# Patient Record
Sex: MALE | Race: WHITE | NOT HISPANIC OR LATINO | Employment: FULL TIME | URBAN - METROPOLITAN AREA
[De-identification: names, ages, dates, MRNs, and addresses within clinical notes are randomized per-mention and may not be internally consistent; named-entity substitution may affect disease eponyms.]

---

## 2017-03-20 ENCOUNTER — PATIENT MESSAGE (OUTPATIENT)
Dept: MEDICAL GROUP | Facility: MEDICAL CENTER | Age: 42
End: 2017-03-20

## 2017-03-20 DIAGNOSIS — Z87.19 HISTORY OF HEMORRHOIDS: ICD-10-CM

## 2017-03-20 DIAGNOSIS — K92.1 BLOOD IN STOOL: ICD-10-CM

## 2017-03-21 NOTE — TELEPHONE ENCOUNTER
From: Fredis Perry  To: EVELYN Ruvalcaba  Sent: 3/20/2017 10:30 PM PDT  Subject: Non-Urgent Medical Question    Hello,   I was wondering if I could get a referral for a gastrointestinal doctor. I'm having (more than usual) bleeding during bowel movements. Years ago one of my doctors told me that I just had hemorrhoids, but I think that I would like to get it looked at.   I wasn't sure if I could just ask on here, or if I needed to make an appointment with you first in order to get a referral.     Thanks

## 2017-09-28 RX ORDER — NADOLOL 20 MG/1
20 TABLET ORAL DAILY
Qty: 30 TAB | Refills: 0 | Status: SHIPPED | OUTPATIENT
Start: 2017-09-28 | End: 2017-10-25 | Stop reason: SDUPTHER

## 2017-09-28 NOTE — TELEPHONE ENCOUNTER
Was the patient seen in the last year in this department? Yes     Does patient have an active prescription for medications requested? No     Received Request Via: Patient through [email or web address]

## 2017-10-26 RX ORDER — NADOLOL 20 MG/1
TABLET ORAL
Qty: 30 TAB | Refills: 1 | Status: SHIPPED | OUTPATIENT
Start: 2017-10-26 | End: 2017-11-08 | Stop reason: SDUPTHER

## 2017-10-26 NOTE — TELEPHONE ENCOUNTER
Was the patient seen in the last year in this department? Yes     Does patient have an active prescription for medications requested? Yes     Received Request Via: Pharmacy     Last office visit 11/7/16

## 2017-11-08 ENCOUNTER — OFFICE VISIT (OUTPATIENT)
Dept: MEDICAL GROUP | Facility: MEDICAL CENTER | Age: 42
End: 2017-11-08
Payer: COMMERCIAL

## 2017-11-08 VITALS
WEIGHT: 218 LBS | SYSTOLIC BLOOD PRESSURE: 110 MMHG | BODY MASS INDEX: 31.21 KG/M2 | OXYGEN SATURATION: 98 % | DIASTOLIC BLOOD PRESSURE: 72 MMHG | HEIGHT: 70 IN | TEMPERATURE: 97.6 F | HEART RATE: 78 BPM

## 2017-11-08 DIAGNOSIS — F32.A ANXIETY AND DEPRESSION: ICD-10-CM

## 2017-11-08 DIAGNOSIS — E66.9 OBESITY (BMI 30-39.9): ICD-10-CM

## 2017-11-08 DIAGNOSIS — R73.01 ELEVATED FASTING GLUCOSE: ICD-10-CM

## 2017-11-08 DIAGNOSIS — Z00.00 ANNUAL PHYSICAL EXAM: ICD-10-CM

## 2017-11-08 DIAGNOSIS — E78.1 HIGH TRIGLYCERIDES: ICD-10-CM

## 2017-11-08 DIAGNOSIS — Z13.29 THYROID DISORDER SCREEN: ICD-10-CM

## 2017-11-08 DIAGNOSIS — K21.9 GASTROESOPHAGEAL REFLUX DISEASE WITHOUT ESOPHAGITIS: ICD-10-CM

## 2017-11-08 DIAGNOSIS — R74.8 ELEVATED LIVER ENZYMES: ICD-10-CM

## 2017-11-08 DIAGNOSIS — F41.9 ANXIETY AND DEPRESSION: ICD-10-CM

## 2017-11-08 DIAGNOSIS — Z12.5 SCREENING PSA (PROSTATE SPECIFIC ANTIGEN): ICD-10-CM

## 2017-11-08 PROCEDURE — 99396 PREV VISIT EST AGE 40-64: CPT | Performed by: NURSE PRACTITIONER

## 2017-11-08 RX ORDER — NADOLOL 20 MG/1
20 TABLET ORAL DAILY
Qty: 30 TAB | Refills: 1 | Status: SHIPPED | OUTPATIENT
Start: 2017-11-08 | End: 2018-02-27 | Stop reason: SDUPTHER

## 2017-11-08 ASSESSMENT — PATIENT HEALTH QUESTIONNAIRE - PHQ9: CLINICAL INTERPRETATION OF PHQ2 SCORE: 0

## 2017-11-08 NOTE — ASSESSMENT & PLAN NOTE
Intermittent issue over the last several years which he states has been further evaluated with ultrasound at one point. He states that this had been normal at the time, unsure if there was some indication of fatty liver  He does not drink any alcohol, rarely uses over-the-counter medications like Tylenol

## 2017-11-08 NOTE — ASSESSMENT & PLAN NOTE
Last lipid panel with trig > 400  States he is generally eating healthy, high vegetable diet. Does not drink any alcohol, has an occasional soda but not many sweets otherwise

## 2017-11-08 NOTE — PROGRESS NOTES
Chief Complaint   Patient presents with   • Medication Refill     Fredis Perry is a 42 y.o. male Established patient here for annual exam, he has no acute concerns. He is , has 3 children ages 12-21 who are still living with him. He is working for a home [employer] company. We discussed:    GERD (gastroesophageal reflux disease)  Well controlled with omeprazole    Anxiety and depression  Stable with lexapro 20 mg daily and nadolol 20 mg daily    High triglycerides  Last lipid panel with trig > 400  States he is generally eating healthy, high vegetable diet. Does not drink any alcohol, has an occasional soda but not many sweets otherwise    Elevated liver enzymes  Intermittent issue over the last several years which he states has been further evaluated with ultrasound at one point. He states that this had been normal at the time, unsure if there was some indication of fatty liver  He does not drink any alcohol, rarely uses over-the-counter medications like Tylenol    Elevated fasting glucose  Last fasting glucose 103  Has gained some slight weight since that time no polyuria, polydipsia      Obesity (BMI 30-39.9)  Working out about every other day- elliptical, treadmill, walking dogs    Current medicines (including changes today)  Current Outpatient Prescriptions   Medication Sig Dispense Refill   • nadolol (CORGARD) 20 MG Tab Take 1 Tab by mouth every day. TAKE ONE TABLET BY MOUTH DAILY 30 Tab 1   • omeprazole (PRILOSEC) 20 MG delayed-release capsule Take 1 Cap by mouth every day. 30 Cap 0   • escitalopram (LEXAPRO) 10 MG TABS Take 20 mg by mouth every day.       No current facility-administered medications for this visit.      He  has a past medical history of Anxiety; Depression; GERD (gastroesophageal reflux disease); and Hyperlipidemia.  He  has a past surgical history that includes tonsillectomy.  Social History   Substance Use Topics   • Smoking status: Never Smoker   • Smokeless  "tobacco: Never Used   • Alcohol use No     Social History     Social History Narrative   • No narrative on file     Family History   Problem Relation Age of Onset   • Psychiatry Mother    • Heart Disease Father    • Hypertension Father    • Hyperlipidemia Father    • Alcohol/Drug Father    • Diabetes Maternal Grandmother    • Heart Disease Maternal Grandfather    • Stroke Maternal Grandfather      Family Status   Relation Status   • Mother Alive   • Father Alive   • Sister Alive   • Brother Alive   • Maternal Grandmother    • Maternal Grandfather Alive   • Daughter Alive   • Daughter Alive   • Son Alive         Review of Systems   Constitutional: Negative for fever, chills and malaise/fatigue.   HENT: Negative for congestion, ear pain and sore throat.   Cardiovascular: Negative for chest pain, swelling in lower extremities  Pulmonary: Negative for chronic cough or shortness of breath   Gastrointestinal: Negative for nausea, vomiting and diarrhea.   Genitourinary: Negative for dysuria, urgency, frequency, hematuria and flank pain.   Neurological: Negative for dizziness and headaches.   Skin: Negative for rash, bruising     Objective:     Blood pressure 110/72, pulse 78, temperature 36.4 °C (97.6 °F), height 1.778 m (5' 10\"), weight 98.9 kg (218 lb), SpO2 98 %. Body mass index is 31.28 kg/m².  Physical Exam:  General: Alert, oriented in no acute distress.  Eye contact is good, speech is normal, affect calm  HEENT: EOMI, perrl, Oral mucosa pink moist, no lesions. Nares patent. TMs gray with good landmarks bilaterally. No cervical or supraclavicular lymphadenopathy  Lungs: clear to auscultation bilaterally, good aeration, normal effort. No wheeze/ rhonchi/ rales.  CV: regular rate and rhythm, S1, S2. No murmur, no JVD, no edema. Pedal pulses 2 + bilaterally  Abdomen: soft, nontender, BS x4, no hepatosplenomegaly.  Ext: color normal, vascularity normal, temperature normal. No rash or lesions.  MS:  No joint " swelling or redness. Strength is 5/5 globally  Neuro: DTR 2+ bilaterally  Assessment and Plan:   The following treatment plan was discussed   1. Annual physical exam  Normal exam. Impression topics reviewed including healthy diet, regular exercise, weight loss recommended. 2000 international units vitamin D3 advise daily    2. Gastroesophageal reflux disease without esophagitis  Stable with omeprazole    3. Anxiety and depression    Doing well on current medication nadolol (CORGARD) 20 MG Tab   4. High triglycerides  Triglycerides over 400 last year. Reevaluate, discussed that he may need treatment  LIPID PROFILE   5. Elevated fasting glucose  Mild glucose elevation last 2 years  HEMOGLOBIN A1C    COMP METABOLIC PANEL   6. Elevated liver enzymes  Intermittent issue over the last few years which apparently has been evaluated with ultrasound in the past, his unsure of the exact finding. States it also had hepatitis testing in the past. He does not drink any alcohol and rarely uses Tylenol. Reevaluate    7. Thyroid disorder screen  TSH WITH REFLEX TO FT4   8. Screening PSA (prostate specific antigen)  PROSTATE SPECIFIC AG SCREENING   9. Obesity (BMI 30-39.9)  Patient identified as having weight management issue.  Appropriate orders and counseling given.       Educated in proper administration of medication(s) ordered today including safety, possible SE, risks, benefits, rationale and alternatives to therapy.       Followup: pending labs             Please note that this dictation was created using voice recognition software. I have worked with consultants from the vendor as well as technical experts from [employer] to optimize the interface. I have made every reasonable attempt to correct obvious errors, but I expect that there are errors of grammar and possibly content that I did not discover before finalizing the note.

## 2017-11-29 ENCOUNTER — HOSPITAL ENCOUNTER (OUTPATIENT)
Dept: LAB | Facility: MEDICAL CENTER | Age: 42
End: 2017-11-29
Attending: NURSE PRACTITIONER
Payer: COMMERCIAL

## 2017-11-29 DIAGNOSIS — E78.1 HIGH TRIGLYCERIDES: ICD-10-CM

## 2017-11-29 DIAGNOSIS — Z13.29 THYROID DISORDER SCREEN: ICD-10-CM

## 2017-11-29 DIAGNOSIS — Z12.5 SCREENING PSA (PROSTATE SPECIFIC ANTIGEN): ICD-10-CM

## 2017-11-29 DIAGNOSIS — R73.01 ELEVATED FASTING GLUCOSE: ICD-10-CM

## 2017-11-29 LAB
ALBUMIN SERPL BCP-MCNC: 4.2 G/DL (ref 3.2–4.9)
ALBUMIN/GLOB SERPL: 1.5 G/DL
ALP SERPL-CCNC: 80 U/L (ref 30–99)
ALT SERPL-CCNC: 56 U/L (ref 2–50)
ANION GAP SERPL CALC-SCNC: 7 MMOL/L (ref 0–11.9)
AST SERPL-CCNC: 33 U/L (ref 12–45)
BILIRUB SERPL-MCNC: 0.6 MG/DL (ref 0.1–1.5)
BUN SERPL-MCNC: 12 MG/DL (ref 8–22)
CALCIUM SERPL-MCNC: 9.7 MG/DL (ref 8.5–10.5)
CHLORIDE SERPL-SCNC: 105 MMOL/L (ref 96–112)
CHOLEST SERPL-MCNC: 218 MG/DL (ref 100–199)
CO2 SERPL-SCNC: 29 MMOL/L (ref 20–33)
CREAT SERPL-MCNC: 1.08 MG/DL (ref 0.5–1.4)
GFR SERPL CREATININE-BSD FRML MDRD: >60 ML/MIN/1.73 M 2
GLOBULIN SER CALC-MCNC: 2.8 G/DL (ref 1.9–3.5)
GLUCOSE SERPL-MCNC: 105 MG/DL (ref 65–99)
HDLC SERPL-MCNC: 33 MG/DL
LDLC SERPL CALC-MCNC: ABNORMAL MG/DL
POTASSIUM SERPL-SCNC: 3.9 MMOL/L (ref 3.6–5.5)
PROT SERPL-MCNC: 7 G/DL (ref 6–8.2)
PSA SERPL-MCNC: 1.12 NG/ML (ref 0–4)
SODIUM SERPL-SCNC: 141 MMOL/L (ref 135–145)
TRIGL SERPL-MCNC: 408 MG/DL (ref 0–149)
TSH SERPL DL<=0.005 MIU/L-ACNC: 2.42 UIU/ML (ref 0.3–3.7)

## 2017-11-29 PROCEDURE — 84153 ASSAY OF PSA TOTAL: CPT

## 2017-11-29 PROCEDURE — 83036 HEMOGLOBIN GLYCOSYLATED A1C: CPT

## 2017-11-29 PROCEDURE — 80061 LIPID PANEL: CPT

## 2017-11-29 PROCEDURE — 80053 COMPREHEN METABOLIC PANEL: CPT

## 2017-11-29 PROCEDURE — 84443 ASSAY THYROID STIM HORMONE: CPT

## 2017-11-29 PROCEDURE — 36415 COLL VENOUS BLD VENIPUNCTURE: CPT

## 2017-11-30 LAB
EST. AVERAGE GLUCOSE BLD GHB EST-MCNC: 131 MG/DL
HBA1C MFR BLD: 6.2 % (ref 0–5.6)

## 2017-12-01 DIAGNOSIS — E78.1 HIGH TRIGLYCERIDES: ICD-10-CM

## 2017-12-01 DIAGNOSIS — R73.09 ELEVATED HEMOGLOBIN A1C: ICD-10-CM

## 2017-12-01 RX ORDER — FENOFIBRATE 145 MG/1
145 TABLET, COATED ORAL DAILY
Qty: 30 TAB | Refills: 5 | Status: SHIPPED | OUTPATIENT
Start: 2017-12-01 | End: 2018-05-27 | Stop reason: SDUPTHER

## 2018-02-27 DIAGNOSIS — F32.A ANXIETY AND DEPRESSION: ICD-10-CM

## 2018-02-27 DIAGNOSIS — F41.9 ANXIETY AND DEPRESSION: ICD-10-CM

## 2018-02-27 RX ORDER — NADOLOL 20 MG/1
20 TABLET ORAL DAILY
Qty: 30 TAB | Refills: 5 | Status: SHIPPED | OUTPATIENT
Start: 2018-02-27 | End: 2018-08-29 | Stop reason: SDUPTHER

## 2018-05-27 DIAGNOSIS — E78.1 HIGH TRIGLYCERIDES: ICD-10-CM

## 2018-05-29 RX ORDER — FENOFIBRATE 145 MG/1
TABLET, COATED ORAL
Qty: 30 TAB | Refills: 0 | Status: SHIPPED | OUTPATIENT
Start: 2018-05-29 | End: 2018-06-27 | Stop reason: SDUPTHER

## 2018-06-11 ENCOUNTER — HOSPITAL ENCOUNTER (OUTPATIENT)
Dept: LAB | Facility: MEDICAL CENTER | Age: 43
End: 2018-06-11
Attending: NURSE PRACTITIONER
Payer: COMMERCIAL

## 2018-06-11 DIAGNOSIS — E78.1 HIGH TRIGLYCERIDES: ICD-10-CM

## 2018-06-11 DIAGNOSIS — R73.09 ELEVATED HEMOGLOBIN A1C: ICD-10-CM

## 2018-06-11 LAB
ALBUMIN SERPL BCP-MCNC: 4.4 G/DL (ref 3.2–4.9)
ALBUMIN/GLOB SERPL: 1.8 G/DL
ALP SERPL-CCNC: 51 U/L (ref 30–99)
ALT SERPL-CCNC: 37 U/L (ref 2–50)
ANION GAP SERPL CALC-SCNC: 9 MMOL/L (ref 0–11.9)
AST SERPL-CCNC: 25 U/L (ref 12–45)
BILIRUB SERPL-MCNC: 0.4 MG/DL (ref 0.1–1.5)
BUN SERPL-MCNC: 21 MG/DL (ref 8–22)
CALCIUM SERPL-MCNC: 9.4 MG/DL (ref 8.5–10.5)
CHLORIDE SERPL-SCNC: 106 MMOL/L (ref 96–112)
CHOLEST SERPL-MCNC: 184 MG/DL (ref 100–199)
CO2 SERPL-SCNC: 25 MMOL/L (ref 20–33)
CREAT SERPL-MCNC: 1.25 MG/DL (ref 0.5–1.4)
EST. AVERAGE GLUCOSE BLD GHB EST-MCNC: 123 MG/DL
GLOBULIN SER CALC-MCNC: 2.4 G/DL (ref 1.9–3.5)
GLUCOSE SERPL-MCNC: 112 MG/DL (ref 65–99)
HBA1C MFR BLD: 5.9 % (ref 0–5.6)
HDLC SERPL-MCNC: 31 MG/DL
LDLC SERPL CALC-MCNC: 120 MG/DL
POTASSIUM SERPL-SCNC: 4.2 MMOL/L (ref 3.6–5.5)
PROT SERPL-MCNC: 6.8 G/DL (ref 6–8.2)
SODIUM SERPL-SCNC: 140 MMOL/L (ref 135–145)
TRIGL SERPL-MCNC: 167 MG/DL (ref 0–149)

## 2018-06-11 PROCEDURE — 80053 COMPREHEN METABOLIC PANEL: CPT

## 2018-06-11 PROCEDURE — 83036 HEMOGLOBIN GLYCOSYLATED A1C: CPT

## 2018-06-11 PROCEDURE — 36415 COLL VENOUS BLD VENIPUNCTURE: CPT

## 2018-06-11 PROCEDURE — 80061 LIPID PANEL: CPT

## 2018-06-19 ENCOUNTER — TELEPHONE (OUTPATIENT)
Dept: MEDICAL GROUP | Facility: MEDICAL CENTER | Age: 43
End: 2018-06-19

## 2018-06-19 NOTE — TELEPHONE ENCOUNTER
1. Caller Name: Fredis                                         Call Back Number: Mychart      Patient approves a detailed voicemail message: N\A    2. SPECIFIC Action To Be Taken: Orders pending, please sign.    3. Diagnosis/Clinical Reason for Request: Vasectomy (patient stated that he had a vasectomy and would like to make sure it is still functioning as it should.     4. Specialty & Provider Name/Lab/Imaging Location: Urology     5. Is appointment scheduled for requested order/referral: no    Patient was informed they will receive a return phone call from the office ONLY if there are any questions before processing their request. Advised to call back if they haven't received a call from the referral department in 5 days.

## 2018-07-16 ENCOUNTER — OFFICE VISIT (OUTPATIENT)
Dept: MEDICAL GROUP | Facility: MEDICAL CENTER | Age: 43
End: 2018-07-16
Payer: COMMERCIAL

## 2018-07-16 VITALS
RESPIRATION RATE: 16 BRPM | HEIGHT: 70 IN | WEIGHT: 220 LBS | TEMPERATURE: 97.8 F | SYSTOLIC BLOOD PRESSURE: 102 MMHG | DIASTOLIC BLOOD PRESSURE: 80 MMHG | OXYGEN SATURATION: 100 % | HEART RATE: 65 BPM | BODY MASS INDEX: 31.5 KG/M2

## 2018-07-16 DIAGNOSIS — Z98.52 HISTORY OF VASECTOMY: ICD-10-CM

## 2018-07-16 PROCEDURE — 99213 OFFICE O/P EST LOW 20 MIN: CPT | Performed by: NURSE PRACTITIONER

## 2018-07-16 NOTE — PROGRESS NOTES
"Subjective:     Chief Complaint   Patient presents with   • Referral Needed     UROLOGIST     Fredis Claudio Orlando is a 42 y.o. male here today to discuss concerns with vasectomy. Reports having [place]. Wife is now pregnant. He does not recall having gone to his follow-up appointment after the procedure for having a sperm analysis of time. Denies any concerns of infidelity. He would like to have sperm analysis now and possibly repeat vasectomy if needed    Current medicines (including changes today)  Current Outpatient Prescriptions   Medication Sig Dispense Refill   • fenofibrate (TRICOR) 145 MG Tab TAKE ONE TABLET BY MOUTH DAILY 30 Tab 5   • nadolol (CORGARD) 20 MG Tab Take 1 Tab by mouth every day. 30 Tab 5   • omeprazole (PRILOSEC) 20 MG delayed-release capsule Take 1 Cap by mouth every day. 30 Cap 0   • escitalopram (LEXAPRO) 10 MG TABS Take 20 mg by mouth every day.       No current facility-administered medications for this visit.      He  has a past medical history of Anxiety; Depression; GERD (gastroesophageal reflux disease); and Hyperlipidemia.    ROS included above     Objective:     Blood pressure 102/80, pulse 65, temperature 36.6 °C (97.8 °F), resp. rate 16, height 1.778 m (5' 10\"), weight 99.8 kg (220 lb), SpO2 100 %. Body mass index is 31.57 kg/m².     Physical Exam:  General: Alert, oriented in no acute distress.  Eye contact is good, speech is normal, affect calm  Lungs: clear to auscultation bilaterally, normal effort, no wheeze/ rhonchi/ rales.  CV: regular rate and rhythm, S1, S2, no murmur  Abdomen: soft, nontender  Ext: no edema, color normal, vascularity normal, temperature normal    Assessment and Plan:   The following treatment plan was discussed   1. History of vasectomy  Vasectomy 12 years ago, wife now pregnant. Sperm analysis ordered, will refer to urology for repeat vasectomy if indicated  SPERM COUNT, TOTAL       Followup: pending labs     "     Please note that this dictation was created using voice recognition software. I have worked with consultants from the vendor as well as technical experts from [facility] to optimize the interface. I have made every reasonable attempt to correct obvious errors, but I expect that there are errors of grammar and possibly content that I did not discover before finalizing the note.

## 2018-07-24 RX ORDER — ESCITALOPRAM OXALATE 10 MG/1
20 TABLET ORAL DAILY
Qty: 180 TAB | Refills: 3 | Status: SHIPPED | OUTPATIENT
Start: 2018-07-24 | End: 2019-07-21 | Stop reason: SDUPTHER

## 2018-12-22 DIAGNOSIS — E78.1 HIGH TRIGLYCERIDES: ICD-10-CM

## 2018-12-25 RX ORDER — FENOFIBRATE 145 MG/1
TABLET, COATED ORAL
Qty: 90 TAB | Refills: 1 | Status: SHIPPED | OUTPATIENT
Start: 2018-12-25 | End: 2019-06-23 | Stop reason: SDUPTHER

## 2019-01-09 ENCOUNTER — TELEMEDICINE2 (OUTPATIENT)
Dept: URGENT CARE | Facility: PHYSICIAN GROUP | Age: 44
End: 2019-01-09
Payer: COMMERCIAL

## 2019-01-10 ENCOUNTER — OFFICE VISIT (OUTPATIENT)
Dept: URGENT CARE | Facility: MEDICAL CENTER | Age: 44
End: 2019-01-10
Payer: COMMERCIAL

## 2019-01-10 VITALS
SYSTOLIC BLOOD PRESSURE: 110 MMHG | BODY MASS INDEX: 32.5 KG/M2 | TEMPERATURE: 98.9 F | WEIGHT: 227 LBS | OXYGEN SATURATION: 99 % | HEART RATE: 61 BPM | HEIGHT: 70 IN | DIASTOLIC BLOOD PRESSURE: 80 MMHG

## 2019-01-10 DIAGNOSIS — K04.7 DENTAL INFECTION: ICD-10-CM

## 2019-01-10 PROCEDURE — 99203 OFFICE O/P NEW LOW 30 MIN: CPT | Performed by: NURSE PRACTITIONER

## 2019-01-10 RX ORDER — AMOXICILLIN 500 MG/1
500 CAPSULE ORAL 3 TIMES DAILY
Qty: 30 CAP | Refills: 0 | Status: SHIPPED | OUTPATIENT
Start: 2019-01-10 | End: 2019-01-20

## 2019-01-10 RX ORDER — CHLORHEXIDINE GLUCONATE ORAL RINSE 1.2 MG/ML
15 SOLUTION DENTAL 2 TIMES DAILY
Qty: 1 BOTTLE | Refills: 0 | Status: SHIPPED | OUTPATIENT
Start: 2019-01-10 | End: 2019-09-05

## 2019-01-10 ASSESSMENT — ENCOUNTER SYMPTOMS
NAUSEA: 0
BACK PAIN: 0
PALPITATIONS: 0
NECK PAIN: 0
DIZZINESS: 0
TINGLING: 0
CHILLS: 0
FEVER: 0
COUGH: 0

## 2019-01-10 ASSESSMENT — LIFESTYLE VARIABLES: SUBSTANCE_ABUSE: 0

## 2019-01-11 NOTE — PROGRESS NOTES
"Subjective:      Fredis Perry is a 43 y.o. male who presents with Dental Injury (tooth/mouth poss infection X2 days)    Denies past medical, surgical or family history that is significant to today's problem.   RX or OTC medications-- reviewed with patient today.   No Known Allergies          HPI this is a new problem.  Fredis is a 43-year-old male patient who complains of dental infection.  He has an appointment with his dentist to follow-up next week but was asked that he go to his doctor and get some antibiotics prior to his appointment.  He has facial swelling and tenderness that extends up to the bridge of his nose and under his eye.  He has been trying dental flossing, mouth washing and brushing twice a day.  He has neglected his dental care due to lack of insurance.  He has multiple fractured teeth but is just not have the resources to fix them.  Tylenol has been helping his pain and discomfort.  Pain 4 out of 10, constant.  Pain woke him up a couple times last night but he was quickly able to return to sleep.  No other aggravating or alleviating factors.    Review of Systems   Constitutional: Negative for chills, fever and malaise/fatigue.   Respiratory: Negative for cough.    Cardiovascular: Negative for chest pain and palpitations.   Gastrointestinal: Negative for nausea.   Musculoskeletal: Negative for back pain and neck pain.   Neurological: Negative for dizziness and tingling.   Psychiatric/Behavioral: Negative for substance abuse.          Objective:     /80   Pulse 61   Temp 37.2 °C (98.9 °F) (Temporal)   Ht 1.778 m (5' 10\")   Wt 103 kg (227 lb)   SpO2 99%   BMI 32.57 kg/m²      Physical Exam   Constitutional: He is oriented to person, place, and time. Vital signs are normal. He appears well-developed and well-nourished. He is cooperative.  Non-toxic appearance. He does not appear ill. He appears distressed.   HENT:   Head: Normocephalic.       Right Ear: Hearing, tympanic membrane, " external ear and ear canal normal.   Left Ear: Hearing, tympanic membrane, external ear and ear canal normal.   Nose: Right sinus exhibits no maxillary sinus tenderness and no frontal sinus tenderness. Left sinus exhibits no maxillary sinus tenderness and no frontal sinus tenderness.   Mouth/Throat: Uvula is midline, oropharynx is clear and moist and mucous membranes are normal. Abnormal dentition. Dental caries present. No dental abscesses or uvula swelling. No oropharyngeal exudate or tonsillar abscesses.       Eyes: Pupils are equal, round, and reactive to light. Conjunctivae, EOM and lids are normal.   Neck: Trachea normal, normal range of motion, full passive range of motion without pain and phonation normal. Neck supple.   Cardiovascular: Normal rate, regular rhythm, normal heart sounds and normal pulses.    No murmur heard.  Pulmonary/Chest: Effort normal and breath sounds normal. No respiratory distress. He has no decreased breath sounds. He has no wheezes. He has no rhonchi. He has no rales.   Abdominal: Soft. Normal appearance and bowel sounds are normal.   Musculoskeletal: Normal range of motion.   Lymphadenopathy:        Head (right side): No tonsillar, no preauricular and no posterior auricular adenopathy present.        Head (left side): No tonsillar, no preauricular and no posterior auricular adenopathy present.     He has no cervical adenopathy.   Neurological: He is alert and oriented to person, place, and time.   Skin: Skin is warm, dry and intact.   Psychiatric: He has a normal mood and affect. His speech is normal and behavior is normal. Judgment and thought content normal.   Nursing note and vitals reviewed.              Assessment/Plan:     1. Dental infection  amoxicillin (AMOXIL) 500 MG Cap    chlorhexidine (PERIDEX) 0.12 % Solution     FU with dentist as scheduled next week.   Educated in proper administration of medication(s) ordered today including safety, possible SE, risks, benefits,  rationale and alternatives to therapy.   Return to clinic or PCP  5-7 days if current symptoms are not resolving in a satisfactory manner or sooner if new or worsening symptoms occur.   Patient was advised of signs and symptoms which would warrant further evaluation and /or emergent evaluation in ER.  Verbalized agreement with this treatment plan and seemed to understand without barriers. Questions were encouraged and answered to patients satisfaction.   Aftercare instructions were given to pt/ caregiver.

## 2019-02-23 ENCOUNTER — OFFICE VISIT (OUTPATIENT)
Dept: URGENT CARE | Facility: PHYSICIAN GROUP | Age: 44
End: 2019-02-23
Payer: COMMERCIAL

## 2019-02-23 VITALS
WEIGHT: 227 LBS | DIASTOLIC BLOOD PRESSURE: 72 MMHG | BODY MASS INDEX: 32.5 KG/M2 | OXYGEN SATURATION: 100 % | HEIGHT: 70 IN | HEART RATE: 65 BPM | SYSTOLIC BLOOD PRESSURE: 122 MMHG | TEMPERATURE: 99.3 F

## 2019-02-23 DIAGNOSIS — K04.7 DENTAL ABSCESS: ICD-10-CM

## 2019-02-23 PROCEDURE — 99214 OFFICE O/P EST MOD 30 MIN: CPT | Performed by: PHYSICIAN ASSISTANT

## 2019-02-23 RX ORDER — AMOXICILLIN AND CLAVULANATE POTASSIUM 875; 125 MG/1; MG/1
1 TABLET, FILM COATED ORAL 2 TIMES DAILY
Qty: 20 TAB | Refills: 0 | Status: SHIPPED | OUTPATIENT
Start: 2019-02-23 | End: 2019-09-05

## 2019-02-23 ASSESSMENT — ENCOUNTER SYMPTOMS: HEADACHES: 0

## 2019-02-23 NOTE — PROGRESS NOTES
Subjective:      Fredis Perry is a 43 y.o. male who presents with Tooth Abscess (onset 1 month// on and off)    PMH:  has a past medical history of Anxiety; Depression; GERD (gastroesophageal reflux disease); and Hyperlipidemia.  MEDS:   Current Outpatient Prescriptions:   •  amoxicillin-clavulanate (AUGMENTIN) 875-125 MG Tab, Take 1 Tab by mouth 2 times a day., Disp: 20 Tab, Rfl: 0  •  vitamin D (CHOLECALCIFEROL) 1000 UNIT Tab, Take 1,000 Units by mouth every day., Disp: , Rfl:   •  fenofibrate (TRICOR) 145 MG Tab, TAKE ONE TABLET BY MOUTH DAILY, Disp: 90 Tab, Rfl: 1  •  nadolol (CORGARD) 20 MG Tab, TAKE ONE TABLET BY MOUTH DAILY, Disp: 3090 Tab, Rfl: 1  •  escitalopram (LEXAPRO) 10 MG Tab, Take 2 Tabs by mouth every day., Disp: 180 Tab, Rfl: 3  •  omeprazole (PRILOSEC) 20 MG delayed-release capsule, Take 1 Cap by mouth every day., Disp: 30 Cap, Rfl: 0  •  chlorhexidine (PERIDEX) 0.12 % Solution, Take 15 mL by mouth 2 times a day. (Patient not taking: Reported on 2/23/2019), Disp: 1 Bottle, Rfl: 0  ALLERGIES: No Known Allergies  SURGHX:   Past Surgical History:   Procedure Laterality Date   • TONSILLECTOMY       SOCHX:  reports that he has never smoked. He has never used smokeless tobacco. He reports that he does not drink alcohol or use drugs.  FH: Reviewed with patient, not pertinent to this visit.           Patient presents with:  Tooth Abscess: onset 1 month// on and off.   Patient has an appointment in 4 days to have a root canal and crown placed on tooth #7 which has been decayed and abscessed on and off.  Patient states he woke up today with some slight swelling of his gum above the tooth as well as his upper lip with tenderness under his nose.  Patient called the dentist as well as the endodontist who instructed him to come to urgent care for some antibiotics.  Patient has been taking some over-the-counter ibuprofen with relief of pain but understands he needs antibiotics for the infection to go  "away.  Patient denies any other complaint today.      Dental Problems    This is a new problem. The current episode started today. The problem occurs constantly. The problem has been gradually worsening. The pain is at a severity of 6/10. Associated symptoms include facial pain and thermal sensitivity. Pertinent negatives include no difficulty swallowing or oral bleeding. He has tried NSAIDs for the symptoms. The treatment provided moderate relief.       Review of Systems   HENT: Positive for dental problem.    Neurological: Negative for headaches.   All other systems reviewed and are negative.         Objective:     /72   Pulse 65   Temp 37.4 °C (99.3 °F) (Temporal)   Ht 1.778 m (5' 10\")   Wt 103 kg (227 lb)   SpO2 100%   BMI 32.57 kg/m²      Physical Exam   Constitutional: He is oriented to person, place, and time. He appears well-developed and well-nourished. No distress.   HENT:   Head: Normocephalic.   Right Ear: Tympanic membrane normal.   Left Ear: Tympanic membrane normal.   Nose: Nose normal.   Mouth/Throat: Uvula is midline, oropharynx is clear and moist and mucous membranes are normal. Dental abscesses present. No uvula swelling.       Eyes: Pupils are equal, round, and reactive to light. EOM are normal.   Neck: Normal range of motion. Neck supple.   Cardiovascular: Normal rate and regular rhythm.    Pulmonary/Chest: Effort normal and breath sounds normal.   Musculoskeletal: Normal range of motion.   Neurological: He is alert and oriented to person, place, and time.   Skin: Skin is warm and dry.   Psychiatric: He has a normal mood and affect.   Nursing note and vitals reviewed.              Assessment/Plan:     1. Dental abscess  amoxicillin-clavulanate (AUGMENTIN) 875-125 MG Tab     Motrin/Advil/Ibuprophen 600 mg every 6 hours as needed for pain or fever.  PT advised saltwater gargles/swishes  3-4 times daily until symptoms improve.     PT should follow up with PCP in 1-2 days for " re-evaluation if symptoms have not improved.  Discussed red flags and reasons to return to UC or ED.  Pt and/or family verbalized understanding of diagnosis and follow up instructions and was offered informational handout on diagnosis.  PT discharged.

## 2019-05-10 ENCOUNTER — OFFICE VISIT (OUTPATIENT)
Dept: URGENT CARE | Facility: PHYSICIAN GROUP | Age: 44
End: 2019-05-10
Payer: COMMERCIAL

## 2019-05-10 VITALS
OXYGEN SATURATION: 99 % | BODY MASS INDEX: 30.78 KG/M2 | TEMPERATURE: 97.9 F | HEIGHT: 70 IN | DIASTOLIC BLOOD PRESSURE: 82 MMHG | WEIGHT: 215 LBS | SYSTOLIC BLOOD PRESSURE: 120 MMHG | RESPIRATION RATE: 14 BRPM | HEART RATE: 79 BPM

## 2019-05-10 DIAGNOSIS — J01.10 ACUTE FRONTAL SINUSITIS, RECURRENCE NOT SPECIFIED: ICD-10-CM

## 2019-05-10 PROCEDURE — 99214 OFFICE O/P EST MOD 30 MIN: CPT | Performed by: FAMILY MEDICINE

## 2019-05-10 RX ORDER — AMOXICILLIN 875 MG/1
875 TABLET, COATED ORAL 2 TIMES DAILY
Qty: 14 TAB | Refills: 0 | Status: SHIPPED | OUTPATIENT
Start: 2019-05-10 | End: 2019-05-17

## 2019-05-10 RX ORDER — FLUTICASONE PROPIONATE 50 MCG
1 SPRAY, SUSPENSION (ML) NASAL 2 TIMES DAILY
Qty: 1 BOTTLE | Refills: 0 | Status: SHIPPED | OUTPATIENT
Start: 2019-05-10 | End: 2020-08-28

## 2019-05-10 NOTE — LETTER
May 10, 2019         Patient: Fredis Perry   YOB: 1975   Date of Visit: 5/10/2019           To Whom it May Concern:    Fredis Perry was seen in my clinic on 5/10/2019.      If you have any questions or concerns, please don't hesitate to call.        Sincerely,           Hung Christiansen M.D.  Electronically Signed

## 2019-05-11 NOTE — PROGRESS NOTES
"Chief Complaint   Patient presents with   • Cough           CC:  cough        Cough  This is a new problem. The current episode started 8 days ago. The problem has been unchanged. The problem occurs constantly. The cough is dry. Associated symptoms include headaches, nasal/sinus congestion. Pertinent negatives include no fever,  , nausea, vomiting, diarrhea, sweats, weight loss or wheezing. Nothing aggravates the symptoms.  Patient has tried nothing for the symptoms. There is no history of asthma.          Current Outpatient Prescriptions on File Prior to Visit   Medication Sig Dispense Refill   • nadolol (CORGARD) 20 MG Tab TAKE 1 TABLET BY MOUTH ONCE DAILY 90 Tab 1   • fenofibrate (TRICOR) 145 MG Tab TAKE ONE TABLET BY MOUTH DAILY 90 Tab 1   • escitalopram (LEXAPRO) 10 MG Tab Take 2 Tabs by mouth every day. 180 Tab 3   • omeprazole (PRILOSEC) 20 MG delayed-release capsule Take 1 Cap by mouth every day. 30 Cap 0   • amoxicillin-clavulanate (AUGMENTIN) 875-125 MG Tab Take 1 Tab by mouth 2 times a day. 20 Tab 0   • vitamin D (CHOLECALCIFEROL) 1000 UNIT Tab Take 1,000 Units by mouth every day.     • chlorhexidine (PERIDEX) 0.12 % Solution Take 15 mL by mouth 2 times a day. (Patient not taking: Reported on 2/23/2019) 1 Bottle 0     No current facility-administered medications on file prior to visit.          Social History   Substance Use Topics   • Smoking status: Never Smoker   • Smokeless tobacco: Never Used   • Alcohol use No           Review of Systems   Constitutional: Negative for fever and weight loss.   HENT: negative for ear pain.    Cardiovascular - denies chest pain or dyspnea  Respiratory: Positive for cough.  .  Negative for wheezing.    Neurological: Negative for headaches.   GI - denies nausea, vomiting or diarrhea  Neuro - denies numbness or tingling.            Objective:     /82   Pulse 79   Temp 36.6 °C (97.9 °F) (Temporal)   Resp 14   Ht 1.778 m (5' 10\")   Wt 97.5 kg (215 lb)   SpO2 99% "       Physical Exam   Constitutional: patient is oriented to person, place, and time. Patient appears well-developed and well-nourished. No distress.   HENT:   Head: Normocephalic and atraumatic.   Right Ear: External ear normal.   Left Ear: External ear normal.   Nose: Mucosal edema  present. +BILAT SINUS TENDERNESS NOTED.          Mouth/Throat: Mucous membranes are normal. No oral lesions. Posterior oropharyngeal erythema present. No oropharyngeal exudate or posterior oropharyngeal edema.   Eyes: Conjunctivae and EOM are normal. Pupils are equal, round, and reactive to light. Right eye exhibits no discharge. Left eye exhibits no discharge. No scleral icterus.   Neck: Normal range of motion. Neck supple. No tracheal deviation present.   Cardiovascular: Normal rate, regular rhythm and normal heart sounds.  Exam reveals no friction rub.    Pulmonary/Chest: Effort normal. No respiratory distress. Patient has no wheezes or rhonchi. Patient has no rales.    Musculoskeletal:  exhibits no edema.   Lymphadenopathy:     Patient has cervical adenopathy.   Neurological: patient is alert and oriented to person, place, and time.   Skin: Skin is warm and dry. No rash noted. No erythema.   Psychiatric: patient  has a normal mood and affect.  behavior is normal.   Nursing note and vitals reviewed.              Assessment/Plan:       1. Acute frontal sinusitis, recurrence not specified     - amoxicillin (AMOXIL) 875 MG tablet; Take 1 Tab by mouth 2 times a day for 7 days.  Dispense: 14 Tab; Refill: 0  - fluticasone (FLONASE) 50 MCG/ACT nasal spray; Spray 1 Spray in nose 2 times a day.  Dispense: 1 Bottle; Refill: 0    Follow up in one week if no improvement, sooner if symptoms worsen.

## 2019-05-15 RX ORDER — DOXYCYCLINE HYCLATE 100 MG
100 TABLET ORAL 2 TIMES DAILY
Qty: 14 TAB | Refills: 0 | Status: SHIPPED | OUTPATIENT
Start: 2019-05-15 | End: 2019-05-22

## 2019-06-23 DIAGNOSIS — E78.1 HIGH TRIGLYCERIDES: ICD-10-CM

## 2019-06-24 RX ORDER — FENOFIBRATE 145 MG/1
TABLET, COATED ORAL
Qty: 90 TAB | Refills: 0 | Status: SHIPPED | OUTPATIENT
Start: 2019-06-24 | End: 2019-09-26 | Stop reason: SDUPTHER

## 2019-06-25 NOTE — TELEPHONE ENCOUNTER
Left the patient a detailed voice message to call [phone] to schedule his annual appointment.

## 2019-07-22 RX ORDER — ESCITALOPRAM OXALATE 10 MG/1
TABLET ORAL
Qty: 180 TAB | Refills: 0 | Status: SHIPPED | OUTPATIENT
Start: 2019-07-22 | End: 2019-10-16 | Stop reason: SDUPTHER

## 2019-08-22 DIAGNOSIS — F41.9 ANXIETY AND DEPRESSION: ICD-10-CM

## 2019-08-22 DIAGNOSIS — F32.A ANXIETY AND DEPRESSION: ICD-10-CM

## 2019-08-22 RX ORDER — NADOLOL 20 MG/1
TABLET ORAL
Qty: 90 TAB | Refills: 0 | Status: SHIPPED | OUTPATIENT
Start: 2019-08-22 | End: 2019-11-16 | Stop reason: SDUPTHER

## 2019-09-05 ENCOUNTER — OFFICE VISIT (OUTPATIENT)
Dept: MEDICAL GROUP | Facility: MEDICAL CENTER | Age: 44
End: 2019-09-05
Payer: COMMERCIAL

## 2019-09-05 VITALS
BODY MASS INDEX: 31.5 KG/M2 | DIASTOLIC BLOOD PRESSURE: 80 MMHG | HEIGHT: 70 IN | TEMPERATURE: 97.3 F | OXYGEN SATURATION: 100 % | HEART RATE: 51 BPM | RESPIRATION RATE: 16 BRPM | WEIGHT: 220 LBS | SYSTOLIC BLOOD PRESSURE: 118 MMHG

## 2019-09-05 DIAGNOSIS — R73.01 ELEVATED FASTING GLUCOSE: ICD-10-CM

## 2019-09-05 DIAGNOSIS — F41.9 ANXIETY AND DEPRESSION: ICD-10-CM

## 2019-09-05 DIAGNOSIS — E66.9 OBESITY (BMI 30-39.9): ICD-10-CM

## 2019-09-05 DIAGNOSIS — F32.A ANXIETY AND DEPRESSION: ICD-10-CM

## 2019-09-05 DIAGNOSIS — Z00.00 ANNUAL PHYSICAL EXAM: ICD-10-CM

## 2019-09-05 DIAGNOSIS — E78.1 HIGH TRIGLYCERIDES: ICD-10-CM

## 2019-09-05 DIAGNOSIS — K21.9 GASTROESOPHAGEAL REFLUX DISEASE WITHOUT ESOPHAGITIS: ICD-10-CM

## 2019-09-05 PROCEDURE — 99396 PREV VISIT EST AGE 40-64: CPT | Performed by: NURSE PRACTITIONER

## 2019-09-05 NOTE — ASSESSMENT & PLAN NOTE
Last fasting glucose 112 and a1c 5.9.  No significant dietary change over the last year, he is doing some light exercise.  Weight generally stable.  No polyuria, polydipsia, no blurred vision

## 2019-09-05 NOTE — PROGRESS NOTES
Chief Complaint   Patient presents with   • Annual Exam     Fredis Perry is a 43 y.o. male established patient here for annual exam.  We discussed:  GERD (gastroesophageal reflux disease)  Asymptomatic with omeprazole.  He finds that symptoms return if he skips a dose    High triglycerides  Consistently taking fenofibrate  Generally following healthy diet, some exercise  Last LDL was also elevated at 120    Anxiety and depression  Stable and doing well with lexapro    Elevated fasting glucose  Last fasting glucose 112 and a1c 5.9.  No significant dietary change over the last year, he is doing some light exercise.  Weight generally stable.  No polyuria, polydipsia, no blurred vision    Current medicines (including changes today)  Current Outpatient Medications   Medication Sig Dispense Refill   • nadolol (CORGARD) 20 MG Tab TAKE ONE TABLET BY MOUTH DAILY 90 Tab 0   • escitalopram (LEXAPRO) 10 MG Tab TAKE TWO TABLETS BY MOUTH DAILY 180 Tab 0   • fenofibrate (TRICOR) 145 MG Tab TAKE ONE TABLET BY MOUTH DAILY 90 Tab 0   • vitamin D (CHOLECALCIFEROL) 1000 UNIT Tab Take 1,000 Units by mouth every day.     • omeprazole (PRILOSEC) 20 MG delayed-release capsule Take 1 Cap by mouth every day. 30 Cap 0   • fluticasone (FLONASE) 50 MCG/ACT nasal spray Spray 1 Spray in nose 2 times a day. 1 Bottle 0     No current facility-administered medications for this visit.      He  has a past medical history of Anxiety, Depression, GERD (gastroesophageal reflux disease), and Hyperlipidemia.  He  has a past surgical history that includes tonsillectomy.  Social History     Tobacco Use   • Smoking status: Never Smoker   • Smokeless tobacco: Never Used   Substance Use Topics   • Alcohol use: No     Alcohol/week: 0.0 oz   • Drug use: No     Social History     Social History Narrative   • Not on file     Family History   Problem Relation Age of Onset   • Psychiatric Illness Mother    • Heart Disease Father    • Hypertension Father    •  "Hyperlipidemia Father    • Alcohol/Drug Father    • Diabetes Maternal Grandmother    • Heart Disease Maternal Grandfather    • Stroke Maternal Grandfather      Family Status   Relation Name Status   • Mo  Alive   • Fa  Alive   • Sis  Alive   • Bro 2 Alive   • MGMo     • MGFa  Alive   • Artemio  Alive   • Artemio  Alive   • Son  Alive         ROS  Problems listed discussed above, all other systems reviewed and negative     Objective:     /80 (BP Location: Left arm, Patient Position: Sitting, BP Cuff Size: Adult)   Pulse (!) 51   Temp 36.3 °C (97.3 °F) (Temporal)   Resp 16   Ht 1.778 m (5' 10\")   Wt 99.8 kg (220 lb)   SpO2 100%  Body mass index is 31.57 kg/m².  Physical Exam:  General: Alert, oriented in no acute distress.  Eye contact is good, speech is normal, affect calm  HEENT:  Oral mucosa pink moist, no lesions. Nares patent. TMs gray with good landmarks bilaterally. No cervical or supraclavicular lymphadenopathy, thyroid isthmus palpable without masses or nodules.  Lungs: clear to auscultation bilaterally, good aeration, normal effort. No wheeze/ rhonchi/ rales.  CV: regular rate and rhythm, S1, S2. No murmur, no JVD, no edema. PMI at 5th intercostal space midclavicular line. Pedal pulses 2 + bilaterally  Abdomen: soft, nontender, BS x4, no hepatosplenomegaly.  Ext: color normal, vascularity normal, temperature normal. No rash or lesions.  Neuro: DTR 2+ bilaterally  Assessment and Plan:   The following treatment plan was discussed   1. Annual physical exam  Normal physical exam except as discussed below. General health and wellness discussion including healthy diet, regular exercise. 2000 iu Vit d3 advised daily. . Advised regular dental cleanings, eye exam yearly.  Comp Metabolic Panel   2. Gastroesophageal reflux disease without esophagitis   stable, continue omeprazole   3. High triglycerides   managed with fenofibrate.  If LDL is again elevated would consider CT calcium scoring, may need " addition of statin  Lipid Profile   4. Anxiety and depression   stable and doing well on Lexapro   5. Elevated fasting glucose  Comp Metabolic Panel    HEMOGLOBIN A1C   6. Obesity (BMI 30-39.9)  Patient identified as having weight management issue.  Appropriate orders and counseling given.         Followup: Annually and pending labs           Please note that this dictation was created using voice recognition software. I have worked with consultants from the vendor as well as technical experts from [facility] to optimize the interface. I have made every reasonable attempt to correct obvious errors, but I expect that there are errors of grammar and possibly content that I did not discover before finalizing the note.

## 2019-09-05 NOTE — ASSESSMENT & PLAN NOTE
Consistently taking fenofibrate  Generally following healthy diet, some exercise  Last LDL was also elevated at 120

## 2019-09-07 ENCOUNTER — HOSPITAL ENCOUNTER (OUTPATIENT)
Dept: LAB | Facility: MEDICAL CENTER | Age: 44
End: 2019-09-07
Attending: NURSE PRACTITIONER
Payer: COMMERCIAL

## 2019-09-07 DIAGNOSIS — R73.01 ELEVATED FASTING GLUCOSE: ICD-10-CM

## 2019-09-07 DIAGNOSIS — E78.1 HIGH TRIGLYCERIDES: ICD-10-CM

## 2019-09-07 DIAGNOSIS — Z00.00 ANNUAL PHYSICAL EXAM: ICD-10-CM

## 2019-09-07 LAB
ALBUMIN SERPL BCP-MCNC: 4.2 G/DL (ref 3.2–4.9)
ALBUMIN/GLOB SERPL: 1.8 G/DL
ALP SERPL-CCNC: 52 U/L (ref 30–99)
ALT SERPL-CCNC: 36 U/L (ref 2–50)
ANION GAP SERPL CALC-SCNC: 10 MMOL/L (ref 0–11.9)
AST SERPL-CCNC: 27 U/L (ref 12–45)
BILIRUB SERPL-MCNC: 0.4 MG/DL (ref 0.1–1.5)
BUN SERPL-MCNC: 16 MG/DL (ref 8–22)
CALCIUM SERPL-MCNC: 9 MG/DL (ref 8.5–10.5)
CHLORIDE SERPL-SCNC: 108 MMOL/L (ref 96–112)
CHOLEST SERPL-MCNC: 163 MG/DL (ref 100–199)
CO2 SERPL-SCNC: 25 MMOL/L (ref 20–33)
CREAT SERPL-MCNC: 1.2 MG/DL (ref 0.5–1.4)
EST. AVERAGE GLUCOSE BLD GHB EST-MCNC: 131 MG/DL
FASTING STATUS PATIENT QL REPORTED: NORMAL
GLOBULIN SER CALC-MCNC: 2.4 G/DL (ref 1.9–3.5)
GLUCOSE SERPL-MCNC: 116 MG/DL (ref 65–99)
HBA1C MFR BLD: 6.2 % (ref 0–5.6)
HDLC SERPL-MCNC: 36 MG/DL
LDLC SERPL CALC-MCNC: 101 MG/DL
POTASSIUM SERPL-SCNC: 4.3 MMOL/L (ref 3.6–5.5)
PROT SERPL-MCNC: 6.6 G/DL (ref 6–8.2)
SODIUM SERPL-SCNC: 143 MMOL/L (ref 135–145)
TRIGL SERPL-MCNC: 130 MG/DL (ref 0–149)

## 2019-09-07 PROCEDURE — 36415 COLL VENOUS BLD VENIPUNCTURE: CPT

## 2019-09-07 PROCEDURE — 80061 LIPID PANEL: CPT

## 2019-09-07 PROCEDURE — 80053 COMPREHEN METABOLIC PANEL: CPT

## 2019-09-07 PROCEDURE — 83036 HEMOGLOBIN GLYCOSYLATED A1C: CPT

## 2019-09-26 DIAGNOSIS — E78.1 HIGH TRIGLYCERIDES: ICD-10-CM

## 2019-09-29 RX ORDER — FENOFIBRATE 145 MG/1
TABLET, COATED ORAL
Qty: 30 TAB | Refills: 11 | Status: SHIPPED | OUTPATIENT
Start: 2019-09-29 | End: 2020-09-21

## 2019-10-17 RX ORDER — ESCITALOPRAM OXALATE 20 MG/1
TABLET ORAL
Qty: 90 TAB | Refills: 3 | Status: SHIPPED | OUTPATIENT
Start: 2019-10-17 | End: 2020-10-13 | Stop reason: SDUPTHER

## 2019-11-16 DIAGNOSIS — F41.9 ANXIETY AND DEPRESSION: ICD-10-CM

## 2019-11-16 DIAGNOSIS — F32.A ANXIETY AND DEPRESSION: ICD-10-CM

## 2019-11-18 RX ORDER — NADOLOL 20 MG/1
TABLET ORAL
Qty: 90 TAB | Refills: 3 | Status: SHIPPED | OUTPATIENT
Start: 2019-11-18 | End: 2020-11-16

## 2020-06-29 DIAGNOSIS — Z12.83 SKIN CANCER SCREENING: ICD-10-CM

## 2020-06-29 DIAGNOSIS — K42.9 UMBILICAL HERNIA WITHOUT OBSTRUCTION AND WITHOUT GANGRENE: ICD-10-CM

## 2020-08-28 ENCOUNTER — PRE-ADMISSION TESTING (OUTPATIENT)
Dept: ADMISSIONS | Facility: MEDICAL CENTER | Age: 45
End: 2020-08-28
Attending: SURGERY
Payer: COMMERCIAL

## 2020-08-28 DIAGNOSIS — Z01.810 PRE-OPERATIVE CARDIOVASCULAR EXAMINATION: ICD-10-CM

## 2020-08-28 DIAGNOSIS — Z01.812 PRE-OPERATIVE LABORATORY EXAMINATION: ICD-10-CM

## 2020-08-28 LAB
ALBUMIN SERPL BCP-MCNC: 4.7 G/DL (ref 3.2–4.9)
ALBUMIN/GLOB SERPL: 1.9 G/DL
ALP SERPL-CCNC: 76 U/L (ref 30–99)
ALT SERPL-CCNC: 47 U/L (ref 2–50)
ANION GAP SERPL CALC-SCNC: 12 MMOL/L (ref 7–16)
AST SERPL-CCNC: 30 U/L (ref 12–45)
BASOPHILS # BLD AUTO: 1.4 % (ref 0–1.8)
BASOPHILS # BLD: 0.07 K/UL (ref 0–0.12)
BILIRUB SERPL-MCNC: 0.2 MG/DL (ref 0.1–1.5)
BUN SERPL-MCNC: 10 MG/DL (ref 8–22)
CALCIUM SERPL-MCNC: 9.5 MG/DL (ref 8.5–10.5)
CHLORIDE SERPL-SCNC: 103 MMOL/L (ref 96–112)
CO2 SERPL-SCNC: 25 MMOL/L (ref 20–33)
COVID ORDER STATUS COVID19: NORMAL
CREAT SERPL-MCNC: 1.14 MG/DL (ref 0.5–1.4)
EKG IMPRESSION: NORMAL
EOSINOPHIL # BLD AUTO: 0.17 K/UL (ref 0–0.51)
EOSINOPHIL NFR BLD: 3.3 % (ref 0–6.9)
ERYTHROCYTE [DISTWIDTH] IN BLOOD BY AUTOMATED COUNT: 39.9 FL (ref 35.9–50)
GLOBULIN SER CALC-MCNC: 2.5 G/DL (ref 1.9–3.5)
GLUCOSE SERPL-MCNC: 117 MG/DL (ref 65–99)
HCT VFR BLD AUTO: 44.3 % (ref 42–52)
HGB BLD-MCNC: 14 G/DL (ref 14–18)
IMM GRANULOCYTES # BLD AUTO: 0.02 K/UL (ref 0–0.11)
IMM GRANULOCYTES NFR BLD AUTO: 0.4 % (ref 0–0.9)
INR PPP: 0.91 (ref 0.87–1.13)
LYMPHOCYTES # BLD AUTO: 1.78 K/UL (ref 1–4.8)
LYMPHOCYTES NFR BLD: 34.7 % (ref 22–41)
MCH RBC QN AUTO: 25.9 PG (ref 27–33)
MCHC RBC AUTO-ENTMCNC: 31.6 G/DL (ref 33.7–35.3)
MCV RBC AUTO: 82 FL (ref 81.4–97.8)
MONOCYTES # BLD AUTO: 0.58 K/UL (ref 0–0.85)
MONOCYTES NFR BLD AUTO: 11.3 % (ref 0–13.4)
NEUTROPHILS # BLD AUTO: 2.51 K/UL (ref 1.82–7.42)
NEUTROPHILS NFR BLD: 48.9 % (ref 44–72)
NRBC # BLD AUTO: 0 K/UL
NRBC BLD-RTO: 0 /100 WBC
PLATELET # BLD AUTO: 314 K/UL (ref 164–446)
PMV BLD AUTO: 10.9 FL (ref 9–12.9)
POTASSIUM SERPL-SCNC: 4.4 MMOL/L (ref 3.6–5.5)
PROT SERPL-MCNC: 7.2 G/DL (ref 6–8.2)
PROTHROMBIN TIME: 12.6 SEC (ref 12–14.6)
RBC # BLD AUTO: 5.4 M/UL (ref 4.7–6.1)
SARS-COV-2 RNA RESP QL NAA+PROBE: NOTDETECTED
SODIUM SERPL-SCNC: 140 MMOL/L (ref 135–145)
SPECIMEN SOURCE: NORMAL
WBC # BLD AUTO: 5.1 K/UL (ref 4.8–10.8)

## 2020-08-28 PROCEDURE — 93010 ELECTROCARDIOGRAM REPORT: CPT | Performed by: INTERNAL MEDICINE

## 2020-08-28 PROCEDURE — 36415 COLL VENOUS BLD VENIPUNCTURE: CPT

## 2020-08-28 PROCEDURE — U0003 INFECTIOUS AGENT DETECTION BY NUCLEIC ACID (DNA OR RNA); SEVERE ACUTE RESPIRATORY SYNDROME CORONAVIRUS 2 (SARS-COV-2) (CORONAVIRUS DISEASE [COVID-19]), AMPLIFIED PROBE TECHNIQUE, MAKING USE OF HIGH THROUGHPUT TECHNOLOGIES AS DESCRIBED BY CMS-2020-01-R: HCPCS

## 2020-08-28 PROCEDURE — 85025 COMPLETE CBC W/AUTO DIFF WBC: CPT

## 2020-08-28 PROCEDURE — 93005 ELECTROCARDIOGRAM TRACING: CPT

## 2020-08-28 PROCEDURE — 85610 PROTHROMBIN TIME: CPT

## 2020-08-28 PROCEDURE — 80053 COMPREHEN METABOLIC PANEL: CPT

## 2020-09-01 ENCOUNTER — HOSPITAL ENCOUNTER (OUTPATIENT)
Facility: MEDICAL CENTER | Age: 45
End: 2020-09-01
Attending: SURGERY | Admitting: SURGERY
Payer: COMMERCIAL

## 2020-09-01 ENCOUNTER — ANESTHESIA EVENT (OUTPATIENT)
Dept: SURGERY | Facility: MEDICAL CENTER | Age: 45
End: 2020-09-01
Payer: COMMERCIAL

## 2020-09-01 ENCOUNTER — ANESTHESIA (OUTPATIENT)
Dept: SURGERY | Facility: MEDICAL CENTER | Age: 45
End: 2020-09-01
Payer: COMMERCIAL

## 2020-09-01 VITALS
OXYGEN SATURATION: 95 % | WEIGHT: 220.46 LBS | RESPIRATION RATE: 16 BRPM | BODY MASS INDEX: 31.56 KG/M2 | HEIGHT: 70 IN | SYSTOLIC BLOOD PRESSURE: 115 MMHG | TEMPERATURE: 98.6 F | HEART RATE: 67 BPM | DIASTOLIC BLOOD PRESSURE: 70 MMHG

## 2020-09-01 DIAGNOSIS — K43.6 INCARCERATED VENTRAL HERNIA: ICD-10-CM

## 2020-09-01 PROCEDURE — A9270 NON-COVERED ITEM OR SERVICE: HCPCS | Performed by: ANESTHESIOLOGY

## 2020-09-01 PROCEDURE — 160009 HCHG ANES TIME/MIN: Performed by: SURGERY

## 2020-09-01 PROCEDURE — 160025 RECOVERY II MINUTES (STATS): Performed by: SURGERY

## 2020-09-01 PROCEDURE — 160048 HCHG OR STATISTICAL LEVEL 1-5: Performed by: SURGERY

## 2020-09-01 PROCEDURE — 160002 HCHG RECOVERY MINUTES (STAT): Performed by: SURGERY

## 2020-09-01 PROCEDURE — A9270 NON-COVERED ITEM OR SERVICE: HCPCS | Performed by: SURGERY

## 2020-09-01 PROCEDURE — 700101 HCHG RX REV CODE 250: Performed by: SURGERY

## 2020-09-01 PROCEDURE — 160038 HCHG SURGERY MINUTES - EA ADDL 1 MIN LEVEL 2: Performed by: SURGERY

## 2020-09-01 PROCEDURE — 700111 HCHG RX REV CODE 636 W/ 250 OVERRIDE (IP): Performed by: ANESTHESIOLOGY

## 2020-09-01 PROCEDURE — 700102 HCHG RX REV CODE 250 W/ 637 OVERRIDE(OP): Performed by: ANESTHESIOLOGY

## 2020-09-01 PROCEDURE — 700101 HCHG RX REV CODE 250: Performed by: ANESTHESIOLOGY

## 2020-09-01 PROCEDURE — 160035 HCHG PACU - 1ST 60 MINS PHASE I: Performed by: SURGERY

## 2020-09-01 PROCEDURE — 501838 HCHG SUTURE GENERAL: Performed by: SURGERY

## 2020-09-01 PROCEDURE — 160027 HCHG SURGERY MINUTES - 1ST 30 MINS LEVEL 2: Performed by: SURGERY

## 2020-09-01 PROCEDURE — 160046 HCHG PACU - 1ST 60 MINS PHASE II: Performed by: SURGERY

## 2020-09-01 PROCEDURE — 700102 HCHG RX REV CODE 250 W/ 637 OVERRIDE(OP): Performed by: SURGERY

## 2020-09-01 PROCEDURE — 700105 HCHG RX REV CODE 258: Performed by: SURGERY

## 2020-09-01 RX ORDER — HYDROMORPHONE HYDROCHLORIDE 1 MG/ML
0.2 INJECTION, SOLUTION INTRAMUSCULAR; INTRAVENOUS; SUBCUTANEOUS
Status: DISCONTINUED | OUTPATIENT
Start: 2020-09-01 | End: 2020-09-01 | Stop reason: HOSPADM

## 2020-09-01 RX ORDER — LABETALOL HYDROCHLORIDE 5 MG/ML
5 INJECTION, SOLUTION INTRAVENOUS
Status: DISCONTINUED | OUTPATIENT
Start: 2020-09-01 | End: 2020-09-01 | Stop reason: HOSPADM

## 2020-09-01 RX ORDER — ONDANSETRON 2 MG/ML
INJECTION INTRAMUSCULAR; INTRAVENOUS PRN
Status: DISCONTINUED | OUTPATIENT
Start: 2020-09-01 | End: 2020-09-01 | Stop reason: SURG

## 2020-09-01 RX ORDER — MIDAZOLAM HYDROCHLORIDE 1 MG/ML
1 INJECTION INTRAMUSCULAR; INTRAVENOUS
Status: DISCONTINUED | OUTPATIENT
Start: 2020-09-01 | End: 2020-09-01 | Stop reason: HOSPADM

## 2020-09-01 RX ORDER — HYDROCODONE BITARTRATE AND ACETAMINOPHEN 5; 325 MG/1; MG/1
1 TABLET ORAL EVERY 4 HOURS PRN
Qty: 15 TAB | Refills: 0 | Status: SHIPPED | OUTPATIENT
Start: 2020-09-01 | End: 2020-09-05

## 2020-09-01 RX ORDER — BUPIVACAINE HYDROCHLORIDE AND EPINEPHRINE 5; 5 MG/ML; UG/ML
INJECTION, SOLUTION EPIDURAL; INTRACAUDAL; PERINEURAL
Status: DISCONTINUED
Start: 2020-09-01 | End: 2020-09-01 | Stop reason: HOSPADM

## 2020-09-01 RX ORDER — CEFAZOLIN SODIUM 1 G/3ML
INJECTION, POWDER, FOR SOLUTION INTRAMUSCULAR; INTRAVENOUS PRN
Status: DISCONTINUED | OUTPATIENT
Start: 2020-09-01 | End: 2020-09-01 | Stop reason: SURG

## 2020-09-01 RX ORDER — HALOPERIDOL 5 MG/ML
1 INJECTION INTRAMUSCULAR
Status: DISCONTINUED | OUTPATIENT
Start: 2020-09-01 | End: 2020-09-01 | Stop reason: HOSPADM

## 2020-09-01 RX ORDER — OXYCODONE HYDROCHLORIDE AND ACETAMINOPHEN 5; 325 MG/1; MG/1
2 TABLET ORAL
Status: COMPLETED | OUTPATIENT
Start: 2020-09-01 | End: 2020-09-01

## 2020-09-01 RX ORDER — LIDOCAINE HYDROCHLORIDE 20 MG/ML
INJECTION, SOLUTION EPIDURAL; INFILTRATION; INTRACAUDAL; PERINEURAL PRN
Status: DISCONTINUED | OUTPATIENT
Start: 2020-09-01 | End: 2020-09-01 | Stop reason: SURG

## 2020-09-01 RX ORDER — HYDROMORPHONE HYDROCHLORIDE 1 MG/ML
0.1 INJECTION, SOLUTION INTRAMUSCULAR; INTRAVENOUS; SUBCUTANEOUS
Status: DISCONTINUED | OUTPATIENT
Start: 2020-09-01 | End: 2020-09-01 | Stop reason: HOSPADM

## 2020-09-01 RX ORDER — MEPERIDINE HYDROCHLORIDE 25 MG/ML
12.5 INJECTION INTRAMUSCULAR; INTRAVENOUS; SUBCUTANEOUS
Status: DISCONTINUED | OUTPATIENT
Start: 2020-09-01 | End: 2020-09-01 | Stop reason: HOSPADM

## 2020-09-01 RX ORDER — HYDROMORPHONE HYDROCHLORIDE 1 MG/ML
0.4 INJECTION, SOLUTION INTRAMUSCULAR; INTRAVENOUS; SUBCUTANEOUS
Status: DISCONTINUED | OUTPATIENT
Start: 2020-09-01 | End: 2020-09-01 | Stop reason: HOSPADM

## 2020-09-01 RX ORDER — BUPIVACAINE HYDROCHLORIDE AND EPINEPHRINE 5; 5 MG/ML; UG/ML
INJECTION, SOLUTION EPIDURAL; INTRACAUDAL; PERINEURAL
Status: DISCONTINUED | OUTPATIENT
Start: 2020-09-01 | End: 2020-09-01 | Stop reason: HOSPADM

## 2020-09-01 RX ORDER — OXYCODONE HYDROCHLORIDE AND ACETAMINOPHEN 5; 325 MG/1; MG/1
1 TABLET ORAL
Status: COMPLETED | OUTPATIENT
Start: 2020-09-01 | End: 2020-09-01

## 2020-09-01 RX ORDER — DIPHENHYDRAMINE HYDROCHLORIDE 50 MG/ML
12.5 INJECTION INTRAMUSCULAR; INTRAVENOUS
Status: DISCONTINUED | OUTPATIENT
Start: 2020-09-01 | End: 2020-09-01 | Stop reason: HOSPADM

## 2020-09-01 RX ORDER — SODIUM CHLORIDE, SODIUM LACTATE, POTASSIUM CHLORIDE, CALCIUM CHLORIDE 600; 310; 30; 20 MG/100ML; MG/100ML; MG/100ML; MG/100ML
INJECTION, SOLUTION INTRAVENOUS CONTINUOUS
Status: DISCONTINUED | OUTPATIENT
Start: 2020-09-01 | End: 2020-09-01 | Stop reason: HOSPADM

## 2020-09-01 RX ORDER — METOPROLOL TARTRATE 1 MG/ML
1 INJECTION, SOLUTION INTRAVENOUS
Status: DISCONTINUED | OUTPATIENT
Start: 2020-09-01 | End: 2020-09-01 | Stop reason: HOSPADM

## 2020-09-01 RX ORDER — ONDANSETRON 2 MG/ML
4 INJECTION INTRAMUSCULAR; INTRAVENOUS
Status: DISCONTINUED | OUTPATIENT
Start: 2020-09-01 | End: 2020-09-01 | Stop reason: HOSPADM

## 2020-09-01 RX ORDER — DEXAMETHASONE SODIUM PHOSPHATE 4 MG/ML
INJECTION, SOLUTION INTRA-ARTICULAR; INTRALESIONAL; INTRAMUSCULAR; INTRAVENOUS; SOFT TISSUE PRN
Status: DISCONTINUED | OUTPATIENT
Start: 2020-09-01 | End: 2020-09-01 | Stop reason: SURG

## 2020-09-01 RX ADMIN — ONDANSETRON 4 MG: 2 INJECTION INTRAMUSCULAR; INTRAVENOUS at 10:16

## 2020-09-01 RX ADMIN — SODIUM CHLORIDE, POTASSIUM CHLORIDE, SODIUM LACTATE AND CALCIUM CHLORIDE: 600; 310; 30; 20 INJECTION, SOLUTION INTRAVENOUS at 09:03

## 2020-09-01 RX ADMIN — LIDOCAINE HYDROCHLORIDE 50 MG: 20 INJECTION, SOLUTION EPIDURAL; INFILTRATION; INTRACAUDAL at 10:16

## 2020-09-01 RX ADMIN — EPHEDRINE SULFATE 25 MG: 50 INJECTION, SOLUTION INTRAVENOUS at 10:16

## 2020-09-01 RX ADMIN — FENTANYL CITRATE 100 MCG: 50 INJECTION INTRAMUSCULAR; INTRAVENOUS at 10:16

## 2020-09-01 RX ADMIN — CEFAZOLIN 2 G: 330 INJECTION, POWDER, FOR SOLUTION INTRAMUSCULAR; INTRAVENOUS at 10:16

## 2020-09-01 RX ADMIN — MIDAZOLAM 2 MG: 1 INJECTION INTRAMUSCULAR; INTRAVENOUS at 10:16

## 2020-09-01 RX ADMIN — DEXAMETHASONE SODIUM PHOSPHATE 4 MG: 4 INJECTION, SOLUTION INTRA-ARTICULAR; INTRALESIONAL; INTRAMUSCULAR; INTRAVENOUS; SOFT TISSUE at 10:16

## 2020-09-01 RX ADMIN — PROPOFOL 200 MG: 10 INJECTION, EMULSION INTRAVENOUS at 10:16

## 2020-09-01 RX ADMIN — OXYCODONE HYDROCHLORIDE AND ACETAMINOPHEN 1 TABLET: 5; 325 TABLET ORAL at 11:28

## 2020-09-01 RX ADMIN — POVIDONE-IODINE 15 ML: 10 SOLUTION TOPICAL at 09:03

## 2020-09-01 ASSESSMENT — PAIN DESCRIPTION - PAIN TYPE
TYPE: SURGICAL PAIN

## 2020-09-01 ASSESSMENT — PAIN SCALES - GENERAL: PAIN_LEVEL: 3

## 2020-09-01 ASSESSMENT — FIBROSIS 4 INDEX: FIB4 SCORE: 0.59

## 2020-09-01 NOTE — OR SURGEON
Immediate Post OP Note    PreOp Diagnosis: Incarcerated Ventral Hernia    PostOp Diagnosis: same    Procedure(s):  REPAIR, HERNIA, VENTRAL-INCARCERATED, POSSIBLE MESH - Wound Class: Clean    Surgeon(s):  Yahir Cunningham M.D.    Anesthesiologist/Type of Anesthesia:  Anesthesiologist: Herbie Jane M.D./General    Surgical Staff:  Assistant: TARA Mccartney  Circulator: Liz Quiroz R.N.  Relief Circulator: Sukhi Olson R.N.  Scrub Person: Aurea Moreno    Specimens removed if any:  * No specimens in log *    Estimated Blood Loss: minimal    Findings: incarcerated properitoneal fat, 2cm defect with good tissue around defect    Complications: no apparent complications.        9/1/2020 11:12 AM Yahir Cunningham M.D.

## 2020-09-01 NOTE — ANESTHESIA TIME REPORT
Anesthesia Start and Stop Event Times     Date Time Event    9/1/2020 0919 Ready for Procedure     1016 Anesthesia Start     1111 Anesthesia Stop        Responsible Staff  09/01/20    Name Role Begin End    Herbie Jane M.D. Anesth 1016 1111        Preop Diagnosis (Free Text):  Pre-op Diagnosis     VENTRAL HERNIA WITH OBSTRUCTION, WITHOUT GANGRENE        Preop Diagnosis (Codes):    Post op Diagnosis  Ventral hernia with obstruction and without gangrene      Premium Reason  Non-Premium    Comments:

## 2020-09-01 NOTE — ANESTHESIA PROCEDURE NOTES
Airway  Performed by: Herbie Jane M.D.  Authorized by: Herbie Jane M.D.     Location:  OR  Urgency:  Elective  Indications for Airway Management:  Anesthesia      Spontaneous Ventilation: absent    Sedation Level:  Deep  Preoxygenated: Yes    Final Airway Type:  Supraglottic airway  Final Supraglottic Airway:  Standard LMA    SGA Size:  4  Number of Attempts at Approach:  1

## 2020-09-01 NOTE — OR NURSING
1110 - Pt to PACU 14 from OR.  Bedside report from anesthesiologist and RN.  Attached to monitoring, VSS, breathing is calm and unlabored. Oral airway in place, 8L mask. There is a dressing covering medial/umbilical incision, no signs of drainage noted.     1112 - Oral airway removed, VSS, denies pain or nausea.     1125 - Pt medicated for mild abdominal pain, having some water.  Updated wife regarding pt status.  VSS, room air, denies nausea.     1205 - Pt stable to discharge Phase II.  VSS, minimal pain, no nausea at this time.  Report to STEVEN Fernandez.  Taken to phase II via gurney, able to ambulate to chair.  Wife and belongings bedside.

## 2020-09-01 NOTE — OR NURSING
1215 Received report from Rebecca HARRIS, assumed care of pt at this time. Pt sitting in recliner, Wife at side. Pt awaiting urge to void.     1250 Pt states he feels ready to void, ambulated to Br, gait steady, void x1. Pt dressed self without difficulty.     1305 Pt ambulated out, gait steady.

## 2020-09-01 NOTE — ANESTHESIA POSTPROCEDURE EVALUATION
Patient: Fredis Claudio Cocchiara    Procedure Summary     Date: 09/01/20 Room / Location: [facility] ROOM 21 / SURGERY SAME DAY [facility]    Anesthesia Start: 1016 Anesthesia Stop:     Procedure: REPAIR, HERNIA, VENTRAL-INCARCERATED, POSSIBLE MESH (N/A Abdomen) Diagnosis: (VENTRAL HERNIA WITH OBSTRUCTION, WITHOUT GANGRENE)    Surgeon: Yahir Cunningham M.D. Responsible Provider: Herbie Jane M.D.    Anesthesia Type: general ASA Status: 2          Final Anesthesia Type: general  Last vitals  BP   NIBP: 120/76    Temp   36.5 °C (97.7 °F)    Pulse   Pulse: 82   Resp   (!) 29    SpO2   97 %      Anesthesia Post Evaluation    Patient location during evaluation: PACU  Patient participation: complete - patient participated  Level of consciousness: awake and alert  Pain score: 3    Airway patency: patent  Anesthetic complications: no  Cardiovascular status: hemodynamically stable  Respiratory status: acceptable  Hydration status: euvolemic    PONV: none

## 2020-09-01 NOTE — DISCHARGE INSTRUCTIONS
ACTIVITY: Rest and take it easy for the first 24 hours.  A responsible adult is recommended to remain with you during that time.  It is normal to feel sleepy.  We encourage you to not do anything that requires balance, judgment or coordination.    MILD FLU-LIKE SYMPTOMS ARE NORMAL. YOU MAY EXPERIENCE GENERALIZED MUSCLE ACHES, THROAT IRRITATION, HEADACHE AND/OR SOME NAUSEA.    FOR 24 HOURS DO NOT:  Drive, operate machinery or run household appliances.  Drink beer or alcoholic beverages.   Make important decisions or sign legal documents.    SPECIAL INSTRUCTIONS: Ice pack intermittently for 48 hours.  Remove plastic and gauze (including gauze ball in belly button) in 3 days.  Leave underlying steristips on until they fall off.    DIET: To avoid nausea, slowly advance diet as tolerated, avoiding spicy or greasy foods for the first day.  Add more substantial food to your diet according to your physician's instructions.  Babies can be fed formula or breast milk as soon as they are hungry.  INCREASE FLUIDS AND FIBER TO AVOID CONSTIPATION.    SURGICAL DRESSING/BATHING: May shower tomorrow    FOLLOW-UP APPOINTMENT:  A follow-up appointment should be arranged with your doctor [phone]; call to schedule.    You should CALL YOUR PHYSICIAN if you develop:  Fever greater than 101 degrees F.  Pain not relieved by medication, or persistent nausea or vomiting.  Excessive bleeding (blood soaking through dressing) or unexpected drainage from the wound.  Extreme redness or swelling around the incision site, drainage of pus or foul smelling drainage.  Inability to urinate or empty your bladder within 8 hours.  Problems with breathing or chest pain.    You should call 911 if you develop problems with breathing or chest pain.  If you are unable to contact your doctor or surgical center, you should go to the nearest emergency room or urgent care center.  Physician's telephone #: [phone]  If any questions arise, call your  doctor.  If your doctor is not available, please feel free to call the Surgical Center at [phone].  The Center is open Monday through Friday from 7AM to 7PM.  You can also call the HEALTH HOTLINE open 24 hours/day, 7 days/week and speak to a nurse at [phone], or toll free at [phone].    A registered nurse may call you a few days after your surgery to see how you are doing after your procedure.    MEDICATIONS: Resume taking daily medication.  Take prescribed pain medication with food.  If no medication is prescribed, you may take non-aspirin pain medication if needed.  PAIN MEDICATION CAN BE VERY CONSTIPATING.  Take a stool softener or laxative such as senokot, pericolace, or milk of magnesia if needed.    Prescription given for Norco.  Last pain medication given at [facility] - 5mg oxycodone / 325 mg tylenol at 11:30am    If your physician has prescribed pain medication that includes Acetaminophen (Tylenol), do not take additional Acetaminophen (Tylenol) while taking the prescribed medication.    Depression / Suicide Risk    As you are discharged from this [facility] facility, it is important to learn how to keep safe from harming yourself.    Recognize the warning signs:  · Abrupt changes in personality, positive or negative- including increase in energy   · Giving away possessions  · Change in eating patterns- significant weight changes-  positive or negative  · Change in sleeping patterns- unable to sleep or sleeping all the time   · Unwillingness or inability to communicate  · Depression  · Unusual sadness, discouragement and loneliness  · Talk of wanting to die  · Neglect of personal appearance   · Rebelliousness- reckless behavior  · Withdrawal from people/activities they love  · Confusion- inability to concentrate     If you or a loved one observes any of these behaviors or has concerns about self-harm, here's what you can do:  · Talk about it- your feelings and reasons for harming  yourself  · Remove any means that you might use to hurt yourself (examples: pills, rope, extension cords, firearm)  · Get professional help from the community (Mental Health, Substance Abuse, psychological counseling)  · Do not be alone:Call your Safe Contact- someone whom you trust who will be there for you.  · Call your local CRISIS HOTLINE 323-4492 or 463-322-3001  · Call your local Children's Mobile Crisis Response Team [place] (356) 928-1003 or www.[employer] [facility]  · Call the toll free National Suicide Prevention Hotlines   · National Suicide Prevention Lifeline 171-657-UVOI (6563)  · National Hope Line Network 800-SUICIDE (748-8929)

## 2020-09-02 NOTE — OP REPORT
DATE OF SERVICE:  09/01/2020    SURGEON PRESENT:  Yahir Cunningham MD    ASSISTANT:  OUMOU Medina    ANESTHESIOLOGIST:  Herbie Jane MD    TYPE OF ANESTHETIC:  General anesthetic using laryngeal mask airway plus local   0.5% Marcaine with epinephrine.    PREOPERATIVE DIAGNOSIS:  Incarcerated umbilical hernia.    POSTOPERATIVE DIAGNOSIS:  Incarcerated umbilical hernia.    PROCEDURE:  Repair of incarcerated umbilical hernia with primary repair.    FINDINGS:  The patient is a 44-year-old male who presents with an increasingly   symptomatic umbilical hernia, which has become partially incarcerated.  He   has had the hernia for several years, but decided to have this repaired at   this time.  At the time of surgery, he was found to have incarcerated omentum   and preperitoneal fat.  Repair was performed using a primary repair.  There   were no apparent complications.    FINDINGS AND PROCEDURE:  The patient was brought to the operating room and   placed on the table in supine position.  General anesthetic was then provided   by the anesthesiologist, Dr. Jane, using laryngeal mask airway.  The abdomen   was then prepped and draped in usual sterile fashion.  A timeout was called.    The correct patient, correct diagnosis, correct surgery, and correct location   of the surgery were discussed and agreed upon.  He did receive preoperative   IV antibiotics.  The patient was noted to have a protruding umbilical hernia   primarily above the umbilicus.  A curvilinear incision was made in the skin   over the supraumbilical fold using #15 blade.  All bleeding was controlled   with electrocautery.  Dissection was then carried down through the   subcutaneous tissue until the hernia sac was encountered.  The hernia sac was   carefully dissected away from the overlying dermis of the umbilicus and   carried down to the midline fascia.  The umbilical stalk was divided at the   level of the fascia allowing better exposure  of the area.  Dissection was   carried out around the hernia sac until the origin at the midline fascia was   encountered.    The hernia sac was carefully dissected away from the fascia and reinvaginated   back into the peritoneal space.  A preperitoneal plane was then dissected in   order to provide enough place for sutures to be placed.  The defect was about   2x2 cm in size.  Therefore, a primary repair was performed using interrupted   sutures of 0 Ethibond suture.  This was done in a transverse direction.  Next,   the wound was irrigated and injected with 0.5% Marcaine.  The undersurface of   the umbilicus was sutured down to the midline fascia with 3-0 Vicryl suture   to provide a new inward appearance to the umbilicus.  The dermis of the   incision was then closed using running 3-0 Vicryl suture and the skin was   closed using running 4-0 Monocryl suture in subcuticular fashion.    Steri-Strips and sterile dressings were applied.  The patient did tolerate   procedure well without complications.  Final sponge and needle count were   correct.       ____________________________________     MD TERESITA SNOW / SHONDA    DD:  09/02/2020 07:52:25  DT:  09/02/2020 08:17:15    D#:  [id number]  Job#:  [id number]

## 2020-09-22 ENCOUNTER — APPOINTMENT (RX ONLY)
Dept: URBAN - METROPOLITAN AREA CLINIC 22 | Facility: CLINIC | Age: 45
Setting detail: DERMATOLOGY
End: 2020-09-22

## 2020-09-22 DIAGNOSIS — L91.8 OTHER HYPERTROPHIC DISORDERS OF THE SKIN: ICD-10-CM

## 2020-09-22 DIAGNOSIS — L82.1 OTHER SEBORRHEIC KERATOSIS: ICD-10-CM

## 2020-09-22 PROCEDURE — ? COUNSELING

## 2020-09-22 PROCEDURE — 99201: CPT

## 2020-09-22 ASSESSMENT — LOCATION ZONE DERM
LOCATION ZONE: TRUNK
LOCATION ZONE: AXILLAE
LOCATION ZONE: FACE

## 2020-09-22 ASSESSMENT — LOCATION DETAILED DESCRIPTION DERM
LOCATION DETAILED: LEFT CENTRAL MALAR CHEEK
LOCATION DETAILED: RIGHT RIB CAGE
LOCATION DETAILED: LEFT AXILLARY VAULT
LOCATION DETAILED: RIGHT LATERAL FOREHEAD

## 2020-09-22 ASSESSMENT — LOCATION SIMPLE DESCRIPTION DERM
LOCATION SIMPLE: ABDOMEN
LOCATION SIMPLE: RIGHT FOREHEAD
LOCATION SIMPLE: LEFT AXILLARY VAULT
LOCATION SIMPLE: LEFT CHEEK

## 2020-09-24 ENCOUNTER — OFFICE VISIT (OUTPATIENT)
Dept: MEDICAL GROUP | Facility: MEDICAL CENTER | Age: 45
End: 2020-09-24
Payer: COMMERCIAL

## 2020-09-24 VITALS
BODY MASS INDEX: 33.31 KG/M2 | HEIGHT: 69 IN | WEIGHT: 224.87 LBS | SYSTOLIC BLOOD PRESSURE: 128 MMHG | RESPIRATION RATE: 17 BRPM | TEMPERATURE: 97.7 F | DIASTOLIC BLOOD PRESSURE: 82 MMHG | OXYGEN SATURATION: 100 % | HEART RATE: 60 BPM

## 2020-09-24 DIAGNOSIS — F41.9 ANXIETY AND DEPRESSION: ICD-10-CM

## 2020-09-24 DIAGNOSIS — Z00.00 ANNUAL PHYSICAL EXAM: ICD-10-CM

## 2020-09-24 DIAGNOSIS — F32.A ANXIETY AND DEPRESSION: ICD-10-CM

## 2020-09-24 DIAGNOSIS — R74.8 ELEVATED LIVER ENZYMES: ICD-10-CM

## 2020-09-24 DIAGNOSIS — E78.1 HIGH TRIGLYCERIDES: ICD-10-CM

## 2020-09-24 DIAGNOSIS — R73.03 PREDIABETES: ICD-10-CM

## 2020-09-24 DIAGNOSIS — K21.9 GASTROESOPHAGEAL REFLUX DISEASE WITHOUT ESOPHAGITIS: ICD-10-CM

## 2020-09-24 PROCEDURE — 99396 PREV VISIT EST AGE 40-64: CPT | Performed by: NURSE PRACTITIONER

## 2020-09-24 SDOH — SOCIAL STABILITY: SOCIAL NETWORK
DO YOU BELONG TO ANY CLUBS OR ORGANIZATIONS SUCH AS CHURCH GROUPS UNIONS, FRATERNAL OR ATHLETIC GROUPS, OR SCHOOL GROUPS?: NO

## 2020-09-24 SDOH — ECONOMIC STABILITY: HOUSING INSECURITY
IN THE LAST 12 MONTHS, WAS THERE A TIME WHEN YOU DID NOT HAVE A STEADY PLACE TO SLEEP OR SLEPT IN A SHELTER (INCLUDING NOW)?: NO

## 2020-09-24 SDOH — ECONOMIC STABILITY: TRANSPORTATION INSECURITY
IN THE PAST 12 MONTHS, HAS LACK OF TRANSPORTATION KEPT YOU FROM MEETINGS, WORK, OR FROM GETTING THINGS NEEDED FOR DAILY LIVING?: NO

## 2020-09-24 SDOH — ECONOMIC STABILITY: INCOME INSECURITY: HOW HARD IS IT FOR YOU TO PAY FOR THE VERY BASICS LIKE FOOD, HOUSING, MEDICAL CARE, AND HEATING?: NOT VERY HARD

## 2020-09-24 SDOH — SOCIAL STABILITY: SOCIAL NETWORK: ARE YOU MARRIED, WIDOWED, DIVORCED, SEPARATED, NEVER MARRIED, OR LIVING WITH A PARTNER?: MARRIED

## 2020-09-24 SDOH — SOCIAL STABILITY: SOCIAL NETWORK: HOW OFTEN DO YOU GET TOGETHER WITH FRIENDS OR RELATIVES?: NEVER

## 2020-09-24 SDOH — ECONOMIC STABILITY: INCOME INSECURITY: IN THE LAST 12 MONTHS, WAS THERE A TIME WHEN YOU WERE NOT ABLE TO PAY THE MORTGAGE OR RENT ON TIME?: NO

## 2020-09-24 SDOH — ECONOMIC STABILITY: HOUSING INSECURITY: IN THE LAST 12 MONTHS, HOW MANY PLACES HAVE YOU LIVED?: 1

## 2020-09-24 SDOH — ECONOMIC STABILITY: TRANSPORTATION INSECURITY
IN THE PAST 12 MONTHS, HAS THE LACK OF TRANSPORTATION KEPT YOU FROM MEDICAL APPOINTMENTS OR FROM GETTING MEDICATIONS?: NO

## 2020-09-24 SDOH — HEALTH STABILITY: MENTAL HEALTH: HOW OFTEN DO YOU HAVE 6 OR MORE DRINKS ON ONE OCCASION?: NEVER

## 2020-09-24 SDOH — ECONOMIC STABILITY: FOOD INSECURITY: WITHIN THE PAST 12 MONTHS, YOU WORRIED THAT YOUR FOOD WOULD RUN OUT BEFORE YOU GOT MONEY TO BUY MORE.: NEVER TRUE

## 2020-09-24 SDOH — HEALTH STABILITY: PHYSICAL HEALTH: ON AVERAGE, HOW MANY MINUTES DO YOU ENGAGE IN EXERCISE AT THIS LEVEL?: 30 MIN

## 2020-09-24 SDOH — ECONOMIC STABILITY: FOOD INSECURITY: WITHIN THE PAST 12 MONTHS, THE FOOD YOU BOUGHT JUST DIDN'T LAST AND YOU DIDN'T HAVE MONEY TO GET MORE.: NEVER TRUE

## 2020-09-24 SDOH — HEALTH STABILITY: MENTAL HEALTH
STRESS IS WHEN SOMEONE FEELS TENSE, NERVOUS, ANXIOUS, OR CAN'T SLEEP AT NIGHT BECAUSE THEIR MIND IS TROUBLED. HOW STRESSED ARE YOU?: RATHER MUCH

## 2020-09-24 SDOH — SOCIAL STABILITY: SOCIAL NETWORK: IN A TYPICAL WEEK, HOW MANY TIMES DO YOU TALK ON THE PHONE WITH FAMILY, FRIENDS, OR NEIGHBORS?: THREE TIMES A WEEK

## 2020-09-24 SDOH — HEALTH STABILITY: PHYSICAL HEALTH: ON AVERAGE, HOW MANY DAYS PER WEEK DO YOU ENGAGE IN MODERATE TO STRENUOUS EXERCISE (LIKE A BRISK WALK)?: 2 DAYS

## 2020-09-24 SDOH — ECONOMIC STABILITY: TRANSPORTATION INSECURITY
IN THE PAST 12 MONTHS, HAS LACK OF RELIABLE TRANSPORTATION KEPT YOU FROM MEDICAL APPOINTMENTS, MEETINGS, WORK OR FROM GETTING THINGS NEEDED FOR DAILY LIVING?: NO

## 2020-09-24 SDOH — SOCIAL STABILITY: SOCIAL NETWORK: HOW OFTEN DO YOU ATTENT MEETINGS OF THE CLUB OR ORGANIZATION YOU BELONG TO?: NEVER

## 2020-09-24 SDOH — HEALTH STABILITY: MENTAL HEALTH: HOW MANY STANDARD DRINKS CONTAINING ALCOHOL DO YOU HAVE ON A TYPICAL DAY?: PATIENT DECLINED

## 2020-09-24 SDOH — HEALTH STABILITY: MENTAL HEALTH: HOW OFTEN DO YOU HAVE A DRINK CONTAINING ALCOHOL?: NEVER

## 2020-09-24 SDOH — HEALTH STABILITY: PHYSICAL HEALTH: ON AVERAGE, HOW MANY MINUTES DO YOU ENGAGE IN EXERCISE AT THIS LEVEL?: 30 MINUTES

## 2020-09-24 SDOH — SOCIAL STABILITY: SOCIAL NETWORK: HOW OFTEN DO YOU ATTEND CHURCH OR RELIGIOUS SERVICES?: NEVER

## 2020-09-24 ASSESSMENT — SOCIAL DETERMINANTS OF HEALTH (SDOH)
HOW OFTEN DO YOU HAVE A DRINK CONTAINING ALCOHOL: NEVER
HOW OFTEN DO YOU ATTEND CHURCH OR RELIGIOUS SERVICES?: NEVER
HOW OFTEN DO YOU ATTENT MEETINGS OF THE CLUB OR ORGANIZATION YOU BELONG TO?: NEVER
HOW MANY DRINKS CONTAINING ALCOHOL DO YOU HAVE ON A TYPICAL DAY WHEN YOU ARE DRINKING: DECLINE
WITHIN THE PAST 12 MONTHS, THE FOOD YOU BOUGHT JUST DIDN'T LAST AND YOU DIDN'T HAVE MONEY TO GET MORE: NEVER TRUE
WITHIN THE PAST 12 MONTHS, YOU WORRIED THAT YOUR FOOD WOULD RUN OUT BEFORE YOU GOT THE MONEY TO BUY MORE: NEVER TRUE
IN A TYPICAL WEEK, HOW MANY TIMES DO YOU TALK ON THE PHONE WITH FAMILY, FRIENDS, OR NEIGHBORS?: THREE TIMES A WEEK
HOW OFTEN DO YOU HAVE SIX OR MORE DRINKS ON ONE OCCASION: NEVER
HOW OFTEN DO YOU GET TOGETHER WITH FRIENDS OR RELATIVES?: NEVER
HOW HARD IS IT FOR YOU TO PAY FOR THE VERY BASICS LIKE FOOD, HOUSING, MEDICAL CARE, AND HEATING?: NOT VERY HARD
DO YOU BELONG TO ANY CLUBS OR ORGANIZATIONS SUCH AS CHURCH GROUPS UNIONS, FRATERNAL OR ATHLETIC GROUPS, OR SCHOOL GROUPS?: NO

## 2020-09-24 ASSESSMENT — FIBROSIS 4 INDEX: FIB4 SCORE: 0.59

## 2020-09-24 NOTE — ASSESSMENT & PLAN NOTE
Stable with use of omeprazole.  He does find the symptoms return when he tries to skip medication.  No dysphasia, chronic cough, chronic sore throat

## 2020-09-24 NOTE — PROGRESS NOTES
Chief Complaint   Patient presents with   • Annual Exam     no concerns     Fredis Perry is a 44 y.o. male here for annual exam     Prediabetes  Last A1c prediabetic at 6.2.  Reports that he is following a healthy diet, has been walking for exercise.  He has not lost any weight over the last year.  No polyuria, polydipsia, numbness or tingling in extremities.  No medications for this    Elevated liver enzymes  Most recent metabolic panel reviewed, completed just prior to his hernia surgery.  Liver enzymes in normal range    Anxiety and depression  Stable on Lexapro, no current feelings of depression    GERD (gastroesophageal reflux disease)  Stable with use of omeprazole.  He does find the symptoms return when he tries to skip medication.  No dysphasia, chronic cough, chronic sore throat    Current medicines (including changes today)  Current Outpatient Medications   Medication Sig Dispense Refill   • fenofibrate (TRICOR) 145 MG Tab TAKE ONE TABLET BY MOUTH DAILY 30 Tab 1   • nadolol (CORGARD) 20 MG Tab TAKE ONE TABLET BY MOUTH DAILY 90 Tab 3   • escitalopram (LEXAPRO) 20 MG tablet TAKE ONE TABLET BY MOUTH DAILY 90 Tab 3   • vitamin D (CHOLECALCIFEROL) 1000 UNIT Tab Take 1,000 Units by mouth every day.     • omeprazole (PRILOSEC) 20 MG delayed-release capsule Take 1 Cap by mouth every day. 30 Cap 0     No current facility-administered medications for this visit.      He  has a past medical history of Anxiety, Depression, GERD (gastroesophageal reflux disease), High cholesterol, Hyperlipidemia, Hypertension, and Indigestion.  He  has a past surgical history that includes tonsillectomy and ventral hernia repair (N/A, 9/1/2020).  Social History     Tobacco Use   • Smoking status: Never Smoker   • Smokeless tobacco: Never Used   Substance Use Topics   • Alcohol use: No     Alcohol/week: 0.0 oz     Frequency: Never     Drinks per session: Patient refused     Binge frequency: Never   • Drug use: No     Social  "History     Social History Narrative   • Not on file     Family History   Problem Relation Age of Onset   • Psychiatric Illness Mother    • Heart Disease Father    • Hypertension Father    • Hyperlipidemia Father    • Alcohol/Drug Father    • Diabetes Maternal Grandmother    • Heart Disease Maternal Grandfather    • Stroke Maternal Grandfather      Family Status   Relation Name Status   • Mo  Alive   • Fa  Alive   • Sis  Alive   • Bro 2 Alive   • MGMo     • MGFa  Alive   • Artemio  Alive   • Artemio  Alive   • Son  Alive         ROS  Problems listed discussed above, all other systems reviewed and negative     Objective:     /82 (BP Location: Left arm, Patient Position: Sitting, BP Cuff Size: Adult)   Pulse 60   Temp 36.5 °C (97.7 °F) (Temporal)   Resp 17   Ht 1.753 m (5' 9\")   Wt 102 kg (224 lb 13.9 oz)   SpO2 100%  Body mass index is 33.21 kg/m².  Physical Exam:  General: Alert, oriented in no acute distress.  Eye contact is good, speech is normal, affect calm  Lungs: clear to auscultation bilaterally, good aeration, normal effort. No wheeze/ rhonchi/ rales.  CV: regular rate and rhythm, S1, S2. No murmur, no JVD, no edema.   Abdomen: soft, nontender, BS x4  Ext: color normal, vascularity normal, temperature normal. No rash or lesions.    Assessment and Plan:   The following treatment plan was discussed  1. Annual physical exam  Normal physical exam. General health and wellness discussion including healthy diet, regular exercise. 2000 iu Vit d3 advised daily. Preventative health screenings as listed. Advised regular dental cleanings, eye exam yearly.  Patient plans to get flu vaccine at the pharmacy   2. Gastroesophageal reflux disease without esophagitis   stable, continue omeprazole   3. High triglycerides   managed with fenofibrate, update labs  Lipid Profile   4. Anxiety and depression   stable   5. Prediabetes   last A1c 6.2, reevaluate.  Discussed diet, exercise, weight loss " recommendations  HEMOGLOBIN A1C   6. Elevated liver enzymes   most recent metabolic panel reviewed, liver enzymes normal.       Educated in proper administration of medication(s) ordered today including safety, possible SE, risks, benefits, rationale and alternatives to therapy.     Followup: Pending labs             Please note that this dictation was created using voice recognition software. I have worked with consultants from the vendor as well as technical experts from [facility] to optimize the interface. I have made every reasonable attempt to correct obvious errors, but I expect that there are errors of grammar and possibly content that I did not discover before finalizing the note.

## 2020-09-24 NOTE — ASSESSMENT & PLAN NOTE
Last A1c prediabetic at 6.2.  Reports that he is following a healthy diet, has been walking for exercise.  He has not lost any weight over the last year.  No polyuria, polydipsia, numbness or tingling in extremities.  No medications for this

## 2020-09-24 NOTE — ASSESSMENT & PLAN NOTE
Most recent metabolic panel reviewed, completed just prior to his hernia surgery.  Liver enzymes in normal range

## 2020-09-26 ENCOUNTER — TELEMEDICINE (OUTPATIENT)
Dept: TELEHEALTH | Facility: TELEMEDICINE | Age: 45
End: 2020-09-26
Payer: COMMERCIAL

## 2020-09-26 DIAGNOSIS — K04.7 DENTAL INFECTION: ICD-10-CM

## 2020-09-26 PROCEDURE — 99214 OFFICE O/P EST MOD 30 MIN: CPT | Mod: 95,CR | Performed by: NURSE PRACTITIONER

## 2020-09-26 RX ORDER — AMOXICILLIN 500 MG/1
500 CAPSULE ORAL 3 TIMES DAILY
Qty: 21 CAP | Refills: 0 | Status: SHIPPED | OUTPATIENT
Start: 2020-09-26 | End: 2020-10-03

## 2020-09-26 NOTE — PROGRESS NOTES
Virtual Visit: Established Patient   This visit was conducted via Zoom using secure and encrypted videoconferencing technology. The patient was in a private location in the state of [place].    The patient's identity was confirmed and verbal consent was obtained for this virtual visit.    Subjective:   CC: Dental pain    Fredis Perry is a 44 y.o. male presenting for evaluation and management of:    Dental Pain    Patient reports worsening dental pain x 2 days. Pain is on right upper side. He is unable to see his dentist until next week. He describes the pain as aching. The pain is 8/10 in nature and constant.  He has been taking ibuprofen with no relief.  He does have associated thermal sensitivity and facial pain. Denies difficulty swallowing, SOB, chills.     ROS  Constitutional: Negative for fever, chills and malaise/fatigue.   HENT: Negative for congestion and sore throat.    Eyes: Negative for blurred vision, double vision and photophobia.   Respiratory: Negative for cough and shortness of breath.  Cardiovascular: Negative for chest pain and palpitations.   Gastrointestinal: Negative for heartburn, nausea, vomiting, abdominal pain, diarrhea and constipation.   Musculoskeletal: Negative for joint pain and myalgias.   Skin: Negative for itching and rash.   Neurological: Negative for dizziness, tingling and headaches.   Psychiatric/Behavioral: Negative for depression. The patient is not nervous/anxious.           No Known Allergies    Current medicines (including changes today)  Current Outpatient Medications   Medication Sig Dispense Refill   • amoxicillin (AMOXIL) 500 MG Cap Take 1 Cap by mouth 3 times a day for 7 days. 21 Cap 0   • fenofibrate (TRICOR) 145 MG Tab TAKE ONE TABLET BY MOUTH DAILY 30 Tab 1   • nadolol (CORGARD) 20 MG Tab TAKE ONE TABLET BY MOUTH DAILY 90 Tab 3   • escitalopram (LEXAPRO) 20 MG tablet TAKE ONE TABLET BY MOUTH DAILY 90 Tab 3   • vitamin D (CHOLECALCIFEROL) 1000 UNIT Tab Take  1,000 Units by mouth every day.     • omeprazole (PRILOSEC) 20 MG delayed-release capsule Take 1 Cap by mouth every day. 30 Cap 0     No current facility-administered medications for this visit.        Patient Active Problem List    Diagnosis Date Noted   • Incarcerated ventral hernia 09/01/2020     Priority: High   • History of vasectomy 07/16/2018   • High triglycerides 11/08/2017   • Prediabetes 11/08/2017   • Obesity (BMI 30-39.9) 11/08/2017   • Multiple joint pain 11/17/2016   • Elevated liver enzymes 04/16/2015   • Anxiety and depression 04/16/2015   • GERD (gastroesophageal reflux disease) 04/16/2015       Family History   Problem Relation Age of Onset   • Psychiatric Illness Mother    • Heart Disease Father    • Hypertension Father    • Hyperlipidemia Father    • Alcohol/Drug Father    • Diabetes Maternal Grandmother    • Heart Disease Maternal Grandfather    • Stroke Maternal Grandfather        He  has a past medical history of Anxiety, Depression, GERD (gastroesophageal reflux disease), High cholesterol, Hyperlipidemia, Hypertension, and Indigestion.  He  has a past surgical history that includes tonsillectomy and ventral hernia repair (N/A, 9/1/2020).       Objective:   Resp: 16 observed through virtual visit.  Afebrile per patient.  Wt: 224 lbs.      Physical Exam:  Constitutional: Alert, no distress, well-groomed.  Skin: No rashes in visible areas.  Eye: Round. Conjunctiva clear, lids normal. No icterus.   ENMT: Lips pink without lesions, good dentition, moist mucous membranes. Phonation normal.  Neck: No masses, no thyromegaly. Moves freely without pain.  Respiratory: Unlabored respiratory effort, no cough or audible wheeze  Psych: Alert and oriented x3, normal affect and mood.       Assessment and Plan:   The following treatment plan was discussed:     1. Dental infection  - amoxicillin (AMOXIL) 500 MG Cap; Take 1 Cap by mouth 3 times a day for 7 days.  Dispense: 21 Cap; Refill: 0    Plan of care,  medications and treatments reviewed with patient and or guardian.  Patient and or guardian voices understanding and agrees with the instructions provided. After visit summary reviewed with patient. Patient and or guardian understand the parameters for reevaluation and ER precautions discussed.     Follow up with dentist next week as discussed.   Please note that this dictation was created using voice recognition software. I have made every reasonable attempt to correct obvious errors, but I expect that there are errors of grammar and possibly content that I did not discover before finalizing the note.       Follow-up: No follow-ups on file.

## 2020-10-01 ENCOUNTER — APPOINTMENT (OUTPATIENT)
Dept: LAB | Facility: MEDICAL CENTER | Age: 45
End: 2020-10-01
Attending: NURSE PRACTITIONER
Payer: COMMERCIAL

## 2020-10-13 RX ORDER — ESCITALOPRAM OXALATE 20 MG/1
TABLET ORAL
Qty: 90 TAB | Refills: 3 | Status: SHIPPED | OUTPATIENT
Start: 2020-10-13 | End: 2021-10-05

## 2020-10-28 ENCOUNTER — HOSPITAL ENCOUNTER (OUTPATIENT)
Dept: LAB | Facility: MEDICAL CENTER | Age: 45
End: 2020-10-28
Attending: NURSE PRACTITIONER
Payer: COMMERCIAL

## 2020-10-28 DIAGNOSIS — E78.1 HIGH TRIGLYCERIDES: ICD-10-CM

## 2020-10-28 DIAGNOSIS — R73.03 PREDIABETES: ICD-10-CM

## 2020-10-28 LAB
CHOLEST SERPL-MCNC: 197 MG/DL (ref 100–199)
EST. AVERAGE GLUCOSE BLD GHB EST-MCNC: 154 MG/DL
FASTING STATUS PATIENT QL REPORTED: NORMAL
HBA1C MFR BLD: 7 % (ref 0–5.6)
HDLC SERPL-MCNC: 31 MG/DL
LDLC SERPL CALC-MCNC: 118 MG/DL
TRIGL SERPL-MCNC: 238 MG/DL (ref 0–149)

## 2020-10-28 PROCEDURE — 36415 COLL VENOUS BLD VENIPUNCTURE: CPT

## 2020-10-28 PROCEDURE — 83036 HEMOGLOBIN GLYCOSYLATED A1C: CPT

## 2020-10-28 PROCEDURE — 80061 LIPID PANEL: CPT

## 2020-11-03 ENCOUNTER — PATIENT MESSAGE (OUTPATIENT)
Dept: MEDICAL GROUP | Facility: MEDICAL CENTER | Age: 45
End: 2020-11-03

## 2020-11-03 NOTE — TELEPHONE ENCOUNTER
From: Fredis Perry  To: EVELYN Rvualcaba  Sent: 11/3/2020 1:32 PM PST  Subject: Test Result Question    Great, let's do that then.     Thanks       ----- Message -----   From:EVELYN Ruvalcaba   Sent:11/3/2020 1:11 PM PST   To:Fredis Perry   Subject:RE: Test Result Question    Nutrisystem is a good idea. As you lose weight your glucose will come down. There has been a recall on some of the manufacturers that make the extended release prescription, but not affecting all of them. There are many patients still taking it.      ----- Message -----   From:Fredis Perry   Sent:11/3/2020 11:53 AM PST   To:VEELYN Ruvalcaba   Subject:Test Result Question    I have a question about HEMOGLOBIN A1C resulted on 10/28/20, 11:14 PM.    I'm ok taking something if it will help.   But Isn't that the medication that they are currently recalling?    I also just signed up for [employer] to help with calorie intake and portion control. Hopefully that also helps a little.     Thanks

## 2020-11-16 DIAGNOSIS — F32.A ANXIETY AND DEPRESSION: ICD-10-CM

## 2020-11-16 DIAGNOSIS — F41.9 ANXIETY AND DEPRESSION: ICD-10-CM

## 2020-11-16 RX ORDER — NADOLOL 20 MG/1
TABLET ORAL
Qty: 90 TAB | Refills: 2 | Status: SHIPPED | OUTPATIENT
Start: 2020-11-16 | End: 2021-08-16

## 2020-11-21 DIAGNOSIS — E78.1 HIGH TRIGLYCERIDES: ICD-10-CM

## 2020-11-23 RX ORDER — FENOFIBRATE 145 MG/1
TABLET, COATED ORAL
Qty: 30 TAB | Refills: 0 | Status: SHIPPED | OUTPATIENT
Start: 2020-11-23 | End: 2020-12-22

## 2021-08-15 DIAGNOSIS — F32.A ANXIETY AND DEPRESSION: ICD-10-CM

## 2021-08-15 DIAGNOSIS — F41.9 ANXIETY AND DEPRESSION: ICD-10-CM

## 2021-08-16 RX ORDER — NADOLOL 20 MG/1
TABLET ORAL
Qty: 90 TABLET | Refills: 2 | Status: SHIPPED | OUTPATIENT
Start: 2021-08-16 | End: 2022-05-17 | Stop reason: SDUPTHER

## 2021-10-05 DIAGNOSIS — F32.A ANXIETY AND DEPRESSION: ICD-10-CM

## 2021-10-05 DIAGNOSIS — F41.9 ANXIETY AND DEPRESSION: ICD-10-CM

## 2021-10-05 RX ORDER — ESCITALOPRAM OXALATE 20 MG/1
TABLET ORAL
Qty: 90 TABLET | Refills: 0 | Status: SHIPPED | OUTPATIENT
Start: 2021-10-05 | End: 2022-01-16 | Stop reason: SDUPTHER

## 2021-10-05 NOTE — TELEPHONE ENCOUNTER
Received request via: Pharmacy    Was the patient seen in the last year in this department? No    Does the patient have an active prescription (recently filled or refills available) for medication(s) requested? No     Pt has upcoming appt

## 2021-10-07 ENCOUNTER — OFFICE VISIT (OUTPATIENT)
Dept: MEDICAL GROUP | Facility: MEDICAL CENTER | Age: 46
End: 2021-10-07
Payer: COMMERCIAL

## 2021-10-07 VITALS
WEIGHT: 225.53 LBS | OXYGEN SATURATION: 98 % | TEMPERATURE: 97.5 F | RESPIRATION RATE: 18 BRPM | HEIGHT: 69 IN | DIASTOLIC BLOOD PRESSURE: 72 MMHG | SYSTOLIC BLOOD PRESSURE: 106 MMHG | BODY MASS INDEX: 33.4 KG/M2 | HEART RATE: 54 BPM

## 2021-10-07 DIAGNOSIS — K21.9 GASTROESOPHAGEAL REFLUX DISEASE WITHOUT ESOPHAGITIS: ICD-10-CM

## 2021-10-07 DIAGNOSIS — E78.1 HIGH TRIGLYCERIDES: ICD-10-CM

## 2021-10-07 DIAGNOSIS — F41.9 ANXIETY AND DEPRESSION: ICD-10-CM

## 2021-10-07 DIAGNOSIS — R73.03 PREDIABETES: ICD-10-CM

## 2021-10-07 DIAGNOSIS — Z00.00 ANNUAL PHYSICAL EXAM: ICD-10-CM

## 2021-10-07 DIAGNOSIS — F32.A ANXIETY AND DEPRESSION: ICD-10-CM

## 2021-10-07 DIAGNOSIS — R74.8 ELEVATED LIVER ENZYMES: ICD-10-CM

## 2021-10-07 PROCEDURE — 99396 PREV VISIT EST AGE 40-64: CPT | Performed by: NURSE PRACTITIONER

## 2021-10-07 SDOH — HEALTH STABILITY: PHYSICAL HEALTH: ON AVERAGE, HOW MANY DAYS PER WEEK DO YOU ENGAGE IN MODERATE TO STRENUOUS EXERCISE (LIKE A BRISK WALK)?: 1 DAY

## 2021-10-07 SDOH — HEALTH STABILITY: PHYSICAL HEALTH: ON AVERAGE, HOW MANY MINUTES DO YOU ENGAGE IN EXERCISE AT THIS LEVEL?: 30 MIN

## 2021-10-07 SDOH — ECONOMIC STABILITY: INCOME INSECURITY: HOW HARD IS IT FOR YOU TO PAY FOR THE VERY BASICS LIKE FOOD, HOUSING, MEDICAL CARE, AND HEATING?: NOT VERY HARD

## 2021-10-07 SDOH — ECONOMIC STABILITY: HOUSING INSECURITY: IN THE LAST 12 MONTHS, HOW MANY PLACES HAVE YOU LIVED?: 1

## 2021-10-07 SDOH — ECONOMIC STABILITY: FOOD INSECURITY: WITHIN THE PAST 12 MONTHS, THE FOOD YOU BOUGHT JUST DIDN'T LAST AND YOU DIDN'T HAVE MONEY TO GET MORE.: NEVER TRUE

## 2021-10-07 SDOH — ECONOMIC STABILITY: FOOD INSECURITY: WITHIN THE PAST 12 MONTHS, YOU WORRIED THAT YOUR FOOD WOULD RUN OUT BEFORE YOU GOT MONEY TO BUY MORE.: NEVER TRUE

## 2021-10-07 SDOH — ECONOMIC STABILITY: INCOME INSECURITY: IN THE LAST 12 MONTHS, WAS THERE A TIME WHEN YOU WERE NOT ABLE TO PAY THE MORTGAGE OR RENT ON TIME?: NO

## 2021-10-07 SDOH — HEALTH STABILITY: MENTAL HEALTH
STRESS IS WHEN SOMEONE FEELS TENSE, NERVOUS, ANXIOUS, OR CAN'T SLEEP AT NIGHT BECAUSE THEIR MIND IS TROUBLED. HOW STRESSED ARE YOU?: TO SOME EXTENT

## 2021-10-07 ASSESSMENT — SOCIAL DETERMINANTS OF HEALTH (SDOH)
WITHIN THE PAST 12 MONTHS, YOU WORRIED THAT YOUR FOOD WOULD RUN OUT BEFORE YOU GOT THE MONEY TO BUY MORE: NEVER TRUE
DO YOU BELONG TO ANY CLUBS OR ORGANIZATIONS SUCH AS CHURCH GROUPS UNIONS, FRATERNAL OR ATHLETIC GROUPS, OR SCHOOL GROUPS?: NO
HOW MANY DRINKS CONTAINING ALCOHOL DO YOU HAVE ON A TYPICAL DAY WHEN YOU ARE DRINKING: PATIENT DECLINED
HOW OFTEN DO YOU HAVE A DRINK CONTAINING ALCOHOL: NEVER
HOW OFTEN DO YOU GET TOGETHER WITH FRIENDS OR RELATIVES?: ONCE A WEEK
DO YOU BELONG TO ANY CLUBS OR ORGANIZATIONS SUCH AS CHURCH GROUPS UNIONS, FRATERNAL OR ATHLETIC GROUPS, OR SCHOOL GROUPS?: NO
IN A TYPICAL WEEK, HOW MANY TIMES DO YOU TALK ON THE PHONE WITH FAMILY, FRIENDS, OR NEIGHBORS?: ONCE A WEEK
HOW OFTEN DO YOU HAVE SIX OR MORE DRINKS ON ONE OCCASION: NEVER
HOW OFTEN DO YOU GET TOGETHER WITH FRIENDS OR RELATIVES?: ONCE A WEEK
IN A TYPICAL WEEK, HOW MANY TIMES DO YOU TALK ON THE PHONE WITH FAMILY, FRIENDS, OR NEIGHBORS?: ONCE A WEEK
HOW OFTEN DO YOU ATTENT MEETINGS OF THE CLUB OR ORGANIZATION YOU BELONG TO?: NEVER
HOW OFTEN DO YOU ATTENT MEETINGS OF THE CLUB OR ORGANIZATION YOU BELONG TO?: NEVER
HOW OFTEN DO YOU ATTEND CHURCH OR RELIGIOUS SERVICES?: NEVER
HOW OFTEN DO YOU ATTEND CHURCH OR RELIGIOUS SERVICES?: NEVER
HOW HARD IS IT FOR YOU TO PAY FOR THE VERY BASICS LIKE FOOD, HOUSING, MEDICAL CARE, AND HEATING?: NOT VERY HARD

## 2021-10-07 ASSESSMENT — FIBROSIS 4 INDEX: FIB4 SCORE: 0.6

## 2021-10-07 ASSESSMENT — PATIENT HEALTH QUESTIONNAIRE - PHQ9: CLINICAL INTERPRETATION OF PHQ2 SCORE: 0

## 2021-10-07 ASSESSMENT — LIFESTYLE VARIABLES
HOW OFTEN DO YOU HAVE A DRINK CONTAINING ALCOHOL: NEVER
HOW OFTEN DO YOU HAVE SIX OR MORE DRINKS ON ONE OCCASION: NEVER
HOW MANY STANDARD DRINKS CONTAINING ALCOHOL DO YOU HAVE ON A TYPICAL DAY: PATIENT DECLINED

## 2021-10-07 NOTE — PROGRESS NOTES
Subjective:     Chief Complaint   Patient presents with   • Annual Exam     Fredis Perry is a 45 y.o. male here for annual and follow-up of chronic conditions since yesterday for someone ER    Elevated liver enzymes  History of liver enzymes but resolved metabolic panel from  August of last year.  Minimal alcohol consumption.  No complaints of abdominal pain    Anxiety and depression  Reports that he continues to do well on Lexapro 20 mg daily.  No present concerns with mood or anxiety    GERD (gastroesophageal reflux disease)  Controlled on omeprazole.  Symptoms return if he skips medication    Prediabetes  Last A1c had increased to diabetic range at 7.0.  He was started on Metformin at that time and reports that he is tolerating this well, 500 mg twice daily.  He did not follow-up with labs as planned  Watching his diet, low carbs, cut out sugar.  No regular exercise  No polyuria, polydipsia, numbness or tingling in extremities.  No vision changes       Current medicines (including changes today)  Current Outpatient Medications   Medication Sig Dispense Refill   • metFORMIN (GLUCOPHAGE) 500 MG Tab Take 1 Tablet by mouth 2 times a day with meals. 60 Tablet 5   • escitalopram (LEXAPRO) 20 MG tablet TAKE ONE TABLET BY MOUTH DAILY 90 Tablet 0   • nadolol (CORGARD) 20 MG Tab TAKE ONE TABLET BY MOUTH DAILY 90 Tablet 2   • fenofibrate (TRICOR) 145 MG Tab TAKE ONE TABLET BY MOUTH DAILY 30 Tab 11   • vitamin D (CHOLECALCIFEROL) 1000 UNIT Tab Take 1,000 Units by mouth every day.     • omeprazole (PRILOSEC) 20 MG delayed-release capsule Take 1 Cap by mouth every day. 30 Cap 0     No current facility-administered medications for this visit.     He  has a past medical history of Anxiety, Depression, GERD (gastroesophageal reflux disease), High cholesterol, Hyperlipidemia, Hypertension, and Indigestion.    ROS included above     Objective:     /72 (BP Location: Left arm, Patient Position: Sitting, BP Cuff Size:  "Adult)   Pulse (!) 54   Temp 36.4 °C (97.5 °F) (Temporal)   Resp 18   Ht 1.753 m (5' 9\")   Wt 102 kg (225 lb 8.5 oz)   SpO2 98%  Body mass index is 33.31 kg/m².     Physical Exam:  General: Alert, oriented in no acute distress.  Eye contact is good, speech is normal, affect calm  HEENT:  TMs gray with good landmarks bilaterally. No lymphadenopathy.  Lungs: clear to auscultation bilaterally, normal effort, no wheeze/ rhonchi/ rales.  CV: regular rate and rhythm, S1, S2, no murmur  Abdomen: soft, nontender, central obesity  Ext: no edema, color normal, vascularity normal, temperature normal    Assessment and Plan:   The following treatment plan was discussed   1. Annual physical exam  Normal physical exam. General health and wellness discussion including healthy diet, regular exercise. 2000 iu Vit d3 advised daily. Preventative health screenings -vaccines up-to-date.  Advised regular dental cleanings, eye exam yearly.     2. Prediabetes   last A1c had increased to 7.0, started on Metformin at that time.  Reports that he is up-to-date on his eye exam.  Overdue for labs, will complete in the next 1 to 2 weeks.  He is following healthy diet.  Encouraged regular exercise  Comp Metabolic Panel    HEMOGLOBIN A1C   3. High triglycerides   reports that he has been consistent with medication and following a healthy diet update labs  Lipid Profile   4. Elevated liver enzymes   history of elevated liver enzymes but resolved and most recent labs.  Continue to monitor.  No significant alcohol consumption   5. Anxiety and depression   stable on Lexapro   6. Gastroesophageal reflux disease without esophagitis   controlled with omeprazole       Followup: Patient to complete labs in the next 1 to 2 weeks       Please note that this dictation was created using voice recognition software. I have worked with consultants from the vendor as well as technical experts from [employer] to optimize the interface. I have made every " reasonable attempt to correct obvious errors, but I expect that there are errors of grammar and possibly content that I did not discover before finalizing the note.

## 2021-10-07 NOTE — ASSESSMENT & PLAN NOTE
History of liver enzymes but resolved metabolic panel from  August of last year.  Minimal alcohol consumption.  No complaints of abdominal pain

## 2021-10-07 NOTE — ASSESSMENT & PLAN NOTE
Reports that he continues to do well on Lexapro 20 mg daily.  No present concerns with mood or anxiety

## 2021-10-07 NOTE — ASSESSMENT & PLAN NOTE
Last A1c had increased to diabetic range at 7.0.  He was started on Metformin at that time and reports that he is tolerating this well, 500 mg twice daily.  He did not follow-up with labs as planned  Watching his diet, low carbs, cut out sugar.  No regular exercise  No polyuria, polydipsia, numbness or tingling in extremities.  No vision changes

## 2021-10-08 ENCOUNTER — HOSPITAL ENCOUNTER (OUTPATIENT)
Dept: LAB | Facility: MEDICAL CENTER | Age: 46
End: 2021-10-08
Attending: NURSE PRACTITIONER
Payer: COMMERCIAL

## 2021-10-08 DIAGNOSIS — R73.03 PREDIABETES: ICD-10-CM

## 2021-10-08 DIAGNOSIS — E78.1 HIGH TRIGLYCERIDES: ICD-10-CM

## 2021-10-08 LAB
ALBUMIN SERPL BCP-MCNC: 4.6 G/DL (ref 3.2–4.9)
ALBUMIN/GLOB SERPL: 2 G/DL
ALP SERPL-CCNC: 66 U/L (ref 30–99)
ALT SERPL-CCNC: 50 U/L (ref 2–50)
ANION GAP SERPL CALC-SCNC: 11 MMOL/L (ref 7–16)
AST SERPL-CCNC: 34 U/L (ref 12–45)
BILIRUB SERPL-MCNC: 0.3 MG/DL (ref 0.1–1.5)
BUN SERPL-MCNC: 9 MG/DL (ref 8–22)
CALCIUM SERPL-MCNC: 9.2 MG/DL (ref 8.5–10.5)
CHLORIDE SERPL-SCNC: 103 MMOL/L (ref 96–112)
CHOLEST SERPL-MCNC: 183 MG/DL (ref 100–199)
CO2 SERPL-SCNC: 25 MMOL/L (ref 20–33)
CREAT SERPL-MCNC: 1.31 MG/DL (ref 0.5–1.4)
EST. AVERAGE GLUCOSE BLD GHB EST-MCNC: 146 MG/DL
FASTING STATUS PATIENT QL REPORTED: NORMAL
GLOBULIN SER CALC-MCNC: 2.3 G/DL (ref 1.9–3.5)
GLUCOSE SERPL-MCNC: 137 MG/DL (ref 65–99)
HBA1C MFR BLD: 6.7 % (ref 4–5.6)
HDLC SERPL-MCNC: 29 MG/DL
LDLC SERPL CALC-MCNC: 122 MG/DL
POTASSIUM SERPL-SCNC: 4.1 MMOL/L (ref 3.6–5.5)
PROT SERPL-MCNC: 6.9 G/DL (ref 6–8.2)
SODIUM SERPL-SCNC: 139 MMOL/L (ref 135–145)
TRIGL SERPL-MCNC: 158 MG/DL (ref 0–149)

## 2021-10-08 PROCEDURE — 83036 HEMOGLOBIN GLYCOSYLATED A1C: CPT

## 2021-10-08 PROCEDURE — 80061 LIPID PANEL: CPT

## 2021-10-08 PROCEDURE — 80053 COMPREHEN METABOLIC PANEL: CPT

## 2021-10-08 PROCEDURE — 36415 COLL VENOUS BLD VENIPUNCTURE: CPT

## 2021-12-22 DIAGNOSIS — E78.1 HIGH TRIGLYCERIDES: ICD-10-CM

## 2021-12-23 RX ORDER — FENOFIBRATE 145 MG/1
145 TABLET, COATED ORAL DAILY
Qty: 30 TABLET | Refills: 11 | Status: SHIPPED | OUTPATIENT
Start: 2021-12-23 | End: 2022-12-21 | Stop reason: SDUPTHER

## 2021-12-23 NOTE — TELEPHONE ENCOUNTER
Received request via: Pharmacy    Was the patient seen in the last year in this department? Yes  10/7/2021  Does the patient have an active prescription (recently filled or refills available) for medication(s) requested? No

## 2022-02-09 PROBLEM — E11.9 TYPE 2 DIABETES MELLITUS WITHOUT COMPLICATION, WITHOUT LONG-TERM CURRENT USE OF INSULIN (HCC): Status: ACTIVE | Noted: 2017-11-08

## 2022-02-09 NOTE — PROGRESS NOTES
Subjective:     CC:  Diagnoses of Type 2 diabetes mellitus without complication, without long-term current use of insulin ([facility]), Hyperlipidemia associated with type 2 diabetes mellitus (HCC), High triglycerides, Anxiety and depression, Gastroesophageal reflux disease without esophagitis, Obesity (BMI 30-39.9), Need for vaccination, and Encounter to establish care with new doctor were pertinent to this visit.    HISTORY OF THE PRESENT ILLNESS: Patient is a 46 y.o. male. This pleasant patient is here today to establish care and discuss chronic conditions. His prior PCP was OUMOU Moralez.    Problem   High Triglycerides    Chronic condition.  Current regimen: fenofibrate 145mg daily  He reports compliance and/or tolerance and symptoms controlled with current medication(s). Does not need medication(s) refill. No acute concerns.     Type 2 diabetes mellitus without complication, without long-term current use of insulin ([facility])    Chronic condition. Onset around 02/2021.  Current medication regimen: metformin 500mg BID  Patient tolerating and reports compliant with medications. Does not need refill of medications today. Denies vision changes, dry mouth, chest pain, nausea/vomiting/diarrhea, polydipsia, polyphagia, polyuria, hypoglycemia, numbness/tingling. He checks his feet at home.  Last eye exam: due  Last foot exam: due  Diet: tries to eat healthy in general  Exercise: works out 2x/week  Vaccines: flu received 10/2021, Tdap received 10/2018, PPSV23 due     Obesity (Bmi 30-39.9)    Chronic condition. Normal weight about 220lb. He tries to eat healthy in general. He does regularly exercise with work out 2-3x/week.     Hyperlipidemia associated with type 2 diabetes mellitus (HCC)    Chronic condition. Patient reports he never took any statin medications in the past.  Current regimen: fenofibrate 145mg daily   He reports compliance and tolerating medication well. Denies musculoskeletal pain, headache, diarrhea. He does  not need medication refill today. No acute concerns at this time.       Anxiety and Depression    Chronic condition.  Current regimen: escitalopram 20mg daily, nadolol 20mg daily  He reports compliance and/or tolerance and symptoms controlled with current medication(s). Does not need medication(s) refill. No acute concerns.     Gerd (Gastroesophageal Reflux Disease)    Chronic condition.  Current regimen: OTC omeprazole 20mg daily  He reports compliance and/or tolerance and symptoms controlled with current medication(s). Does not need medication(s) refill. No acute concerns.         Current Outpatient Medications Ordered in Epic   Medication Sig Dispense Refill   • atorvastatin (LIPITOR) 10 MG Tab Take 1 Tablet by mouth every evening. 30 Tablet 2   • metFORMIN (GLUCOPHAGE) 500 MG Tab Take 2 Tablets by mouth 2 times a day with meals. 360 Tablet 3   • escitalopram (LEXAPRO) 20 MG tablet Take 1 Tablet by mouth every day. 90 Tablet 3   • fenofibrate (TRICOR) 145 MG Tab Take 1 Tablet by mouth every day. 30 Tablet 11   • nadolol (CORGARD) 20 MG Tab TAKE ONE TABLET BY MOUTH DAILY 90 Tablet 2   • vitamin D (CHOLECALCIFEROL) 1000 UNIT Tab Take 1,000 Units by mouth every day.     • omeprazole (PRILOSEC) 20 MG delayed-release capsule Take 1 Cap by mouth every day. 30 Cap 0     No current Epic-ordered facility-administered medications on file.     Social history  Living situation: lives with family and 4 children  Occupation: works as  and warehouse  Marital status:   Alcohol/tobacco/illicit drugs: former smoker 1.5 ppd x 10 years, quit 1998, denies EtOH or illicit drugs    Health Maintenance: reviewed and discussed with patient  Vaccines: due for PPSV23, flu received 10/2021, Tdap received 10/2018, Pfizer COVID #2 received 05/2021 (planned for booster vaccine 2/18/2022)    Patient desires PPSV23 vaccine today. Denies recent sickness. Never had allergic reaction to this vaccine in the past.    ROS:   Gen:  "no fevers/chills, no changes in weight  Eyes: no changes in vision  ENT: no sore throat  Pulm: no sob, no cough  CV: no chest pain, no palpitations  GI: no nausea/vomiting, no diarrhea  : no dysuria  MSk: no myalgias  Skin: no rash  Neuro: no headaches, no numbness/tingling      Objective:     Exam: /80 (BP Location: Left arm, Patient Position: Sitting, BP Cuff Size: Adult)   Pulse 64   Temp 36.6 °C (97.8 °F) (Temporal)   Resp 14   Ht 1.778 m (5' 10\")   Wt 102 kg (223 lb 15.8 oz)   SpO2 99%  Body mass index is 32.14 kg/m².    General: Normal appearing. No distress.  HEENT: Normocephalic. Eyes conjunctiva clear lids without ptosis, ears normal shape and contour, canals are clear bilaterally, tympanic membranes are benign, nasal mucosa benign, oropharynx is without erythema, edema or exudates.   Neck: Supple without JVD or bruit. Thyroid is not enlarged.  Pulmonary: Clear to ausculation. Normal effort. No rales, ronchi, or wheezing.  Cardiovascular: Regular rate and rhythm without murmur. Carotid and radial pulses are intact and equal bilaterally.  Abdomen: Soft, nontender, nondistended. Normal bowel sounds.  Neurologic: Grossly nonfocal  Skin: Warm and dry. No obvious lesions.  Musculoskeletal: Normal gait. No extremity cyanosis, clubbing, or edema.  Psych: Normal mood and affect. Alert and oriented x3. Judgment and insight is normal.  Monofilament testing with a 10 gram force: sensation intact: intact bilaterally  Visual Inspection: Feet without maceration, ulcers, fissures.  Pedal pulses: intact bilaterally    Labs:   Lab Results   Component Value Date/Time    SODIUM 139 10/08/2021 06:17 AM    POTASSIUM 4.1 10/08/2021 06:17 AM    CHLORIDE 103 10/08/2021 06:17 AM    CO2 25 10/08/2021 06:17 AM    ANION 11.0 10/08/2021 06:17 AM    GLUCOSE 137 (H) 10/08/2021 06:17 AM    BUN 9 10/08/2021 06:17 AM    CREATININE 1.31 10/08/2021 06:17 AM    CALCIUM 9.2 10/08/2021 06:17 AM    ASTSGOT 34 10/08/2021 06:17 AM    " ALTSGPT 50 10/08/2021 06:17 AM    TBILIRUBIN 0.3 10/08/2021 06:17 AM    ALBUMIN 4.6 10/08/2021 06:17 AM    TOTPROTEIN 6.9 10/08/2021 06:17 AM    GLOBULIN 2.3 10/08/2021 06:17 AM    AGRATIO 2.0 10/08/2021 06:17 AM     Lab Results   Component Value Date/Time    HBA1C 6.7 (H) 10/08/2021 06:17 AM      Lab Results   Component Value Date/Time    CHOLSTRLTOT 183 10/08/2021 0617    TRIGLYCERIDE 158 (H) 10/08/2021 0617    HDL 29 (A) 10/08/2021 0617     (H) 10/08/2021 0617       Assessment & Plan:   46 y.o. male with the following -    1. Type 2 diabetes mellitus without complication, without long-term current use of insulin (HCC)  Chronic condition, controlled with medications. Most recent A1C 6.7.  - increase metformin 500mg to 1000mg BID, add atorvastatin 10mg, uptitrate based on tolerability and/or response  - patient will schedule a retinal exam with his eye doctor  - Discussed daily self foot exam, eye care, and regular exercise with patient  - Patient instructed to follow a low carbohydrate diet  - Follow up in 3 months or earlier as needed  - PneumoVax PPV23 =>5yo  - Diabetic Monofilament LE Exam  - MICROALBUMIN CREAT RATIO URINE; Future  - atorvastatin (LIPITOR) 10 MG Tab; Take 1 Tablet by mouth every evening.  Dispense: 30 Tablet; Refill: 2  - metFORMIN (GLUCOPHAGE) 500 MG Tab; Take 2 Tablets by mouth 2 times a day with meals.  Dispense: 360 Tablet; Refill: 3  - HEMOGLOBIN A1C; Future  - Lipid Profile; Future  - Basic Metabolic Panel; Future    2. Hyperlipidemia associated with type 2 diabetes mellitus (HCC)  Chronic condition, uncontrolled.  - start atorvastatin 10mg daily, uptitrate based on tolerability and/or response  - f/u lipid panel in 3 months  - advised to maintain a healthy weight, regular aerobic exercise, and eating diets lower in saturated fats    3. High triglycerides  Chronic condition, sub-optimal.  - cont fenofibrate 145mg daily  - add atorvastatin 10mg daily, uptitrate based on tolerability  and/or response  - atorvastatin (LIPITOR) 10 MG Tab; Take 1 Tablet by mouth every evening.  Dispense: 30 Tablet; Refill: 2  - Lipid Profile; Future    4. Anxiety and depression  Chronic condition, stable.  - cont Lexapro 20mg daily and nadolol 20mg daily  - escitalopram (LEXAPRO) 20 MG tablet; Take 1 Tablet by mouth every day.  Dispense: 90 Tablet; Refill: 3    5. Gastroesophageal reflux disease without esophagitis  Chronic condition, stable.  - cont omeprazole 20mg daily    6. Obesity (BMI 30-39.9)  Body mass index is 32.14 kg/m².  - discussed lifestyle modifications including weight loss (lose 5-10% of current body weight, no more than 2 pounds per week), healthy diet (eat regular meals with a variety of food, limit daily intake of saturated fat and sodium, limit high-sugar foods, avoid sodas and alcohol, and stay physically active (at least 30 minutes of moderate-intensity physical activity 3-5x/week)   - pt was offered referral to dietician/nutrition specialist as per USPSTF guideline, patient declined today  - Patient identified as having weight management issue.  Appropriate orders and counseling given.    7. Need for vaccination  Pt desires PPSV23 vaccination. Risks and benefits discussed. No contraindications today. Vaccine given. Follow-up precautions discussed.  - PneumoVax PPV23 =>5yo    8. Encounter to establish care with new doctor      Return in about 3 months (around 5/17/2022) for f/u DM.    Please note that this dictation was created using voice recognition software. I have made every reasonable attempt to correct obvious errors, but I expect that there are errors of grammar and possibly content that I did not discover before finalizing the note.

## 2022-02-17 ENCOUNTER — OFFICE VISIT (OUTPATIENT)
Dept: MEDICAL GROUP | Facility: MEDICAL CENTER | Age: 47
End: 2022-02-17
Payer: COMMERCIAL

## 2022-02-17 VITALS
BODY MASS INDEX: 32.07 KG/M2 | TEMPERATURE: 97.8 F | WEIGHT: 223.99 LBS | OXYGEN SATURATION: 99 % | DIASTOLIC BLOOD PRESSURE: 80 MMHG | HEART RATE: 64 BPM | RESPIRATION RATE: 14 BRPM | SYSTOLIC BLOOD PRESSURE: 122 MMHG | HEIGHT: 70 IN

## 2022-02-17 DIAGNOSIS — Z23 NEED FOR VACCINATION: ICD-10-CM

## 2022-02-17 DIAGNOSIS — E11.9 TYPE 2 DIABETES MELLITUS WITHOUT COMPLICATION, WITHOUT LONG-TERM CURRENT USE OF INSULIN (HCC): ICD-10-CM

## 2022-02-17 DIAGNOSIS — F41.9 ANXIETY AND DEPRESSION: ICD-10-CM

## 2022-02-17 DIAGNOSIS — F32.A ANXIETY AND DEPRESSION: ICD-10-CM

## 2022-02-17 DIAGNOSIS — E78.5 HYPERLIPIDEMIA ASSOCIATED WITH TYPE 2 DIABETES MELLITUS (HCC): ICD-10-CM

## 2022-02-17 DIAGNOSIS — E78.1 HIGH TRIGLYCERIDES: ICD-10-CM

## 2022-02-17 DIAGNOSIS — E66.9 OBESITY (BMI 30-39.9): ICD-10-CM

## 2022-02-17 DIAGNOSIS — E11.69 HYPERLIPIDEMIA ASSOCIATED WITH TYPE 2 DIABETES MELLITUS (HCC): ICD-10-CM

## 2022-02-17 DIAGNOSIS — Z76.89 ENCOUNTER TO ESTABLISH CARE WITH NEW DOCTOR: ICD-10-CM

## 2022-02-17 DIAGNOSIS — K21.9 GASTROESOPHAGEAL REFLUX DISEASE WITHOUT ESOPHAGITIS: ICD-10-CM

## 2022-02-17 PROCEDURE — 90732 PPSV23 VACC 2 YRS+ SUBQ/IM: CPT | Performed by: STUDENT IN AN ORGANIZED HEALTH CARE EDUCATION/TRAINING PROGRAM

## 2022-02-17 PROCEDURE — 90471 IMMUNIZATION ADMIN: CPT | Performed by: STUDENT IN AN ORGANIZED HEALTH CARE EDUCATION/TRAINING PROGRAM

## 2022-02-17 PROCEDURE — 99214 OFFICE O/P EST MOD 30 MIN: CPT | Mod: 25 | Performed by: STUDENT IN AN ORGANIZED HEALTH CARE EDUCATION/TRAINING PROGRAM

## 2022-02-17 RX ORDER — ATORVASTATIN CALCIUM 10 MG/1
10 TABLET, FILM COATED ORAL NIGHTLY
Qty: 30 TABLET | Refills: 2 | Status: SHIPPED | OUTPATIENT
Start: 2022-02-17 | End: 2022-05-16

## 2022-02-17 RX ORDER — ESCITALOPRAM OXALATE 20 MG/1
20 TABLET ORAL DAILY
Qty: 90 TABLET | Refills: 3 | Status: SHIPPED | OUTPATIENT
Start: 2022-02-17 | End: 2023-03-13

## 2022-02-17 ASSESSMENT — ANXIETY QUESTIONNAIRES
7. FEELING AFRAID AS IF SOMETHING AWFUL MIGHT HAPPEN: SEVERAL DAYS
IF YOU CHECKED OFF ANY PROBLEMS ON THIS QUESTIONNAIRE, HOW DIFFICULT HAVE THESE PROBLEMS MADE IT FOR YOU TO DO YOUR WORK, TAKE CARE OF THINGS AT HOME, OR GET ALONG WITH OTHER PEOPLE: SOMEWHAT DIFFICULT
2. NOT BEING ABLE TO STOP OR CONTROL WORRYING: NOT AT ALL
GAD7 TOTAL SCORE: 5
4. TROUBLE RELAXING: SEVERAL DAYS
6. BECOMING EASILY ANNOYED OR IRRITABLE: SEVERAL DAYS
5. BEING SO RESTLESS THAT IT IS HARD TO SIT STILL: NOT AT ALL
3. WORRYING TOO MUCH ABOUT DIFFERENT THINGS: SEVERAL DAYS
1. FEELING NERVOUS, ANXIOUS, OR ON EDGE: SEVERAL DAYS

## 2022-02-17 ASSESSMENT — PATIENT HEALTH QUESTIONNAIRE - PHQ9: CLINICAL INTERPRETATION OF PHQ2 SCORE: 0

## 2022-02-17 ASSESSMENT — FIBROSIS 4 INDEX: FIB4 SCORE: 0.67

## 2022-04-18 PROBLEM — Z13.79 GENETIC SCREENING: Status: ACTIVE | Noted: 2022-04-18

## 2022-04-18 PROBLEM — K62.5 BRIGHT RED BLOOD PER RECTUM: Status: ACTIVE | Noted: 2022-04-18

## 2022-04-18 NOTE — PROGRESS NOTES
Virtual Visit: Established Patient   This visit was conducted via Zoom using secure and encrypted videoconferencing technology.   The patient was in their home in the state of [place].    The patient's identity was confirmed and verbal consent was obtained for this virtual visit.    Subjective:   CC:   Chief Complaint   Patient presents with   • Referral Needed     GI problem     Fredis Perry is a 46 y.o. male presenting for evaluation and management of:    Problem   Hemorrhoid    Chronic condition. He reports diagnosed with internal and external hemorrhoids 20+ years ago by general practitioner and has been having on and off bleeding with defecation.     Bright Red Blood Per Rectum    Chronic condition.    Character: both blood dripping into toilet after defecation and spots of blood seen on toilet paper  Onset: 20 years+, worse for past 1.5 months  Location: anal  Duration: intermittent  Exacerbating factors: unknown  Relieving factors: unknown  Associated symptoms: none, denies pain with defecation but rather discomfort with noticing blood  Severity: has been more frequent    Denies anal pain during defecation. Denies night sweats, fever, or weight loss. He reports mother and maternal grandmother both had benign colon polyps. Denies personal or family history of colon cancer.     Genetic Screening    Discussed and offered the no cost genetic screening through [employer] [facility]. Patient is interested in participating.         ROS   See HPI    Current medicines (including changes today)  Current Outpatient Medications   Medication Sig Dispense Refill   • atorvastatin (LIPITOR) 10 MG Tab Take 1 Tablet by mouth every evening. 30 Tablet 2   • metFORMIN (GLUCOPHAGE) 500 MG Tab Take 2 Tablets by mouth 2 times a day with meals. 360 Tablet 3   • escitalopram (LEXAPRO) 20 MG tablet Take 1 Tablet by mouth every day. 90 Tablet 3   • fenofibrate (TRICOR) 145 MG Tab Take 1 Tablet by mouth every day. 30 Tablet 11  "  • nadolol (CORGARD) 20 MG Tab TAKE ONE TABLET BY MOUTH DAILY 90 Tablet 2   • vitamin D (CHOLECALCIFEROL) 1000 UNIT Tab Take 1,000 Units by mouth every day.     • omeprazole (PRILOSEC) 20 MG delayed-release capsule Take 1 Cap by mouth every day. 30 Cap 0     No current facility-administered medications for this visit.       Patient Active Problem List    Diagnosis Date Noted   • Hemorrhoid 04/19/2022   • Bright red blood per rectum 04/18/2022   • Genetic screening 04/18/2022   • Incarcerated ventral hernia 09/01/2020   • History of vasectomy 07/16/2018   • High triglycerides 11/08/2017   • Type 2 diabetes mellitus without complication, without long-term current use of insulin (HCC) 11/08/2017   • Obesity (BMI 30-39.9) 11/08/2017   • Multiple joint pain 11/17/2016   • Hyperlipidemia associated with type 2 diabetes mellitus (HCC) 04/16/2015   • Elevated liver enzymes 04/16/2015   • Anxiety and depression 04/16/2015   • GERD (gastroesophageal reflux disease) 04/16/2015        Objective:   Ht 1.778 m (5' 10\")   Wt 104 kg (230 lb)   BMI 33.00 kg/m²     Patient-reported vitals at home  119/68 HR 79 (4/18/2022)  117/74 HR 65 (4/19/2022)    Physical Exam: limited exam due to virtual visit  Constitutional: Alert, no distress, well-groomed.  [place]: Alert and oriented x3, normal conversation.     Assessment and Plan:   The following treatment plan was discussed:     1. Bright red blood per rectum  Chronic condition, persistent, new to me. Suspect due to chronic hemorrhoids but need to rule out other causes such as polyps or malignancy. Will refer to GI for further evaluation. Check labs and refer for [facility] [place] [facility] as well.  - CBC WITH DIFFERENTIAL; Future  - FERRITIN; Future  - Referral to Gastroenterology    2. Hemorrhoids, unspecified hemorrhoid type  Chronic condition, persistent. Likely contributing to above.  - advised to continue healthy diet with regular exercise, maintain adequate hydration and dietary " fiber intake  - Referral to Gastroenterology    3. Genetic screening  - Referral to Genetic Research Studies      Follow-up: No follow-ups on file.

## 2022-04-19 ENCOUNTER — TELEMEDICINE (OUTPATIENT)
Dept: MEDICAL GROUP | Facility: MEDICAL CENTER | Age: 47
End: 2022-04-19
Payer: COMMERCIAL

## 2022-04-19 VITALS — HEIGHT: 70 IN | WEIGHT: 230 LBS | BODY MASS INDEX: 32.93 KG/M2

## 2022-04-19 DIAGNOSIS — K62.5 BRIGHT RED BLOOD PER RECTUM: ICD-10-CM

## 2022-04-19 DIAGNOSIS — Z13.79 GENETIC SCREENING: ICD-10-CM

## 2022-04-19 DIAGNOSIS — K64.9 HEMORRHOIDS, UNSPECIFIED HEMORRHOID TYPE: ICD-10-CM

## 2022-04-19 PROCEDURE — 99213 OFFICE O/P EST LOW 20 MIN: CPT | Mod: 95 | Performed by: STUDENT IN AN ORGANIZED HEALTH CARE EDUCATION/TRAINING PROGRAM

## 2022-04-19 ASSESSMENT — FIBROSIS 4 INDEX: FIB4 SCORE: 0.67

## 2022-04-20 ENCOUNTER — RESEARCH ENCOUNTER (OUTPATIENT)
Dept: MEDICAL GROUP | Facility: PHYSICIAN GROUP | Age: 47
End: 2022-04-20
Attending: STUDENT IN AN ORGANIZED HEALTH CARE EDUCATION/TRAINING PROGRAM
Payer: COMMERCIAL

## 2022-04-20 DIAGNOSIS — Z00.6 RESEARCH STUDY PATIENT: ICD-10-CM

## 2022-05-13 ENCOUNTER — HOSPITAL ENCOUNTER (OUTPATIENT)
Dept: LAB | Facility: MEDICAL CENTER | Age: 47
End: 2022-05-13
Attending: STUDENT IN AN ORGANIZED HEALTH CARE EDUCATION/TRAINING PROGRAM
Payer: COMMERCIAL

## 2022-05-13 DIAGNOSIS — K62.5 BRIGHT RED BLOOD PER RECTUM: ICD-10-CM

## 2022-05-13 DIAGNOSIS — E78.1 HIGH TRIGLYCERIDES: ICD-10-CM

## 2022-05-13 DIAGNOSIS — E11.9 TYPE 2 DIABETES MELLITUS WITHOUT COMPLICATION, WITHOUT LONG-TERM CURRENT USE OF INSULIN (HCC): ICD-10-CM

## 2022-05-13 LAB
ANION GAP SERPL CALC-SCNC: 8 MMOL/L (ref 7–16)
ANISOCYTOSIS BLD QL SMEAR: ABNORMAL
BASOPHILS # BLD AUTO: 1.3 % (ref 0–1.8)
BASOPHILS # BLD: 0.06 K/UL (ref 0–0.12)
BUN SERPL-MCNC: 14 MG/DL (ref 8–22)
BURR CELLS BLD QL SMEAR: NORMAL
CALCIUM SERPL-MCNC: 9.1 MG/DL (ref 8.5–10.5)
CHLORIDE SERPL-SCNC: 102 MMOL/L (ref 96–112)
CHOLEST SERPL-MCNC: 124 MG/DL (ref 100–199)
CO2 SERPL-SCNC: 25 MMOL/L (ref 20–33)
COMMENT 1642: NORMAL
CREAT SERPL-MCNC: 1.15 MG/DL (ref 0.5–1.4)
CREAT UR-MCNC: 282.56 MG/DL
EOSINOPHIL # BLD AUTO: 0.23 K/UL (ref 0–0.51)
EOSINOPHIL NFR BLD: 4.8 % (ref 0–6.9)
ERYTHROCYTE [DISTWIDTH] IN BLOOD BY AUTOMATED COUNT: 43.6 FL (ref 35.9–50)
EST. AVERAGE GLUCOSE BLD GHB EST-MCNC: 134 MG/DL
FASTING STATUS PATIENT QL REPORTED: NORMAL
FERRITIN SERPL-MCNC: 9.9 NG/ML (ref 22–322)
GFR SERPLBLD CREATININE-BSD FMLA CKD-EPI: 80 ML/MIN/1.73 M 2
GLUCOSE SERPL-MCNC: 113 MG/DL (ref 65–99)
HBA1C MFR BLD: 6.3 % (ref 4–5.6)
HCT VFR BLD AUTO: 38.4 % (ref 42–52)
HDLC SERPL-MCNC: 27 MG/DL
HGB BLD-MCNC: 11 G/DL (ref 14–18)
IMM GRANULOCYTES # BLD AUTO: 0.01 K/UL (ref 0–0.11)
IMM GRANULOCYTES NFR BLD AUTO: 0.2 % (ref 0–0.9)
LDLC SERPL CALC-MCNC: 76 MG/DL
LYMPHOCYTES # BLD AUTO: 1.92 K/UL (ref 1–4.8)
LYMPHOCYTES NFR BLD: 40.1 % (ref 22–41)
MACROCYTES BLD QL SMEAR: ABNORMAL
MCH RBC QN AUTO: 20.9 PG (ref 27–33)
MCHC RBC AUTO-ENTMCNC: 28.6 G/DL (ref 33.7–35.3)
MCV RBC AUTO: 73 FL (ref 81.4–97.8)
MICROALBUMIN UR-MCNC: 1.4 MG/DL
MICROALBUMIN/CREAT UR: 5 MG/G (ref 0–30)
MICROCYTES BLD QL SMEAR: ABNORMAL
MONOCYTES # BLD AUTO: 0.46 K/UL (ref 0–0.85)
MONOCYTES NFR BLD AUTO: 9.6 % (ref 0–13.4)
MORPHOLOGY BLD-IMP: NORMAL
NEUTROPHILS # BLD AUTO: 2.11 K/UL (ref 1.82–7.42)
NEUTROPHILS NFR BLD: 44 % (ref 44–72)
NRBC # BLD AUTO: 0 K/UL
NRBC BLD-RTO: 0 /100 WBC
OVALOCYTES BLD QL SMEAR: NORMAL
PLATELET # BLD AUTO: 337 K/UL (ref 164–446)
PLATELET BLD QL SMEAR: NORMAL
PMV BLD AUTO: 11 FL (ref 9–12.9)
POIKILOCYTOSIS BLD QL SMEAR: NORMAL
POLYCHROMASIA BLD QL SMEAR: NORMAL
POTASSIUM SERPL-SCNC: 5 MMOL/L (ref 3.6–5.5)
RBC # BLD AUTO: 5.26 M/UL (ref 4.7–6.1)
RBC BLD AUTO: PRESENT
SODIUM SERPL-SCNC: 135 MMOL/L (ref 135–145)
TRIGL SERPL-MCNC: 103 MG/DL (ref 0–149)
WBC # BLD AUTO: 4.8 K/UL (ref 4.8–10.8)

## 2022-05-13 PROCEDURE — 36415 COLL VENOUS BLD VENIPUNCTURE: CPT

## 2022-05-13 PROCEDURE — 83036 HEMOGLOBIN GLYCOSYLATED A1C: CPT

## 2022-05-13 PROCEDURE — 80061 LIPID PANEL: CPT

## 2022-05-13 PROCEDURE — 82570 ASSAY OF URINE CREATININE: CPT

## 2022-05-13 PROCEDURE — 82043 UR ALBUMIN QUANTITATIVE: CPT

## 2022-05-13 PROCEDURE — 82728 ASSAY OF FERRITIN: CPT

## 2022-05-13 PROCEDURE — 85025 COMPLETE CBC W/AUTO DIFF WBC: CPT

## 2022-05-13 PROCEDURE — 80048 BASIC METABOLIC PNL TOTAL CA: CPT

## 2022-05-17 ENCOUNTER — PATIENT MESSAGE (OUTPATIENT)
Dept: MEDICAL GROUP | Facility: MEDICAL CENTER | Age: 47
End: 2022-05-17
Payer: COMMERCIAL

## 2022-05-17 DIAGNOSIS — F41.9 ANXIETY AND DEPRESSION: ICD-10-CM

## 2022-05-17 DIAGNOSIS — F32.A ANXIETY AND DEPRESSION: ICD-10-CM

## 2022-05-17 RX ORDER — NADOLOL 20 MG/1
20 TABLET ORAL DAILY
Qty: 90 TABLET | Refills: 3 | Status: SHIPPED | OUTPATIENT
Start: 2022-05-17 | End: 2023-05-07

## 2022-05-19 LAB
APOB+LDLR+PCSK9 GENE MUT ANL BLD/T: NOT DETECTED
BRCA1+BRCA2 DEL+DUP + FULL MUT ANL BLD/T: NOT DETECTED
MLH1+MSH2+MSH6+PMS2 GN DEL+DUP+FUL M: NOT DETECTED

## 2022-05-20 ENCOUNTER — OFFICE VISIT (OUTPATIENT)
Dept: MEDICAL GROUP | Facility: MEDICAL CENTER | Age: 47
End: 2022-05-20
Payer: COMMERCIAL

## 2022-05-20 VITALS
HEART RATE: 63 BPM | RESPIRATION RATE: 16 BRPM | HEIGHT: 70 IN | BODY MASS INDEX: 31.62 KG/M2 | DIASTOLIC BLOOD PRESSURE: 60 MMHG | WEIGHT: 220.9 LBS | TEMPERATURE: 97 F | SYSTOLIC BLOOD PRESSURE: 112 MMHG | OXYGEN SATURATION: 97 %

## 2022-05-20 DIAGNOSIS — E78.1 HIGH TRIGLYCERIDES: ICD-10-CM

## 2022-05-20 DIAGNOSIS — K64.9 HEMORRHOIDS, UNSPECIFIED HEMORRHOID TYPE: ICD-10-CM

## 2022-05-20 DIAGNOSIS — H61.21 RIGHT EAR IMPACTED CERUMEN: ICD-10-CM

## 2022-05-20 DIAGNOSIS — D50.8 OTHER IRON DEFICIENCY ANEMIA: ICD-10-CM

## 2022-05-20 DIAGNOSIS — E78.5 HYPERLIPIDEMIA ASSOCIATED WITH TYPE 2 DIABETES MELLITUS (HCC): ICD-10-CM

## 2022-05-20 DIAGNOSIS — E11.69 HYPERLIPIDEMIA ASSOCIATED WITH TYPE 2 DIABETES MELLITUS (HCC): ICD-10-CM

## 2022-05-20 DIAGNOSIS — E11.9 TYPE 2 DIABETES MELLITUS WITHOUT COMPLICATION, WITHOUT LONG-TERM CURRENT USE OF INSULIN (HCC): ICD-10-CM

## 2022-05-20 PROBLEM — D50.9 IRON DEFICIENCY ANEMIA: Status: ACTIVE | Noted: 2022-05-20

## 2022-05-20 PROCEDURE — 99214 OFFICE O/P EST MOD 30 MIN: CPT | Performed by: STUDENT IN AN ORGANIZED HEALTH CARE EDUCATION/TRAINING PROGRAM

## 2022-05-20 ASSESSMENT — FIBROSIS 4 INDEX: FIB4 SCORE: 0.63

## 2022-05-20 NOTE — PROGRESS NOTES
Subjective:     CC: follow up chronic conditions    HPI:   Fredis presents today for follow up chronic conditions    Problem   Iron Deficiency Anemia    New condition. He reports has been having intermittent rectal bleeding for the past year. He does feel fatigue sometimes. He is pending appointment with GI.     Right Ear Impacted Cerumen    Acute condition for 3 weeks.     Hemorrhoid    Chronic condition. He reports diagnosed with internal and external hemorrhoids 20+ years ago by general practitioner and has been having on and off bleeding with defecation. He does feel fatigue sometimes. He is pending appointment with GI.     High Triglycerides    Chronic condition.  Current regimen: fenofibrate 145mg daily  He reports compliance and/or tolerance and symptoms controlled with current medication(s). Does not need medication(s) refill. No acute concerns.     Type 2 diabetes mellitus without complication, without long-term current use of insulin (HCC)    Chronic condition. Onset around 02/2021.  Current medication regimen: metformin 1000mg BID  Patient tolerating and reports compliant with medications. Does not need refill of medications today. Denies vision changes, dry mouth, chest pain, nausea/vomiting/diarrhea, polydipsia, polyphagia, polyuria, hypoglycemia, numbness/tingling. He checks his feet at home.  Last eye exam: done 04/2022  Last foot exam: done 02/2022  Diet: tries to eat healthy in general  Exercise: works out 2x/week  Vaccines: flu received 10/2021, Tdap received 10/2018, PPSV23 received 02/2022     Hyperlipidemia associated with type 2 diabetes mellitus (HCC)    Chronic condition.  Current regimen: atorvastatin 10mg qhs, fenofibrate 145mg daily   He reports compliance and tolerating medication well. Denies musculoskeletal pain, headache, diarrhea. He does not need medication refill today. No acute concerns at this time.           Current Outpatient Medications Ordered in Epic   Medication Sig Dispense  "Refill   • nadolol (CORGARD) 20 MG Tab Take 1 Tablet by mouth every day. 90 Tablet 3   • atorvastatin (LIPITOR) 10 MG Tab TAKE ONE TABLET BY MOUTH EVERY EVENING 90 Tablet 3   • metFORMIN (GLUCOPHAGE) 500 MG Tab Take 2 Tablets by mouth 2 times a day with meals. 360 Tablet 3   • escitalopram (LEXAPRO) 20 MG tablet Take 1 Tablet by mouth every day. 90 Tablet 3   • fenofibrate (TRICOR) 145 MG Tab Take 1 Tablet by mouth every day. 30 Tablet 11   • vitamin D (CHOLECALCIFEROL) 1000 UNIT Tab Take 1,000 Units by mouth every day.     • omeprazole (PRILOSEC) 20 MG delayed-release capsule Take 1 Cap by mouth every day. 30 Cap 0     No current Epic-ordered facility-administered medications on file.     Social history  Living situation: lives with family and 4 children  Occupation: works as  and warehouse  Marital status:   Alcohol/tobacco/illicit drugs: former smoker 1.5 ppd x 10 years, quit 1998, denies EtOH or illicit drugs     Health Maintenance: reviewed and discussed with patient  Vaccines: PPSV23 received 02/2022, flu received 10/2021, Tdap received 10/2018, Pfizer COVID #2 received 05/2021 (planned for booster vaccine 2/18/2022)     ROS:   Gen: no fevers/chills, no changes in weight  Eyes: no changes in vision  ENT: no sore throat, + right ear discomfort  Pulm: no sob, no cough  CV: no chest pain, no palpitations  GI: no nausea/vomiting, no diarrhea  : no dysuria  MSk: no myalgias  Skin: no rash  Neuro: no headaches, no numbness/tingling    Objective:     Exam:  /60 (BP Location: Left arm, Patient Position: Sitting, BP Cuff Size: Adult)   Pulse 63   Temp 36.1 °C (97 °F) (Temporal)   Resp 16   Ht 1.778 m (5' 10\")   Wt 100 kg (220 lb 14.4 oz)   SpO2 97%   BMI 31.70 kg/m²  Body mass index is 31.7 kg/m².    General: Normal appearing. No distress.  HEENT: Normocephalic. Mild right ear cerumen impaction noted.   Pulmonary: Clear to ausculation. Normal effort. No rales, ronchi, or " wheezing.  Cardiovascular: Regular rate and rhythm without murmur.  Musculoskeletal: Normal gait. No extremity cyanosis, clubbing, or edema.  Psych: Normal mood and affect. Alert and oriented x3. Judgment and insight is normal.    Labs:   Lab Results   Component Value Date/Time    WBC 4.8 05/13/2022 07:13 AM    RBC 5.26 05/13/2022 07:13 AM    HEMOGLOBIN 11.0 (L) 05/13/2022 07:13 AM    HEMATOCRIT 38.4 (L) 05/13/2022 07:13 AM    MCV 73.0 (L) 05/13/2022 07:13 AM    MCH 20.9 (L) 05/13/2022 07:13 AM    MCHC 28.6 (L) 05/13/2022 07:13 AM    RDW 43.6 05/13/2022 07:13 AM    PLATELETCT 337 05/13/2022 07:13 AM    MPV 11.0 05/13/2022 07:13 AM      Lab Results   Component Value Date/Time    SODIUM 135 05/13/2022 07:13 AM    POTASSIUM 5.0 05/13/2022 07:13 AM    CHLORIDE 102 05/13/2022 07:13 AM    CO2 25 05/13/2022 07:13 AM    ANION 8.0 05/13/2022 07:13 AM    GLUCOSE 113 (H) 05/13/2022 07:13 AM    BUN 14 05/13/2022 07:13 AM    CREATININE 1.15 05/13/2022 07:13 AM    CALCIUM 9.1 05/13/2022 07:13 AM    ASTSGOT 34 10/08/2021 06:17 AM    ALTSGPT 50 10/08/2021 06:17 AM    TBILIRUBIN 0.3 10/08/2021 06:17 AM    ALBUMIN 4.6 10/08/2021 06:17 AM    TOTPROTEIN 6.9 10/08/2021 06:17 AM    GLOBULIN 2.3 10/08/2021 06:17 AM    AGRATIO 2.0 10/08/2021 06:17 AM     Lab Results   Component Value Date/Time    HBA1C 6.3 (H) 05/13/2022 07:13 AM      Lab Results   Component Value Date/Time    CHOLSTRLTOT 124 05/13/2022 0713    TRIGLYCERIDE 103 05/13/2022 0713    HDL 27 (A) 05/13/2022 0713    LDL 76 05/13/2022 0713     Lab Results   Component Value Date/Time    MALBCRT 5 05/13/2022 07:13 AM    MICROALBUR 1.4 05/13/2022 07:13 AM      Lab Results   Component Value Date/Time    TSHULTRASEN 2.420 11/29/2017 0632       Assessment & Plan:     46 y.o. male with the following -     1. Type 2 diabetes mellitus without complication, without long-term current use of insulin (HCC)  Chronic condition, controlled with medications. Most recent A1C 6.3.  - cont current  medication: metformin 1000mg BID  - Discussed daily self foot exam, eye care, and regular exercise with patient  - Patient instructed to follow a low carbohydrate diet  - Follow up in 6 months or earlier as needed    2. Hyperlipidemia associated with type 2 diabetes mellitus (HCC)  Chronic condition, stable.  - cont current regimen: atorvastatin 10mg daily    3. High triglycerides  Chronic condition, stable.  - cont current regimen: fenofibrate 145mg daily    4. Other iron deficiency anemia  New condition. Suspect related to rectal bleeding from hemorrhoids. Advised to start ferrous sulfate 325mg daily or every other day. He is pending appointment with GI.   - CBC WITH DIFFERENTIAL; Future  - FERRITIN; Future  - IRON/TOTAL IRON BIND; Future    5. Hemorrhoids, unspecified hemorrhoid type  Chronic condition, stable. Has been asymptomatic for the past 2 weeks. He is pending appointment with GI    6. Right ear impacted cerumen  Acute condition, mild. Discussed option for ear lavage today or trial home Debrox. Patient would like to trial home Debrox first. Will let us know if symptoms do not improve.    Return in about 6 months (around 11/20/2022) for chronic medical conditions.    Please note that this dictation was created using voice recognition software. I have made every reasonable attempt to correct obvious errors, but I expect that there are errors of grammar and possibly content that I did not discover before finalizing the note.

## 2022-11-19 ENCOUNTER — HOSPITAL ENCOUNTER (OUTPATIENT)
Dept: LAB | Facility: MEDICAL CENTER | Age: 47
End: 2022-11-19
Attending: STUDENT IN AN ORGANIZED HEALTH CARE EDUCATION/TRAINING PROGRAM
Payer: COMMERCIAL

## 2022-11-19 DIAGNOSIS — D50.8 OTHER IRON DEFICIENCY ANEMIA: ICD-10-CM

## 2022-11-19 LAB
ANISOCYTOSIS BLD QL SMEAR: ABNORMAL
BASOPHILS # BLD AUTO: 1.6 % (ref 0–1.8)
BASOPHILS # BLD: 0.06 K/UL (ref 0–0.12)
BURR CELLS BLD QL SMEAR: NORMAL
COMMENT 1642: NORMAL
EOSINOPHIL # BLD AUTO: 0.13 K/UL (ref 0–0.51)
EOSINOPHIL NFR BLD: 3.5 % (ref 0–6.9)
ERYTHROCYTE [DISTWIDTH] IN BLOOD BY AUTOMATED COUNT: 43.1 FL (ref 35.9–50)
FERRITIN SERPL-MCNC: 9.9 NG/ML (ref 22–322)
HCT VFR BLD AUTO: 41 % (ref 42–52)
HGB BLD-MCNC: 12 G/DL (ref 14–18)
HYPOCHROMIA BLD QL SMEAR: ABNORMAL
IMM GRANULOCYTES # BLD AUTO: 0.02 K/UL (ref 0–0.11)
IMM GRANULOCYTES NFR BLD AUTO: 0.5 % (ref 0–0.9)
IRON SATN MFR SERPL: 6 % (ref 15–55)
IRON SERPL-MCNC: 32 UG/DL (ref 50–180)
LYMPHOCYTES # BLD AUTO: 1.43 K/UL (ref 1–4.8)
LYMPHOCYTES NFR BLD: 38.3 % (ref 22–41)
MCH RBC QN AUTO: 21.6 PG (ref 27–33)
MCHC RBC AUTO-ENTMCNC: 29.3 G/DL (ref 33.7–35.3)
MCV RBC AUTO: 73.7 FL (ref 81.4–97.8)
MICROCYTES BLD QL SMEAR: ABNORMAL
MONOCYTES # BLD AUTO: 0.31 K/UL (ref 0–0.85)
MONOCYTES NFR BLD AUTO: 8.3 % (ref 0–13.4)
MORPHOLOGY BLD-IMP: NORMAL
NEUTROPHILS # BLD AUTO: 1.78 K/UL (ref 1.82–7.42)
NEUTROPHILS NFR BLD: 47.8 % (ref 44–72)
NRBC # BLD AUTO: 0 K/UL
NRBC BLD-RTO: 0 /100 WBC
OVALOCYTES BLD QL SMEAR: NORMAL
PLATELET # BLD AUTO: 369 K/UL (ref 164–446)
PLATELET BLD QL SMEAR: NORMAL
PMV BLD AUTO: 10.7 FL (ref 9–12.9)
POIKILOCYTOSIS BLD QL SMEAR: NORMAL
RBC # BLD AUTO: 5.56 M/UL (ref 4.7–6.1)
RBC BLD AUTO: PRESENT
TIBC SERPL-MCNC: 511 UG/DL (ref 250–450)
UIBC SERPL-MCNC: 479 UG/DL (ref 110–370)
WBC # BLD AUTO: 3.7 K/UL (ref 4.8–10.8)

## 2022-11-19 PROCEDURE — 83550 IRON BINDING TEST: CPT

## 2022-11-19 PROCEDURE — 82728 ASSAY OF FERRITIN: CPT

## 2022-11-19 PROCEDURE — 85025 COMPLETE CBC W/AUTO DIFF WBC: CPT

## 2022-11-19 PROCEDURE — 36415 COLL VENOUS BLD VENIPUNCTURE: CPT

## 2022-11-19 PROCEDURE — 83540 ASSAY OF IRON: CPT

## 2022-11-21 NOTE — PROGRESS NOTES
Subjective:     CC: chronic conditions follow up    HPI:   Fredis presents today for chronic conditions follow up    Problem   Iron Deficiency Anemia    Chronic condition. Since last visit, he has seen GI and EGD/colonoscopy were overall normal. He is planned for capsule endoscopy.     He reports has been having intermittent rectal bleeding for the past year. He does feel fatigue sometimes. He is pending appointment with GI.     Preventative Health Care    He is due for annual lab work.     Type 2 diabetes mellitus without complication, without long-term current use of insulin (HCC)    Chronic condition. Onset around 02/2021.  Current medication regimen: metformin 1000mg BID  Patient tolerating and reports compliant with medications. Does not need refill of medications today. Denies vision changes, dry mouth, chest pain, nausea/vomiting/diarrhea, polydipsia, polyphagia, polyuria, hypoglycemia, numbness/tingling. He checks his feet at home.  Last eye exam: done 04/2022  Last foot exam: done 02/2022  Diet: tries to eat healthy in general  Exercise: works out 2x/week  Vaccines: flu received 10/2021, Tdap received 10/2018, PPSV23 received 02/2022     Obesity (Bmi 30-39.9)    Chronic condition. Normal weight about 220lb. He tries to eat healthy in general. He does regularly exercise with work out 2-3x/week.     Hyperlipidemia associated with type 2 diabetes mellitus (HCC)    Chronic condition.  Current regimen: atorvastatin 10mg qhs, fenofibrate 145mg daily   He reports compliance and tolerating medication well. Denies musculoskeletal pain, headache, diarrhea. He does not need medication refill today. No acute concerns at this time.       Anxiety and Depression    Chronic condition.  Current regimen: escitalopram 20mg daily, nadolol 20mg daily  He reports compliance and/or tolerance and symptoms controlled with current medication(s). Does not need medication(s) refill. No acute concerns.     Gerd (Gastroesophageal Reflux  Disease)    Chronic condition.  Current regimen: OTC omeprazole 20mg daily  He reports compliance and/or tolerance and symptoms controlled with current medication(s). Does not need medication(s) refill. No acute concerns.     Elevated Liver Enzymes (Resolved)       Current Outpatient Medications Ordered in Epic   Medication Sig Dispense Refill    nadolol (CORGARD) 20 MG Tab Take 1 Tablet by mouth every day. 90 Tablet 3    atorvastatin (LIPITOR) 10 MG Tab TAKE ONE TABLET BY MOUTH EVERY EVENING 90 Tablet 3    metFORMIN (GLUCOPHAGE) 500 MG Tab Take 2 Tablets by mouth 2 times a day with meals. 360 Tablet 3    escitalopram (LEXAPRO) 20 MG tablet Take 1 Tablet by mouth every day. 90 Tablet 3    fenofibrate (TRICOR) 145 MG Tab Take 1 Tablet by mouth every day. 30 Tablet 11    vitamin D (CHOLECALCIFEROL) 1000 UNIT Tab Take 1,000 Units by mouth every day.      omeprazole (PRILOSEC) 20 MG delayed-release capsule Take 1 Cap by mouth every day. 30 Cap 0     No current Epic-ordered facility-administered medications on file.     Social history  Living situation: lives with family and 4 children  Occupation: works as  and [employer]  Marital status:   Alcohol/tobacco/illicit drugs: former smoker 1.5 ppd x 10 years, quit 1998, denies EtOH or illicit drugs     Health Maintenance: reviewed and discussed with patient  Vaccines: due for flu, Tdap received 10/2018, PPSV23 02/2022, Pfizer COVID #2 received 05/2021 (planned for booster vaccine 2/18/2022)     ROS:   Gen: no fevers/chills, no changes in weight  Eyes: no changes in vision  ENT: no sore throat  Pulm: no sob, no cough  CV: no chest pain, no palpitations  GI: no nausea/vomiting, no diarrhea  : no dysuria  MSk: no myalgias  Skin: no rash  Neuro: no headaches, no numbness/tingling    Objective:     Exam:  /62 (BP Location: Left arm, Patient Position: Sitting, BP Cuff Size: Adult)   Pulse 71   Temp 37 °C (98.6 °F) (Temporal)   Resp 16   Ht 1.778 m  "(5' 10\")   Wt 98 kg (216 lb 0.8 oz)   SpO2 97%   BMI 31.00 kg/m²  Body mass index is 31 kg/m².    General: Normal appearing. No distress.  Pulmonary: Clear to ausculation. Normal effort. No rales, ronchi, or wheezing.  Cardiovascular: Regular rate and rhythm without murmur. Carotid and radial pulses are intact and equal bilaterally.  Abdomen: Soft, nontender, nondistended. Normal bowel sounds.  Neurologic: Grossly nonfocal  Skin: Warm and dry. No obvious lesions.  Musculoskeletal: Normal gait. No extremity cyanosis, clubbing, or edema.  Psych: Normal mood and affect. Alert and oriented x3. Judgment and insight is normal.    Labs:   Lab Results   Component Value Date/Time    WBC 3.7 (L) 11/19/2022 10:52 AM    RBC 5.56 11/19/2022 10:52 AM    HEMOGLOBIN 12.0 (L) 11/19/2022 10:52 AM    HEMATOCRIT 41.0 (L) 11/19/2022 10:52 AM    MCV 73.7 (L) 11/19/2022 10:52 AM    MCH 21.6 (L) 11/19/2022 10:52 AM    MCHC 29.3 (L) 11/19/2022 10:52 AM    RDW 43.1 11/19/2022 10:52 AM    PLATELETCT 369 11/19/2022 10:52 AM    MPV 10.7 11/19/2022 10:52 AM      Lab Results   Component Value Date/Time    SODIUM 135 05/13/2022 07:13 AM    POTASSIUM 5.0 05/13/2022 07:13 AM    CHLORIDE 102 05/13/2022 07:13 AM    CO2 25 05/13/2022 07:13 AM    ANION 8.0 05/13/2022 07:13 AM    GLUCOSE 113 (H) 05/13/2022 07:13 AM    BUN 14 05/13/2022 07:13 AM    CREATININE 1.15 05/13/2022 07:13 AM    CALCIUM 9.1 05/13/2022 07:13 AM    ASTSGOT 34 10/08/2021 06:17 AM    ALTSGPT 50 10/08/2021 06:17 AM    TBILIRUBIN 0.3 10/08/2021 06:17 AM    ALBUMIN 4.6 10/08/2021 06:17 AM    TOTPROTEIN 6.9 10/08/2021 06:17 AM    GLOBULIN 2.3 10/08/2021 06:17 AM    AGRATIO 2.0 10/08/2021 06:17 AM     Lab Results   Component Value Date/Time    CHOLSTRLTOT 124 05/13/2022 0713    TRIGLYCERIDE 103 05/13/2022 0713    HDL 27 (A) 05/13/2022 0713    LDL 76 05/13/2022 0713     Lab Results   Component Value Date/Time    HBA1C 5.9 (A) 11/22/2022 07:51 AM        Assessment & Plan:     47 y.o. male " with the following -     1. Type 2 diabetes mellitus without complication, without long-term current use of insulin (HCC)  Chronic condition, controlled with medications. Most recent A1C 5.9.  - cont current medication: metformin 1000mg BID  - Discussed daily self foot exam, eye care, and regular exercise with patient  - Patient instructed to follow a low carbohydrate diet  - Follow up in 3 months or earlier as needed  - POCT Hemoglobin A1C  - HEMOGLOBIN A1C; Future  - MICROALBUMIN CREAT RATIO URINE; Future    2. Hyperlipidemia associated with type 2 diabetes mellitus (HCC)  Chronic condition, stable.  - cont current regimen: atorvastatin 10mg daily  - Lipid Profile; Future  - TSH WITH REFLEX TO FT4; Future    3. Other iron deficiency anemia  Chronic condition, persistent.  - advised to start iron supplement every other day  - plan to recheck prior to next visit  - CBC WITH DIFFERENTIAL; Future  - IRON/TOTAL IRON BIND; Future  - FERRITIN; Future    4. Anxiety and depression  Chronic condition, stable.  - cont current regimen: escitalopram 20mg daily  - TSH WITH REFLEX TO FT4; Future    5. Gastroesophageal reflux disease without esophagitis  Chronic condition, stable.  - cont current regimen: omeprazole 20mg daily    6. Obesity (BMI 30-39.9)  - Advised healthy diet/weight loss, regular exercise  - Comp Metabolic Panel; Future  - HEMOGLOBIN A1C; Future  - Lipid Profile; Future  - TSH WITH REFLEX TO FT4; Future    7. Need for hepatitis C screening test  - HEP C VIRUS ANTIBODY; Future    8. Preventative health care  - CBC WITH DIFFERENTIAL; Future  - Comp Metabolic Panel; Future  - HEMOGLOBIN A1C; Future  - Lipid Profile; Future  - TSH WITH REFLEX TO FT4; Future  - HEP C VIRUS ANTIBODY; Future    Return in about 6 months (around 5/22/2023) for chronic medical conditions.    Please note that this dictation was created using voice recognition software. I have made every reasonable attempt to correct obvious errors, but I  expect that there are errors of grammar and possibly content that I did not discover before finalizing the note.

## 2022-11-22 ENCOUNTER — OFFICE VISIT (OUTPATIENT)
Dept: MEDICAL GROUP | Facility: MEDICAL CENTER | Age: 47
End: 2022-11-22
Payer: COMMERCIAL

## 2022-11-22 VITALS
OXYGEN SATURATION: 97 % | DIASTOLIC BLOOD PRESSURE: 62 MMHG | RESPIRATION RATE: 16 BRPM | TEMPERATURE: 98.6 F | SYSTOLIC BLOOD PRESSURE: 114 MMHG | WEIGHT: 216.05 LBS | HEART RATE: 71 BPM | BODY MASS INDEX: 30.93 KG/M2 | HEIGHT: 70 IN

## 2022-11-22 DIAGNOSIS — Z11.59 NEED FOR HEPATITIS C SCREENING TEST: ICD-10-CM

## 2022-11-22 DIAGNOSIS — E11.69 HYPERLIPIDEMIA ASSOCIATED WITH TYPE 2 DIABETES MELLITUS (HCC): ICD-10-CM

## 2022-11-22 DIAGNOSIS — D50.8 OTHER IRON DEFICIENCY ANEMIA: ICD-10-CM

## 2022-11-22 DIAGNOSIS — F41.9 ANXIETY AND DEPRESSION: ICD-10-CM

## 2022-11-22 DIAGNOSIS — F32.A ANXIETY AND DEPRESSION: ICD-10-CM

## 2022-11-22 DIAGNOSIS — E66.9 OBESITY (BMI 30-39.9): ICD-10-CM

## 2022-11-22 DIAGNOSIS — K21.9 GASTROESOPHAGEAL REFLUX DISEASE WITHOUT ESOPHAGITIS: ICD-10-CM

## 2022-11-22 DIAGNOSIS — E11.9 TYPE 2 DIABETES MELLITUS WITHOUT COMPLICATION, WITHOUT LONG-TERM CURRENT USE OF INSULIN (HCC): ICD-10-CM

## 2022-11-22 DIAGNOSIS — Z00.00 PREVENTATIVE HEALTH CARE: ICD-10-CM

## 2022-11-22 DIAGNOSIS — E78.5 HYPERLIPIDEMIA ASSOCIATED WITH TYPE 2 DIABETES MELLITUS (HCC): ICD-10-CM

## 2022-11-22 LAB
HBA1C MFR BLD: 5.9 % (ref 0–5.6)
INT CON NEG: NEGATIVE
INT CON POS: POSITIVE

## 2022-11-22 PROCEDURE — 99214 OFFICE O/P EST MOD 30 MIN: CPT | Performed by: STUDENT IN AN ORGANIZED HEALTH CARE EDUCATION/TRAINING PROGRAM

## 2022-11-22 PROCEDURE — 83036 HEMOGLOBIN GLYCOSYLATED A1C: CPT | Performed by: STUDENT IN AN ORGANIZED HEALTH CARE EDUCATION/TRAINING PROGRAM

## 2022-11-22 ASSESSMENT — FIBROSIS 4 INDEX: FIB4 SCORE: 0.59

## 2022-12-21 DIAGNOSIS — E78.1 HIGH TRIGLYCERIDES: ICD-10-CM

## 2022-12-21 RX ORDER — FENOFIBRATE 145 MG/1
145 TABLET, COATED ORAL DAILY
Qty: 90 TABLET | Refills: 3 | Status: SHIPPED | OUTPATIENT
Start: 2022-12-21 | End: 2023-03-21

## 2023-03-13 DIAGNOSIS — F41.9 ANXIETY AND DEPRESSION: ICD-10-CM

## 2023-03-13 DIAGNOSIS — F32.A ANXIETY AND DEPRESSION: ICD-10-CM

## 2023-03-13 RX ORDER — ESCITALOPRAM OXALATE 20 MG/1
TABLET ORAL
Qty: 90 TABLET | Refills: 3 | Status: SHIPPED | OUTPATIENT
Start: 2023-03-13 | End: 2024-03-11

## 2023-03-18 DIAGNOSIS — E11.9 TYPE 2 DIABETES MELLITUS WITHOUT COMPLICATION, WITHOUT LONG-TERM CURRENT USE OF INSULIN (HCC): ICD-10-CM

## 2023-05-07 DIAGNOSIS — F32.A ANXIETY AND DEPRESSION: ICD-10-CM

## 2023-05-07 DIAGNOSIS — F41.9 ANXIETY AND DEPRESSION: ICD-10-CM

## 2023-05-07 RX ORDER — NADOLOL 20 MG/1
TABLET ORAL
Qty: 90 TABLET | Refills: 3 | Status: SHIPPED | OUTPATIENT
Start: 2023-05-07

## 2023-05-08 DIAGNOSIS — E78.1 HIGH TRIGLYCERIDES: ICD-10-CM

## 2023-05-08 DIAGNOSIS — E11.9 TYPE 2 DIABETES MELLITUS WITHOUT COMPLICATION, WITHOUT LONG-TERM CURRENT USE OF INSULIN (HCC): ICD-10-CM

## 2023-05-08 RX ORDER — ATORVASTATIN CALCIUM 10 MG/1
TABLET, FILM COATED ORAL
Qty: 90 TABLET | Refills: 3 | Status: SHIPPED | OUTPATIENT
Start: 2023-05-08

## 2023-05-20 ENCOUNTER — HOSPITAL ENCOUNTER (OUTPATIENT)
Dept: LAB | Facility: MEDICAL CENTER | Age: 48
End: 2023-05-20
Attending: STUDENT IN AN ORGANIZED HEALTH CARE EDUCATION/TRAINING PROGRAM
Payer: COMMERCIAL

## 2023-05-20 DIAGNOSIS — D50.8 OTHER IRON DEFICIENCY ANEMIA: ICD-10-CM

## 2023-05-20 DIAGNOSIS — Z00.00 PREVENTATIVE HEALTH CARE: ICD-10-CM

## 2023-05-20 DIAGNOSIS — E78.5 HYPERLIPIDEMIA ASSOCIATED WITH TYPE 2 DIABETES MELLITUS (HCC): ICD-10-CM

## 2023-05-20 DIAGNOSIS — F41.9 ANXIETY AND DEPRESSION: ICD-10-CM

## 2023-05-20 DIAGNOSIS — E11.9 TYPE 2 DIABETES MELLITUS WITHOUT COMPLICATION, WITHOUT LONG-TERM CURRENT USE OF INSULIN (HCC): ICD-10-CM

## 2023-05-20 DIAGNOSIS — F32.A ANXIETY AND DEPRESSION: ICD-10-CM

## 2023-05-20 DIAGNOSIS — E11.69 HYPERLIPIDEMIA ASSOCIATED WITH TYPE 2 DIABETES MELLITUS (HCC): ICD-10-CM

## 2023-05-20 DIAGNOSIS — Z11.59 NEED FOR HEPATITIS C SCREENING TEST: ICD-10-CM

## 2023-05-20 DIAGNOSIS — E66.9 OBESITY (BMI 30-39.9): ICD-10-CM

## 2023-05-20 LAB
ALBUMIN SERPL BCP-MCNC: 4.5 G/DL (ref 3.2–4.9)
ALBUMIN/GLOB SERPL: 1.8 G/DL
ALP SERPL-CCNC: 59 U/L (ref 30–99)
ALT SERPL-CCNC: 37 U/L (ref 2–50)
ANION GAP SERPL CALC-SCNC: 11 MMOL/L (ref 7–16)
ANISOCYTOSIS BLD QL SMEAR: ABNORMAL
AST SERPL-CCNC: 33 U/L (ref 12–45)
BASOPHILS # BLD AUTO: 1.2 % (ref 0–1.8)
BASOPHILS # BLD: 0.05 K/UL (ref 0–0.12)
BILIRUB SERPL-MCNC: 0.3 MG/DL (ref 0.1–1.5)
BUN SERPL-MCNC: 10 MG/DL (ref 8–22)
BURR CELLS BLD QL SMEAR: NORMAL
CALCIUM ALBUM COR SERPL-MCNC: 8.9 MG/DL (ref 8.5–10.5)
CALCIUM SERPL-MCNC: 9.3 MG/DL (ref 8.5–10.5)
CHLORIDE SERPL-SCNC: 107 MMOL/L (ref 96–112)
CHOLEST SERPL-MCNC: 135 MG/DL (ref 100–199)
CO2 SERPL-SCNC: 24 MMOL/L (ref 20–33)
COMMENT 1642: NORMAL
CREAT SERPL-MCNC: 1.14 MG/DL (ref 0.5–1.4)
EOSINOPHIL # BLD AUTO: 0.19 K/UL (ref 0–0.51)
EOSINOPHIL NFR BLD: 4.4 % (ref 0–6.9)
ERYTHROCYTE [DISTWIDTH] IN BLOOD BY AUTOMATED COUNT: 46.8 FL (ref 35.9–50)
EST. AVERAGE GLUCOSE BLD GHB EST-MCNC: 140 MG/DL
FASTING STATUS PATIENT QL REPORTED: NORMAL
FERRITIN SERPL-MCNC: 16.7 NG/ML (ref 22–322)
GFR SERPLBLD CREATININE-BSD FMLA CKD-EPI: 81 ML/MIN/1.73 M 2
GLOBULIN SER CALC-MCNC: 2.5 G/DL (ref 1.9–3.5)
GLUCOSE SERPL-MCNC: 128 MG/DL (ref 65–99)
HBA1C MFR BLD: 6.5 % (ref 4–5.6)
HCT VFR BLD AUTO: 43.3 % (ref 42–52)
HCV AB SER QL: NORMAL
HDLC SERPL-MCNC: 30 MG/DL
HGB BLD-MCNC: 12.8 G/DL (ref 14–18)
IMM GRANULOCYTES # BLD AUTO: 0.01 K/UL (ref 0–0.11)
IMM GRANULOCYTES NFR BLD AUTO: 0.2 % (ref 0–0.9)
IRON SATN MFR SERPL: 6 % (ref 15–55)
IRON SERPL-MCNC: 27 UG/DL (ref 50–180)
LDLC SERPL CALC-MCNC: 80 MG/DL
LYMPHOCYTES # BLD AUTO: 1.57 K/UL (ref 1–4.8)
LYMPHOCYTES NFR BLD: 36.8 % (ref 22–41)
MCH RBC QN AUTO: 23.1 PG (ref 27–33)
MCHC RBC AUTO-ENTMCNC: 29.6 G/DL (ref 33.7–35.3)
MCV RBC AUTO: 78.3 FL (ref 81.4–97.8)
MICROCYTES BLD QL SMEAR: ABNORMAL
MONOCYTES # BLD AUTO: 0.41 K/UL (ref 0–0.85)
MONOCYTES NFR BLD AUTO: 9.6 % (ref 0–13.4)
MORPHOLOGY BLD-IMP: NORMAL
NEUTROPHILS # BLD AUTO: 2.04 K/UL (ref 1.82–7.42)
NEUTROPHILS NFR BLD: 47.8 % (ref 44–72)
NRBC # BLD AUTO: 0 K/UL
NRBC BLD-RTO: 0 /100 WBC
OVALOCYTES BLD QL SMEAR: NORMAL
PLATELET # BLD AUTO: 290 K/UL (ref 164–446)
PLATELET BLD QL SMEAR: NORMAL
PMV BLD AUTO: 11.1 FL (ref 9–12.9)
POIKILOCYTOSIS BLD QL SMEAR: NORMAL
POTASSIUM SERPL-SCNC: 4 MMOL/L (ref 3.6–5.5)
PROT SERPL-MCNC: 7 G/DL (ref 6–8.2)
RBC # BLD AUTO: 5.53 M/UL (ref 4.7–6.1)
RBC BLD AUTO: PRESENT
SODIUM SERPL-SCNC: 142 MMOL/L (ref 135–145)
TIBC SERPL-MCNC: 464 UG/DL (ref 250–450)
TRIGL SERPL-MCNC: 127 MG/DL (ref 0–149)
TSH SERPL DL<=0.005 MIU/L-ACNC: 1.11 UIU/ML (ref 0.38–5.33)
UIBC SERPL-MCNC: 437 UG/DL (ref 110–370)
WBC # BLD AUTO: 4.3 K/UL (ref 4.8–10.8)

## 2023-05-20 PROCEDURE — 83550 IRON BINDING TEST: CPT

## 2023-05-20 PROCEDURE — 84443 ASSAY THYROID STIM HORMONE: CPT

## 2023-05-20 PROCEDURE — 82728 ASSAY OF FERRITIN: CPT

## 2023-05-20 PROCEDURE — 36415 COLL VENOUS BLD VENIPUNCTURE: CPT

## 2023-05-20 PROCEDURE — 80061 LIPID PANEL: CPT

## 2023-05-20 PROCEDURE — 83036 HEMOGLOBIN GLYCOSYLATED A1C: CPT

## 2023-05-20 PROCEDURE — 85025 COMPLETE CBC W/AUTO DIFF WBC: CPT

## 2023-05-20 PROCEDURE — 86803 HEPATITIS C AB TEST: CPT

## 2023-05-20 PROCEDURE — 80053 COMPREHEN METABOLIC PANEL: CPT

## 2023-05-20 PROCEDURE — 83540 ASSAY OF IRON: CPT

## 2023-05-22 NOTE — PROGRESS NOTES
Subjective:     CC: chronic conditions follow up    HPI:   Fredis presents today for chronic conditions follow up    Problem   Iron Deficiency Anemia    Chronic condition. Since last visit, he has seen GI and EGD/colonoscopy were overall normal. He is planned for capsule endoscopy but has not completed yet. This may be due to his chronic hemorrhoids.    He reports has been having intermittent rectal bleeding for the past year. He does feel fatigue sometimes. He is pending appointment with GI.       Hemorrhoid    Chronic condition. He reports diagnosed with internal and external hemorrhoids 20+ years ago by general practitioner and has been having on and off bleeding with defecation. He does feel fatigue sometimes. He is followed by GI.       Type 2 diabetes mellitus without complication, without long-term current use of insulin (HCC)    Chronic condition. Onset around 02/2021.    Current medication regimen: metformin 1000mg BID  Patient tolerating and reports compliant with medications. Does not need refill of medications today. Denies vision changes, dry mouth, chest pain, nausea/vomiting/diarrhea, polydipsia, polyphagia, polyuria, hypoglycemia, numbness/tingling. He checks his feet at home.  Last eye exam: done 04/2022  Last foot exam: done 02/2022  Diet: tries to eat healthy in general  Exercise: works out 2x/week  Vaccines: flu received 10/2021, Tdap received 10/2018, PPSV23 received 02/2022     Obesity (Bmi 30-39.9)    Chronic condition. Normal weight about 220lb. He tries to eat healthy in general. He does regularly exercise with work out 2-3x/week.       Hyperlipidemia associated with type 2 diabetes mellitus (HCC)    Chronic condition.  Current regimen: atorvastatin 10mg qhs, fenofibrate 145mg daily   He reports compliance and tolerating medication well. Denies musculoskeletal pain, headache, diarrhea. He does not need medication refill today. No acute concerns at this time.       Anxiety and Depression     "Chronic condition.  Current regimen: escitalopram 20mg daily, nadolol 20mg daily  He reports compliance and/or tolerance and symptoms controlled with current medication(s). Does not need medication(s) refill. No acute concerns.       Gerd (Gastroesophageal Reflux Disease)    Chronic condition.    Current regimen: OTC omeprazole 20mg daily  He reports compliance and/or tolerance and symptoms controlled with current medication(s). Does not need medication(s) refill. No acute concerns.           Current Outpatient Medications Ordered in Epic   Medication Sig Dispense Refill    atorvastatin (LIPITOR) 10 MG Tab TAKE ONE TABLET BY MOUTH EVERY EVENING 90 Tablet 3    fenofibrate (TRICOR) 145 MG Tab       nadolol (CORGARD) 20 MG Tab TAKE ONE TABLET BY MOUTH DAILY 90 Tablet 3    metFORMIN (GLUCOPHAGE) 500 MG Tab TAKE TWO TABLETS BY MOUTH TWICE A DAY WITH MEALS 360 Tablet 3    escitalopram (LEXAPRO) 20 MG tablet TAKE ONE TABLET BY MOUTH DAILY 90 Tablet 3    vitamin D (CHOLECALCIFEROL) 1000 UNIT Tab Take 1,000 Units by mouth every day.      omeprazole (PRILOSEC) 20 MG delayed-release capsule Take 1 Cap by mouth every day. 30 Cap 0     No current Epic-ordered facility-administered medications on file.     Social history  Living situation: lives with family and 4 children  Occupation: works as  and warehouse  Marital status:   Alcohol/tobacco/illicit drugs: former smoker 1.5 ppd x 10 years, quit 1998, denies EtOH or illicit drugs     Health Maintenance: reviewed and discussed with patient  Vaccines: due for flu, Tdap received 10/2018, PPSV23 02/2022, Pfizer COVID #2 received 05/2021 (planned for booster vaccine 2/18/2022)    ROS:  See HPI    Objective:     Exam:  /64   Pulse 71   Temp 36.2 °C (97.2 °F) (Temporal)   Resp 16   Ht 1.778 m (5' 10\")   Wt 98 kg (216 lb 0.8 oz)   SpO2 96%   BMI 31.00 kg/m²  Body mass index is 31 kg/m².    Gen: Alert and oriented, No apparent distress.  Neck: Neck is " supple without lymphadenopathy.  Lungs: Normal effort, CTA bilaterally, no wheezes, rhonchi, or rales  CV: Regular rate and rhythm. No murmurs, rubs, or gallops.  Ext: No clubbing, cyanosis, edema.    Monofilament testing with a 10 gram force: sensation intact: intact bilaterally  Visual Inspection: Feet without maceration, ulcers, fissures.  Pedal pulses: intact bilaterally    Labs:   Lab Results   Component Value Date/Time    WBC 4.3 (L) 05/20/2023 07:19 AM    RBC 5.53 05/20/2023 07:19 AM    HEMOGLOBIN 12.8 (L) 05/20/2023 07:19 AM    HEMATOCRIT 43.3 05/20/2023 07:19 AM    MCV 78.3 (L) 05/20/2023 07:19 AM    MCH 23.1 (L) 05/20/2023 07:19 AM    MCHC 29.6 (L) 05/20/2023 07:19 AM    RDW 46.8 05/20/2023 07:19 AM    PLATELETCT 290 05/20/2023 07:19 AM    MPV 11.1 05/20/2023 07:19 AM      Lab Results   Component Value Date/Time    SODIUM 142 05/20/2023 07:19 AM    POTASSIUM 4.0 05/20/2023 07:19 AM    CHLORIDE 107 05/20/2023 07:19 AM    CO2 24 05/20/2023 07:19 AM    ANION 11.0 05/20/2023 07:19 AM    GLUCOSE 128 (H) 05/20/2023 07:19 AM    BUN 10 05/20/2023 07:19 AM    CREATININE 1.14 05/20/2023 07:19 AM    CALCIUM 9.3 05/20/2023 07:19 AM    ASTSGOT 33 05/20/2023 07:19 AM    ALTSGPT 37 05/20/2023 07:19 AM    TBILIRUBIN 0.3 05/20/2023 07:19 AM    ALBUMIN 4.5 05/20/2023 07:19 AM    TOTPROTEIN 7.0 05/20/2023 07:19 AM    GLOBULIN 2.5 05/20/2023 07:19 AM    AGRATIO 1.8 05/20/2023 07:19 AM     Lab Results   Component Value Date/Time    HBA1C 6.5 (H) 05/20/2023 07:19 AM      Lab Results   Component Value Date/Time    CHOLSTRLTOT 135 05/20/2023 0719    TRIGLYCERIDE 127 05/20/2023 0719    HDL 30 (A) 05/20/2023 0719    LDL 80 05/20/2023 0719     Lab Results   Component Value Date/Time    TSHULTRASEN 1.110 05/20/2023 0719     Iron 27, % saturation 6, ferritin 16.7    Assessment & Plan:     47 y.o. male with the following -     1. Type 2 diabetes mellitus without complication, without long-term current use of insulin (HCC)  Chronic  condition, controlled with medications. Most recent A1C 6.5.  - cont current medication: metformin 1000mg BID  - discussed to consider adding another agent for both diabetes and weight loss, patient politely declined and would like to work on lifestyle management at this time  - Discussed daily self foot exam, eye care, and regular exercise with patient  - Patient has appointment with eye provider next month  - Patient instructed to follow a low carbohydrate diet  - Follow up in 3 months or earlier as needed  - Diabetic Monofilament LE Exam  - HEMOGLOBIN A1C; Future  - MICROALBUMIN CREAT RATIO URINE; Future    2. Hyperlipidemia associated with type 2 diabetes mellitus (HCC)  Chronic condition, stable.  - cont current regimen: atorvastatin 10mg daily, fenofibrate 145mg daily  - Lipid Profile; Future  - TSH WITH REFLEX TO FT4; Future    3. Other iron deficiency anemia  Chronic condition, stable. Had EGD/colonoscopy done 11/2022 which did not show any source of bleeding. Had plans for capsule endoscopy as next step but has not completed yet and was told by GI not urgent to do for now. Still low on iron. Has been taking iron supplements every other day and tolerating well.  - advised to increase iron supplement every other day to daily  - advised to follow up with GI to consider hemorrhoid repair as needed  - CBC WITH DIFFERENTIAL; Future  - IRON/TOTAL IRON BIND; Future  - FERRITIN; Future    4. Hemorrhoids, unspecified hemorrhoid type  Chronic condition, stable.  - advised to follow up with GI to consider repair as needed  - cont fiber supplements    5. Anxiety and depression  Chronic condition, stable.  - cont current regimen: escitalopram 20mg daily, nadolol 20mg daily    6. Gastroesophageal reflux disease without esophagitis  Chronic condition, stable.  - cont current regimen: omeprazole 20mg daily    7. Obesity (BMI 30-39.9)  - Comp Metabolic Panel; Future  - HEMOGLOBIN A1C; Future  - Lipid Profile; Future  - TSH  WITH REFLEX TO FT4; Future  - Patient identified as having weight management issue.  Appropriate orders and counseling given.      Return in about 6 months (around 11/23/2023) for chronic medical conditions.    Please note that this dictation was created using voice recognition software. I have made every reasonable attempt to correct obvious errors, but I expect that there are errors of grammar and possibly content that I did not discover before finalizing the note.

## 2023-05-23 ENCOUNTER — OFFICE VISIT (OUTPATIENT)
Dept: MEDICAL GROUP | Facility: MEDICAL CENTER | Age: 48
End: 2023-05-23
Payer: COMMERCIAL

## 2023-05-23 VITALS
BODY MASS INDEX: 30.93 KG/M2 | HEART RATE: 71 BPM | WEIGHT: 216.05 LBS | SYSTOLIC BLOOD PRESSURE: 108 MMHG | DIASTOLIC BLOOD PRESSURE: 64 MMHG | RESPIRATION RATE: 16 BRPM | OXYGEN SATURATION: 96 % | TEMPERATURE: 97.2 F | HEIGHT: 70 IN

## 2023-05-23 DIAGNOSIS — F41.9 ANXIETY AND DEPRESSION: ICD-10-CM

## 2023-05-23 DIAGNOSIS — K21.9 GASTROESOPHAGEAL REFLUX DISEASE WITHOUT ESOPHAGITIS: ICD-10-CM

## 2023-05-23 DIAGNOSIS — E78.5 HYPERLIPIDEMIA ASSOCIATED WITH TYPE 2 DIABETES MELLITUS (HCC): ICD-10-CM

## 2023-05-23 DIAGNOSIS — E11.69 HYPERLIPIDEMIA ASSOCIATED WITH TYPE 2 DIABETES MELLITUS (HCC): ICD-10-CM

## 2023-05-23 DIAGNOSIS — D50.8 OTHER IRON DEFICIENCY ANEMIA: ICD-10-CM

## 2023-05-23 DIAGNOSIS — E11.9 TYPE 2 DIABETES MELLITUS WITHOUT COMPLICATION, WITHOUT LONG-TERM CURRENT USE OF INSULIN (HCC): ICD-10-CM

## 2023-05-23 DIAGNOSIS — F32.A ANXIETY AND DEPRESSION: ICD-10-CM

## 2023-05-23 DIAGNOSIS — E66.9 OBESITY (BMI 30-39.9): ICD-10-CM

## 2023-05-23 DIAGNOSIS — K64.9 HEMORRHOIDS, UNSPECIFIED HEMORRHOID TYPE: ICD-10-CM

## 2023-05-23 PROCEDURE — 99214 OFFICE O/P EST MOD 30 MIN: CPT | Performed by: STUDENT IN AN ORGANIZED HEALTH CARE EDUCATION/TRAINING PROGRAM

## 2023-05-23 PROCEDURE — 3078F DIAST BP <80 MM HG: CPT | Performed by: STUDENT IN AN ORGANIZED HEALTH CARE EDUCATION/TRAINING PROGRAM

## 2023-05-23 PROCEDURE — 3074F SYST BP LT 130 MM HG: CPT | Performed by: STUDENT IN AN ORGANIZED HEALTH CARE EDUCATION/TRAINING PROGRAM

## 2023-05-23 RX ORDER — FENOFIBRATE 145 MG/1
TABLET, COATED ORAL
COMMUNITY
Start: 2023-03-24 | End: 2023-12-19

## 2023-05-23 ASSESSMENT — FIBROSIS 4 INDEX: FIB4 SCORE: 0.84

## 2023-05-23 ASSESSMENT — PATIENT HEALTH QUESTIONNAIRE - PHQ9: CLINICAL INTERPRETATION OF PHQ2 SCORE: 0

## 2023-08-10 ENCOUNTER — OFFICE VISIT (OUTPATIENT)
Dept: URGENT CARE | Facility: PHYSICIAN GROUP | Age: 48
End: 2023-08-10
Payer: COMMERCIAL

## 2023-08-10 VITALS
BODY MASS INDEX: 30.92 KG/M2 | OXYGEN SATURATION: 98 % | HEART RATE: 78 BPM | DIASTOLIC BLOOD PRESSURE: 62 MMHG | TEMPERATURE: 97.4 F | SYSTOLIC BLOOD PRESSURE: 110 MMHG | RESPIRATION RATE: 16 BRPM | WEIGHT: 216 LBS | HEIGHT: 70 IN

## 2023-08-10 DIAGNOSIS — E11.9 TYPE 2 DIABETES MELLITUS WITHOUT COMPLICATION, WITHOUT LONG-TERM CURRENT USE OF INSULIN (HCC): ICD-10-CM

## 2023-08-10 DIAGNOSIS — H65.03 BILATERAL ACUTE SEROUS OTITIS MEDIA, RECURRENCE NOT SPECIFIED: ICD-10-CM

## 2023-08-10 PROCEDURE — 3078F DIAST BP <80 MM HG: CPT | Performed by: FAMILY MEDICINE

## 2023-08-10 PROCEDURE — 99213 OFFICE O/P EST LOW 20 MIN: CPT | Performed by: FAMILY MEDICINE

## 2023-08-10 PROCEDURE — 3074F SYST BP LT 130 MM HG: CPT | Performed by: FAMILY MEDICINE

## 2023-08-10 RX ORDER — DEXAMETHASONE 6 MG/1
6 TABLET ORAL DAILY
Qty: 3 TABLET | Refills: 0 | Status: SHIPPED | OUTPATIENT
Start: 2023-08-10 | End: 2023-11-13

## 2023-08-10 ASSESSMENT — FIBROSIS 4 INDEX: FIB4 SCORE: 0.84

## 2023-08-11 NOTE — PROGRESS NOTES
Subjective     Fredis Perry is a 47 y.o. male who presents with Ear Fullness (X 2 days)      - This is a pleasant and nontoxic appearing 47 y.o. person who has come to the walk-in clinic today for:    #1) 2 days ears feel full and make crackling sounds. No trauma or dc/bleeding from ears    Hx DM, says sugars run well at home       ALLERGIES:  Patient has no known allergies.     PMH:  Past Medical History:   Diagnosis Date    Anxiety     Depression     GERD (gastroesophageal reflux disease)     High cholesterol     Hyperlipidemia     Hypertension     Indigestion         PSH:  Past Surgical History:   Procedure Laterality Date    VENTRAL HERNIA REPAIR N/A 9/1/2020    Procedure: REPAIR, HERNIA, VENTRAL-INCARCERATED, POSSIBLE MESH;  Surgeon: Yahir Cunningham M.D.;  Location: SURGERY SAME DAY [facility] [place];  Service: General    TONSILLECTOMY         MEDS:    Current Outpatient Medications:     dexamethasone (DECADRON) 6 MG Tab, Take 1 Tablet by mouth every day., Disp: 3 Tablet, Rfl: 0    fenofibrate (TRICOR) 145 MG Tab, , Disp: , Rfl:     atorvastatin (LIPITOR) 10 MG Tab, TAKE ONE TABLET BY MOUTH EVERY EVENING, Disp: 90 Tablet, Rfl: 3    nadolol (CORGARD) 20 MG Tab, TAKE ONE TABLET BY MOUTH DAILY, Disp: 90 Tablet, Rfl: 3    metFORMIN (GLUCOPHAGE) 500 MG Tab, TAKE TWO TABLETS BY MOUTH TWICE A DAY WITH MEALS, Disp: 360 Tablet, Rfl: 3    escitalopram (LEXAPRO) 20 MG tablet, TAKE ONE TABLET BY MOUTH DAILY, Disp: 90 Tablet, Rfl: 3    vitamin D (CHOLECALCIFEROL) 1000 UNIT Tab, Take 1,000 Units by mouth every day., Disp: , Rfl:     omeprazole (PRILOSEC) 20 MG delayed-release capsule, Take 1 Cap by mouth every day., Disp: 30 Cap, Rfl: 0    ** I have documented what I find to be significant in regards to past medical, social, family and surgical history  in my HPI or under PMH/PSH/FH review section, otherwise it is noncontributory **         HPI    Review of Systems   HENT:  Positive for ear pain.    All other systems reviewed  "and are negative.             Objective     /62 (BP Location: Left arm, Patient Position: Sitting, BP Cuff Size: Large adult)   Pulse 78   Temp 36.3 °C (97.4 °F) (Temporal)   Resp 16   Ht 1.778 m (5' 10\")   Wt 98 kg (216 lb)   SpO2 98%   BMI 30.99 kg/m²      Physical Exam  Vitals and nursing note reviewed.   Constitutional:       General: He is not in acute distress.     Appearance: Normal appearance. He is well-developed.   HENT:      Head: Normocephalic.      Right Ear: Ear canal and external ear normal.      Left Ear: Ear canal and external ear normal.      Ears:      Comments: Ears: TM normal color but bulging and posterior serous like fluid noted.   Pulmonary:      Effort: Pulmonary effort is normal. No respiratory distress.   Neurological:      Mental Status: He is alert.      Motor: No abnormal muscle tone.   Psychiatric:         Mood and Affect: Mood normal.         Behavior: Behavior normal.                             Assessment & Plan       1. Bilateral acute serous otitis media, recurrence not specified  dexamethasone (DECADRON) 6 MG Tab      2. Type 2 diabetes mellitus without complication, without long-term current use of insulin (HCC)            - Dx, plan & d/c instructions discussed   - OTC Flonase x 4 weeks and Sudafed as needed      Follow up with your regular primary care providers office for a recheck on today's visit. ER if not improving in 2-3 days or if feeling/getting worse.    Patient left in stable condition     Pertinent prior office visit notes in [place] have been reviewed by me today on day of this visit.       "

## 2023-09-20 ENCOUNTER — DOCUMENTATION (OUTPATIENT)
Dept: HEALTH INFORMATION MANAGEMENT | Facility: OTHER | Age: 48
End: 2023-09-20
Payer: COMMERCIAL

## 2023-09-26 ENCOUNTER — OFFICE VISIT (OUTPATIENT)
Dept: URGENT CARE | Facility: PHYSICIAN GROUP | Age: 48
End: 2023-09-26
Payer: COMMERCIAL

## 2023-09-26 VITALS
SYSTOLIC BLOOD PRESSURE: 112 MMHG | RESPIRATION RATE: 14 BRPM | HEART RATE: 60 BPM | WEIGHT: 215 LBS | HEIGHT: 70 IN | DIASTOLIC BLOOD PRESSURE: 64 MMHG | TEMPERATURE: 97.6 F | OXYGEN SATURATION: 99 % | BODY MASS INDEX: 30.78 KG/M2

## 2023-09-26 DIAGNOSIS — L73.9 FOLLICULITIS: ICD-10-CM

## 2023-09-26 PROCEDURE — 3078F DIAST BP <80 MM HG: CPT | Performed by: FAMILY MEDICINE

## 2023-09-26 PROCEDURE — 99213 OFFICE O/P EST LOW 20 MIN: CPT | Performed by: FAMILY MEDICINE

## 2023-09-26 PROCEDURE — 3074F SYST BP LT 130 MM HG: CPT | Performed by: FAMILY MEDICINE

## 2023-09-26 RX ORDER — SULFAMETHOXAZOLE AND TRIMETHOPRIM 800; 160 MG/1; MG/1
1 TABLET ORAL 2 TIMES DAILY
Qty: 10 TABLET | Refills: 0 | Status: SHIPPED | OUTPATIENT
Start: 2023-09-26 | End: 2023-10-01

## 2023-09-26 ASSESSMENT — ENCOUNTER SYMPTOMS
COUGH: 0
DIZZINESS: 0
VOMITING: 0
FEVER: 0
CHILLS: 0
MYALGIAS: 0
NAUSEA: 0
SHORTNESS OF BREATH: 0
SORE THROAT: 0

## 2023-09-26 ASSESSMENT — FIBROSIS 4 INDEX: FIB4 SCORE: 0.84

## 2023-09-27 NOTE — PROGRESS NOTES
Subjective:   Fredis Perry is a 47 y.o. male who presents for Wound Infection (X3-4 days: Accidentally scratched skin tags on L side of abdomen/armpit area, thinks they may be infected.)        Rash  Chronicity: Reports left axilla erythema, localized swelling periphery of skin tags from recent scratching of the area. Episode onset: 3 days. The problem is unchanged. Location: Left axilla. The rash is characterized by redness and swelling. Associated with: Finger scratch of the area. Pertinent negatives include no cough, fever, shortness of breath, sore throat or vomiting. Treatments tried: Routine skin hygiene. The treatment provided no relief.     PMH:  has a past medical history of Anxiety, Depression, GERD (gastroesophageal reflux disease), High cholesterol, Hyperlipidemia, Hypertension, and Indigestion.  MEDS:   Current Outpatient Medications:     Ferrous Sulfate (IRON PO), Take  by mouth., Disp: , Rfl:     sulfamethoxazole-trimethoprim (BACTRIM DS) 800-160 MG tablet, Take 1 Tablet by mouth 2 times a day for 5 days., Disp: 10 Tablet, Rfl: 0    mupirocin (BACTROBAN) 2 % Ointment, Apply 1 Application topically 2 times a day., Disp: 22 g, Rfl: 0    fenofibrate (TRICOR) 145 MG Tab, , Disp: , Rfl:     atorvastatin (LIPITOR) 10 MG Tab, TAKE ONE TABLET BY MOUTH EVERY EVENING, Disp: 90 Tablet, Rfl: 3    nadolol (CORGARD) 20 MG Tab, TAKE ONE TABLET BY MOUTH DAILY, Disp: 90 Tablet, Rfl: 3    metFORMIN (GLUCOPHAGE) 500 MG Tab, TAKE TWO TABLETS BY MOUTH TWICE A DAY WITH MEALS, Disp: 360 Tablet, Rfl: 3    escitalopram (LEXAPRO) 20 MG tablet, TAKE ONE TABLET BY MOUTH DAILY, Disp: 90 Tablet, Rfl: 3    vitamin D (CHOLECALCIFEROL) 1000 UNIT Tab, Take 1,000 Units by mouth every day., Disp: , Rfl:     omeprazole (PRILOSEC) 20 MG delayed-release capsule, Take 1 Cap by mouth every day., Disp: 30 Cap, Rfl: 0    dexamethasone (DECADRON) 6 MG Tab, Take 1 Tablet by mouth every day. (Patient not taking: Reported on 9/26/2023),  "Disp: 3 Tablet, Rfl: 0  ALLERGIES: No Known Allergies  SURGHX:   Past Surgical History:   Procedure Laterality Date    VENTRAL HERNIA REPAIR N/A 9/1/2020    Procedure: REPAIR, HERNIA, VENTRAL-INCARCERATED, POSSIBLE MESH;  Surgeon: Yahir Cunningham M.D.;  Location: SURGERY SAME DAY [facility];  Service: General    TONSILLECTOMY       SOCHX:  reports that he has never smoked. He has never used smokeless tobacco. He reports that he does not drink alcohol and does not use drugs.  FH:   Family History   Problem Relation Age of Onset    Psychiatric Illness Mother     Heart Disease Father     Hypertension Father     Hyperlipidemia Father     Alcohol/Drug Father     Diabetes Maternal Grandmother     Heart Disease Maternal Grandfather     Stroke Maternal Grandfather      Review of Systems   Constitutional:  Negative for chills and fever.   HENT:  Negative for sore throat.    Respiratory:  Negative for cough and shortness of breath.    Gastrointestinal:  Negative for nausea and vomiting.   Musculoskeletal:  Negative for myalgias.   Skin:  Positive for rash.   Neurological:  Negative for dizziness.        Objective:   /64   Pulse 60   Temp 36.4 °C (97.6 °F)   Resp 14   Ht 1.778 m (5' 10\")   Wt 97.5 kg (215 lb)   SpO2 99%   BMI 30.85 kg/m²   Physical Exam  Vitals and nursing note reviewed.   Constitutional:       General: He is not in acute distress.     Appearance: He is well-developed.   HENT:      Head: Normocephalic and atraumatic.      Right Ear: External ear normal.      Left Ear: External ear normal.      Nose: Nose normal.      Mouth/Throat:      Mouth: Mucous membranes are moist.   Eyes:      Conjunctiva/sclera: Conjunctivae normal.   Cardiovascular:      Rate and Rhythm: Normal rate.   Pulmonary:      Effort: Pulmonary effort is normal. No respiratory distress.      Breath sounds: Normal breath sounds.   Abdominal:      General: There is no distension.   Musculoskeletal:         General: Normal range of " motion.   Skin:     General: Skin is warm and dry.          Neurological:      General: No focal deficit present.      Mental Status: He is alert and oriented to person, place, and time. Mental status is at baseline.      Gait: Gait (gait at baseline) normal.   Psychiatric:         Judgment: Judgment normal.           Assessment/Plan:   1. Folliculitis  - sulfamethoxazole-trimethoprim (BACTRIM DS) 800-160 MG tablet; Take 1 Tablet by mouth 2 times a day for 5 days.  Dispense: 10 Tablet; Refill: 0  - mupirocin (BACTROBAN) 2 % Ointment; Apply 1 Application topically 2 times a day.  Dispense: 22 g; Refill: 0    Other orders  - Ferrous Sulfate (IRON PO); Take  by mouth.        Medical Decision Making/Course:  In the course of preparing for this visit with review of the pertinent past medical history, recent and past clinic visits, current medications, and performing chart, immunization, medical history and medication reconciliation, and in the further course of obtaining the current history pertinent to the clinic visit today, performing an exam and evaluation, ordering and independently evaluating labs, tests  , and/or procedures, prescribing any recommended new medications as noted above, providing any pertinent counseling and education and recommending further coordination of care including recommendations for symptomatic and supportive measures, at least  12 minutes of total time were spent during this encounter.      Discussed close monitoring, return precautions, and supportive measures of maintaining adequate fluid hydration and caloric intake, relative rest and symptom management as needed for pain and/or fever.    Differential diagnosis, natural history, supportive care, and indications for immediate follow-up discussed.     Advised the patient to follow-up with the primary care physician for recheck, reevaluation, and consideration of further management.    Please note that this dictation was created using  voice recognition software. I have worked with consultants from the vendor as well as technical experts from [facility] to optimize the interface. I have made every reasonable attempt to correct obvious errors, but I expect that there are errors of grammar and possibly content that I did not discover before finalizing the note.

## 2023-10-19 ENCOUNTER — OFFICE VISIT (OUTPATIENT)
Dept: URGENT CARE | Facility: PHYSICIAN GROUP | Age: 48
End: 2023-10-19
Payer: COMMERCIAL

## 2023-10-19 VITALS
SYSTOLIC BLOOD PRESSURE: 112 MMHG | WEIGHT: 213 LBS | RESPIRATION RATE: 12 BRPM | TEMPERATURE: 96.3 F | HEART RATE: 63 BPM | OXYGEN SATURATION: 97 % | DIASTOLIC BLOOD PRESSURE: 66 MMHG | BODY MASS INDEX: 28.85 KG/M2 | HEIGHT: 72 IN

## 2023-10-19 DIAGNOSIS — R05.1 ACUTE COUGH: ICD-10-CM

## 2023-10-19 PROCEDURE — 3074F SYST BP LT 130 MM HG: CPT | Performed by: NURSE PRACTITIONER

## 2023-10-19 PROCEDURE — 99213 OFFICE O/P EST LOW 20 MIN: CPT | Performed by: NURSE PRACTITIONER

## 2023-10-19 PROCEDURE — 3078F DIAST BP <80 MM HG: CPT | Performed by: NURSE PRACTITIONER

## 2023-10-19 RX ORDER — BENZONATATE 100 MG/1
100 CAPSULE ORAL 3 TIMES DAILY PRN
Qty: 60 CAPSULE | Refills: 0 | Status: SHIPPED | OUTPATIENT
Start: 2023-10-19 | End: 2023-11-27

## 2023-10-19 RX ORDER — DEXTROMETHORPHAN HYDROBROMIDE AND PROMETHAZINE HYDROCHLORIDE 15; 6.25 MG/5ML; MG/5ML
5 SYRUP ORAL EVERY 4 HOURS PRN
Qty: 120 ML | Refills: 0 | Status: SHIPPED | OUTPATIENT
Start: 2023-10-19 | End: 2023-11-27

## 2023-10-19 ASSESSMENT — FIBROSIS 4 INDEX: FIB4 SCORE: 0.86

## 2023-10-19 NOTE — PROGRESS NOTES
Fredis Perry is a 48 y.o. male who presents for Congestion (X8days Cough, chest congestion and tightness)      HPI  This is a new problem. Fredis Perry is a 48 y.o. patient who presents to urgent care with c/o: Cough congestion, runny nose, chest tightness from coughing.  Symptoms been present for 8 days.  He is not getting worse.  He has done several home COVID test which are negative.  He is not sleeping well secondary to the cough.  He denies wheezing, shortness of breath, chest pain, nausea, vomiting, diaphoresis, diarrhea, body aches, fever, ear pain or sore throat.  Tx tried: OTC medication   ROS See HPI    Allergies:     No Known Allergies    PMSFS Hx:  Past Medical History:   Diagnosis Date    Anxiety     Depression     GERD (gastroesophageal reflux disease)     High cholesterol     Hyperlipidemia     Hypertension     Indigestion      Past Surgical History:   Procedure Laterality Date    VENTRAL HERNIA REPAIR N/A 9/1/2020    Procedure: REPAIR, HERNIA, VENTRAL-INCARCERATED, POSSIBLE MESH;  Surgeon: Yahir Cunningham M.D.;  Location: SURGERY SAME DAY [facility];  Service: General    TONSILLECTOMY       Family History   Problem Relation Age of Onset    Psychiatric Illness Mother     Heart Disease Father     Hypertension Father     Hyperlipidemia Father     Alcohol/Drug Father     Diabetes Maternal Grandmother     Heart Disease Maternal Grandfather     Stroke Maternal Grandfather      Social History     Tobacco Use    Smoking status: Former     Types: Cigarettes    Smokeless tobacco: Never   Substance Use Topics    Alcohol use: No     Alcohol/week: 0.0 oz       Problems:   Patient Active Problem List   Diagnosis    Hyperlipidemia associated with type 2 diabetes mellitus (HCC)    Anxiety and depression    GERD (gastroesophageal reflux disease)    Multiple joint pain    High triglycerides    Type 2 diabetes mellitus without complication, without long-term current use of insulin (HCC)    Obesity (BMI  30-39.9)    History of vasectomy    Incarcerated ventral hernia    Bright red blood per rectum    Preventative health care    Hemorrhoid    Iron deficiency anemia    Right ear impacted cerumen       Medications:   Current Outpatient Medications on File Prior to Visit   Medication Sig Dispense Refill    Ferrous Sulfate (IRON PO) Take  by mouth.      fenofibrate (TRICOR) 145 MG Tab       atorvastatin (LIPITOR) 10 MG Tab TAKE ONE TABLET BY MOUTH EVERY EVENING 90 Tablet 3    nadolol (CORGARD) 20 MG Tab TAKE ONE TABLET BY MOUTH DAILY 90 Tablet 3    metFORMIN (GLUCOPHAGE) 500 MG Tab TAKE TWO TABLETS BY MOUTH TWICE A DAY WITH MEALS 360 Tablet 3    escitalopram (LEXAPRO) 20 MG tablet TAKE ONE TABLET BY MOUTH DAILY 90 Tablet 3    vitamin D (CHOLECALCIFEROL) 1000 UNIT Tab Take 1,000 Units by mouth every day.      omeprazole (PRILOSEC) 20 MG delayed-release capsule Take 1 Cap by mouth every day. 30 Cap 0    mupirocin (BACTROBAN) 2 % Ointment Apply 1 Application topically 2 times a day. 22 g 0    dexamethasone (DECADRON) 6 MG Tab Take 1 Tablet by mouth every day. (Patient not taking: Reported on 9/26/2023) 3 Tablet 0     No current facility-administered medications on file prior to visit.        Objective:     /66   Pulse 63   Temp (!) 35.7 °C (96.3 °F) (Temporal)   Resp 12   Ht 1.829 m (6')   Wt 96.6 kg (213 lb)   SpO2 97%   BMI 28.89 kg/m²     Physical Exam  Nursing note reviewed.   Constitutional:       General: He is not in acute distress.     Appearance: Normal appearance. He is well-developed and well-groomed. He is not ill-appearing or toxic-appearing.   HENT:      Head: Normocephalic and atraumatic.      Right Ear: Tympanic membrane, ear canal and external ear normal.      Left Ear: Tympanic membrane, ear canal and external ear normal.      Nose: Nose normal.      Mouth/Throat:      Mouth: Mucous membranes are moist.      Pharynx: No posterior oropharyngeal erythema.   Eyes:      General:         Right eye:  No discharge.         Left eye: No discharge.      Conjunctiva/sclera: Conjunctivae normal.      Pupils: Pupils are equal, round, and reactive to light.   Cardiovascular:      Rate and Rhythm: Normal rate and regular rhythm.      Pulses: Normal pulses.      Heart sounds: Normal heart sounds.   Pulmonary:      Effort: Pulmonary effort is normal. No accessory muscle usage.      Breath sounds: Normal breath sounds and air entry.   Musculoskeletal:      Cervical back: Neck supple.   Lymphadenopathy:      Cervical: No cervical adenopathy.      Upper Body:      Right upper body: No supraclavicular adenopathy.      Left upper body: No supraclavicular adenopathy.   Skin:     General: Skin is warm and dry.      Capillary Refill: Capillary refill takes less than 2 seconds.   Neurological:      Mental Status: He is alert and oriented to person, place, and time.   Psychiatric:         Mood and Affect: Mood normal.         Behavior: Behavior normal.         Thought Content: Thought content normal.         Judgment: Judgment normal.         Assessment /Associated Orders:      No diagnosis found.    Medical Decision Making:    Fredis is a very pleasant 48 y.o. male who is clinically stable at today's acute urgent care visit.  No acute distress noted.  VSS. Appropriate for outpatient care at this time.   Acute problem today with uncertain prognosis.   Educated in proper administration of  prescription medication(s) ordered today including safety, possible SE, risks, benefits, rationale and alternatives to therapy.   Educated not to drive, drink alcohol, operate machinery, or do anything that requires mental alertness while taking promethazine DM RX medication that has sedation/ drowsiness as a side effect.  Allow an 8-hour wear off time before participating in any of these activities.  Keep well hydrated  OTC antihistamine of choice. Follow manufactures dosing and safety guidelines.    Cool mist humidifier at night prn    Discussed  Dx, management options (risks,benefits, and alternatives to planned treatment), natural progression and supportive care.  Expressed understanding and the treatment plan was agreed upon.   Questions were encouraged and answered   Return to urgent care prn if new or worsening sx or if there is no improvement in condition prn.    Educated in Red flags and indications to immediately call 911 or present to the Emergency Department.         Please note that this dictation was created using voice recognition software. I have worked with consultants from the vendor as well as technical experts from [facility] [employer] to optimize the interface. I have made every reasonable attempt to correct obvious errors, but I expect that there are errors of grammar and possibly content that I did not discover before finalizing the note.  This note was electronically signed by provider

## 2023-10-26 ENCOUNTER — HOSPITAL ENCOUNTER (EMERGENCY)
Facility: MEDICAL CENTER | Age: 48
End: 2023-10-26
Attending: EMERGENCY MEDICINE
Payer: COMMERCIAL

## 2023-10-26 ENCOUNTER — APPOINTMENT (OUTPATIENT)
Dept: RADIOLOGY | Facility: MEDICAL CENTER | Age: 48
End: 2023-10-26
Attending: EMERGENCY MEDICINE
Payer: COMMERCIAL

## 2023-10-26 VITALS
HEART RATE: 68 BPM | WEIGHT: 212.52 LBS | TEMPERATURE: 97 F | HEIGHT: 70 IN | OXYGEN SATURATION: 95 % | BODY MASS INDEX: 30.43 KG/M2 | RESPIRATION RATE: 16 BRPM | SYSTOLIC BLOOD PRESSURE: 114 MMHG | DIASTOLIC BLOOD PRESSURE: 65 MMHG

## 2023-10-26 DIAGNOSIS — N23 URETERAL COLIC: ICD-10-CM

## 2023-10-26 DIAGNOSIS — N20.1 URETEROLITHIASIS: ICD-10-CM

## 2023-10-26 DIAGNOSIS — R91.1 PULMONARY NODULE: ICD-10-CM

## 2023-10-26 LAB
ALBUMIN SERPL BCP-MCNC: 4.5 G/DL (ref 3.2–4.9)
ALBUMIN/GLOB SERPL: 1.7 G/DL
ALP SERPL-CCNC: 69 U/L (ref 30–99)
ALT SERPL-CCNC: 52 U/L (ref 2–50)
ANION GAP SERPL CALC-SCNC: 11 MMOL/L (ref 7–16)
APPEARANCE UR: CLEAR
AST SERPL-CCNC: 43 U/L (ref 12–45)
BACTERIA #/AREA URNS HPF: NEGATIVE /HPF
BASOPHILS # BLD AUTO: 1.2 % (ref 0–1.8)
BASOPHILS # BLD: 0.06 K/UL (ref 0–0.12)
BILIRUB SERPL-MCNC: 0.3 MG/DL (ref 0.1–1.5)
BILIRUB UR QL STRIP.AUTO: NEGATIVE
BUN SERPL-MCNC: 10 MG/DL (ref 8–22)
CALCIUM ALBUM COR SERPL-MCNC: 8.9 MG/DL (ref 8.5–10.5)
CALCIUM SERPL-MCNC: 9.3 MG/DL (ref 8.5–10.5)
CHLORIDE SERPL-SCNC: 105 MMOL/L (ref 96–112)
CO2 SERPL-SCNC: 23 MMOL/L (ref 20–33)
COLOR UR: YELLOW
CREAT SERPL-MCNC: 1.18 MG/DL (ref 0.5–1.4)
EOSINOPHIL # BLD AUTO: 0.23 K/UL (ref 0–0.51)
EOSINOPHIL NFR BLD: 4.6 % (ref 0–6.9)
EPI CELLS #/AREA URNS HPF: NEGATIVE /HPF
ERYTHROCYTE [DISTWIDTH] IN BLOOD BY AUTOMATED COUNT: 42.8 FL (ref 35.9–50)
GFR SERPLBLD CREATININE-BSD FMLA CKD-EPI: 77 ML/MIN/1.73 M 2
GLOBULIN SER CALC-MCNC: 2.6 G/DL (ref 1.9–3.5)
GLUCOSE SERPL-MCNC: 110 MG/DL (ref 65–99)
GLUCOSE UR STRIP.AUTO-MCNC: NEGATIVE MG/DL
HCT VFR BLD AUTO: 45.2 % (ref 42–52)
HGB BLD-MCNC: 14.6 G/DL (ref 14–18)
HYALINE CASTS #/AREA URNS LPF: ABNORMAL /LPF
IMM GRANULOCYTES # BLD AUTO: 0.02 K/UL (ref 0–0.11)
IMM GRANULOCYTES NFR BLD AUTO: 0.4 % (ref 0–0.9)
KETONES UR STRIP.AUTO-MCNC: NEGATIVE MG/DL
LEUKOCYTE ESTERASE UR QL STRIP.AUTO: NEGATIVE
LIPASE SERPL-CCNC: 44 U/L (ref 11–82)
LYMPHOCYTES # BLD AUTO: 1.62 K/UL (ref 1–4.8)
LYMPHOCYTES NFR BLD: 32.3 % (ref 22–41)
MCH RBC QN AUTO: 26.6 PG (ref 27–33)
MCHC RBC AUTO-ENTMCNC: 32.3 G/DL (ref 32.3–36.5)
MCV RBC AUTO: 82.5 FL (ref 81.4–97.8)
MICRO URNS: ABNORMAL
MONOCYTES # BLD AUTO: 0.54 K/UL (ref 0–0.85)
MONOCYTES NFR BLD AUTO: 10.8 % (ref 0–13.4)
NEUTROPHILS # BLD AUTO: 2.54 K/UL (ref 1.82–7.42)
NEUTROPHILS NFR BLD: 50.7 % (ref 44–72)
NITRITE UR QL STRIP.AUTO: NEGATIVE
NRBC # BLD AUTO: 0 K/UL
NRBC BLD-RTO: 0 /100 WBC (ref 0–0.2)
PH UR STRIP.AUTO: 7.5 [PH] (ref 5–8)
PLATELET # BLD AUTO: 292 K/UL (ref 164–446)
PMV BLD AUTO: 10.1 FL (ref 9–12.9)
POTASSIUM SERPL-SCNC: 4.9 MMOL/L (ref 3.6–5.5)
PROT SERPL-MCNC: 7.1 G/DL (ref 6–8.2)
PROT UR QL STRIP: NEGATIVE MG/DL
RBC # BLD AUTO: 5.48 M/UL (ref 4.7–6.1)
RBC # URNS HPF: ABNORMAL /HPF
RBC UR QL AUTO: ABNORMAL
SODIUM SERPL-SCNC: 139 MMOL/L (ref 135–145)
SP GR UR STRIP.AUTO: 1.01
UROBILINOGEN UR STRIP.AUTO-MCNC: 0.2 MG/DL
WBC # BLD AUTO: 5 K/UL (ref 4.8–10.8)
WBC #/AREA URNS HPF: ABNORMAL /HPF

## 2023-10-26 PROCEDURE — 83690 ASSAY OF LIPASE: CPT

## 2023-10-26 PROCEDURE — 74176 CT ABD & PELVIS W/O CONTRAST: CPT

## 2023-10-26 PROCEDURE — 85025 COMPLETE CBC W/AUTO DIFF WBC: CPT

## 2023-10-26 PROCEDURE — 96375 TX/PRO/DX INJ NEW DRUG ADDON: CPT

## 2023-10-26 PROCEDURE — 80053 COMPREHEN METABOLIC PANEL: CPT

## 2023-10-26 PROCEDURE — 700111 HCHG RX REV CODE 636 W/ 250 OVERRIDE (IP): Mod: JZ | Performed by: EMERGENCY MEDICINE

## 2023-10-26 PROCEDURE — 81001 URINALYSIS AUTO W/SCOPE: CPT

## 2023-10-26 PROCEDURE — 99284 EMERGENCY DEPT VISIT MOD MDM: CPT

## 2023-10-26 PROCEDURE — 96374 THER/PROPH/DIAG INJ IV PUSH: CPT

## 2023-10-26 PROCEDURE — 36415 COLL VENOUS BLD VENIPUNCTURE: CPT

## 2023-10-26 RX ORDER — HYDROCODONE BITARTRATE AND ACETAMINOPHEN 5; 325 MG/1; MG/1
1 TABLET ORAL EVERY 6 HOURS PRN
Qty: 11 TABLET | Refills: 0 | Status: SHIPPED | OUTPATIENT
Start: 2023-10-26 | End: 2023-10-29

## 2023-10-26 RX ORDER — ONDANSETRON 2 MG/ML
4 INJECTION INTRAMUSCULAR; INTRAVENOUS ONCE
Status: COMPLETED | OUTPATIENT
Start: 2023-10-26 | End: 2023-10-26

## 2023-10-26 RX ORDER — NAPROXEN 500 MG/1
500 TABLET ORAL 2 TIMES DAILY WITH MEALS
Qty: 20 TABLET | Refills: 0 | Status: SHIPPED | OUTPATIENT
Start: 2023-10-26 | End: 2023-11-05

## 2023-10-26 RX ORDER — TAMSULOSIN HYDROCHLORIDE 0.4 MG/1
0.4 CAPSULE ORAL
Qty: 10 CAPSULE | Refills: 0 | Status: SHIPPED | OUTPATIENT
Start: 2023-10-26 | End: 2023-11-27

## 2023-10-26 RX ORDER — KETOROLAC TROMETHAMINE 30 MG/ML
30 INJECTION, SOLUTION INTRAMUSCULAR; INTRAVENOUS ONCE
Status: COMPLETED | OUTPATIENT
Start: 2023-10-26 | End: 2023-10-26

## 2023-10-26 RX ORDER — ONDANSETRON 4 MG/1
4 TABLET, ORALLY DISINTEGRATING ORAL EVERY 6 HOURS PRN
Qty: 10 TABLET | Refills: 0 | Status: SHIPPED | OUTPATIENT
Start: 2023-10-26 | End: 2023-11-27

## 2023-10-26 RX ADMIN — KETOROLAC TROMETHAMINE 30 MG: 30 INJECTION, SOLUTION INTRAMUSCULAR; INTRAVENOUS at 14:06

## 2023-10-26 RX ADMIN — ONDANSETRON 4 MG: 2 INJECTION INTRAMUSCULAR; INTRAVENOUS at 14:06

## 2023-10-26 ASSESSMENT — FIBROSIS 4 INDEX: FIB4 SCORE: 0.86

## 2023-10-26 NOTE — ED NOTES
Pt discharged to home. Pt was given follow up instructions and prescriptions for norco, flomax, naproxen and zofran. Pt verbalized understanding of all instructions for discharge and is ambulatory out of ED with steady gait. AOx4

## 2023-10-26 NOTE — ED PROVIDER NOTES
ED Provider Note    CHIEF COMPLAINT  Chief Complaint   Patient presents with    Flank Pain     Flank pain on left side since 1000 today; pain waxes and wanes but pt states he had to stop on the way home to vomit s/t the pain    Urinary Frequency     Pt states that for 2 days he feels like he needs to go all the time but not much comes out, pt denies pain with urination       EXTERNAL RECORDS REVIEWED  Outpatient Notes   and Outpatient labs & studies outpatient visits with primary care reviewed patient follows regularly for type 2 diabetes    HPI/ROS  LIMITATION TO HISTORY   Select: : None  OUTSIDE HISTORIAN(S):      Fredis Perry is a 48 y.o. male who presents to the emergency department with chief complaint of left flank pain.  Acute in onset began at 10 AM this morning this was 2 and half hours prior stabbing pain in the left flank which radiates into the left lower quadrant.  The pain is intermittent.  He does report that he had urinary hesitancy and urgency has no dysuria no hematuria no fevers he has had nausea and vomiting with severe pain today.  No previous history of kidney stones he has had no stool changes and had normal bowel movement this morning.    PAST MEDICAL HISTORY   has a past medical history of Anxiety, Depression, GERD (gastroesophageal reflux disease), High cholesterol, Hypertension, and Indigestion.    SURGICAL HISTORY   has a past surgical history that includes tonsillectomy and ventral hernia repair (N/A, 9/1/2020).    FAMILY HISTORY  Family History   Problem Relation Age of Onset    Psychiatric Illness Mother     Heart Disease Father     Hypertension Father     Hyperlipidemia Father     Alcohol/Drug Father     Diabetes Maternal Grandmother     Heart Disease Maternal Grandfather     Stroke Maternal Grandfather        SOCIAL HISTORY  Social History     Tobacco Use    Smoking status: Former     Types: Cigarettes    Smokeless tobacco: Former   Vaping Use    Vaping Use: Never used  "  Substance and Sexual Activity    Alcohol use: Not Currently     Comment: none x5 years    Drug use: Not Currently    Sexual activity: Yes       CURRENT MEDICATIONS  Home Medications       Reviewed by Grace Stanton R.N. (Registered Nurse) on 10/26/23 at 1216  Med List Status: Not Addressed     Medication Last Dose Status   atorvastatin (LIPITOR) 10 MG Tab  Active   benzonatate (TESSALON) 100 MG Cap  Active   dexamethasone (DECADRON) 6 MG Tab  Active   escitalopram (LEXAPRO) 20 MG tablet  Active   fenofibrate (TRICOR) 145 MG Tab  Active   Ferrous Sulfate (IRON PO)  Active   metFORMIN (GLUCOPHAGE) 500 MG Tab  Active   mupirocin (BACTROBAN) 2 % Ointment  Active   nadolol (CORGARD) 20 MG Tab  Active   omeprazole (PRILOSEC) 20 MG delayed-release capsule  Active   promethazine-dextromethorphan (PROMETHAZINE-DM) 6.25-15 MG/5ML syrup  Active   vitamin D (CHOLECALCIFEROL) 1000 UNIT Tab  Active                    ALLERGIES  No Known Allergies    PHYSICAL EXAM  VITAL SIGNS: /64   Pulse 62   Temp 36.4 °C (97.5 °F) (Temporal)   Resp 16   Ht 1.778 m (5' 10\")   Wt 96.4 kg (212 lb 8.4 oz)   SpO2 97%   BMI 30.49 kg/m²      Pulse ox interpretation: I interpret this pulse ox as normal.  Constitutional: Alert and oriented x 3, normal distress  HEENT: Atraumatic normocephalic, pupils are equal round reactive to light extraocular movements are intact. The nares is clear, external ears are normal, mouth shows moist mucous membranes normal dentition for age  Neck: Supple, no JVD no tracheal deviation  Cardiovascular: Regular rate and rhythm no murmur rub or gallop 2+ pulses peripherally x4  Thorax & Lungs: No respiratory distress, no wheezes rales or rhonchi, No chest tenderness.   GI: Soft nontender nondistended positive bowel sounds, no peritoneal signs  Skin: Warm dry no acute rash or lesion  Musculoskeletal: Moving all extremities with full range and 5 of 5 strength no acute  deformity  Neurologic: Cranial nerves III " through XII are grossly intact no sensory deficit no cerebellar dysfunction   Psychiatric: Appropriate affect for situation at this time      DIAGNOSTIC STUDIES / PROCEDURES  Results for orders placed or performed during the hospital encounter of 10/26/23   CBC WITH DIFFERENTIAL   Result Value Ref Range    WBC 5.0 4.8 - 10.8 K/uL    RBC 5.48 4.70 - 6.10 M/uL    Hemoglobin 14.6 14.0 - 18.0 g/dL    Hematocrit 45.2 42.0 - 52.0 %    MCV 82.5 81.4 - 97.8 fL    MCH 26.6 (L) 27.0 - 33.0 pg    MCHC 32.3 32.3 - 36.5 g/dL    RDW 42.8 35.9 - 50.0 fL    Platelet Count 292 164 - 446 K/uL    MPV 10.1 9.0 - 12.9 fL    Neutrophils-Polys 50.70 44.00 - 72.00 %    Lymphocytes 32.30 22.00 - 41.00 %    Monocytes 10.80 0.00 - 13.40 %    Eosinophils 4.60 0.00 - 6.90 %    Basophils 1.20 0.00 - 1.80 %    Immature Granulocytes 0.40 0.00 - 0.90 %    Nucleated RBC 0.00 0.00 - 0.20 /100 WBC    Neutrophils (Absolute) 2.54 1.82 - 7.42 K/uL    Lymphs (Absolute) 1.62 1.00 - 4.80 K/uL    Monos (Absolute) 0.54 0.00 - 0.85 K/uL    Eos (Absolute) 0.23 0.00 - 0.51 K/uL    Baso (Absolute) 0.06 0.00 - 0.12 K/uL    Immature Granulocytes (abs) 0.02 0.00 - 0.11 K/uL    NRBC (Absolute) 0.00 K/uL   COMP METABOLIC PANEL   Result Value Ref Range    Sodium 139 135 - 145 mmol/L    Potassium 4.9 3.6 - 5.5 mmol/L    Chloride 105 96 - 112 mmol/L    Co2 23 20 - 33 mmol/L    Anion Gap 11.0 7.0 - 16.0    Glucose 110 (H) 65 - 99 mg/dL    Bun 10 8 - 22 mg/dL    Creatinine 1.18 0.50 - 1.40 mg/dL    Calcium 9.3 8.5 - 10.5 mg/dL    Correct Calcium 8.9 8.5 - 10.5 mg/dL    AST(SGOT) 43 12 - 45 U/L    ALT(SGPT) 52 (H) 2 - 50 U/L    Alkaline Phosphatase 69 30 - 99 U/L    Total Bilirubin 0.3 0.1 - 1.5 mg/dL    Albumin 4.5 3.2 - 4.9 g/dL    Total Protein 7.1 6.0 - 8.2 g/dL    Globulin 2.6 1.9 - 3.5 g/dL    A-G Ratio 1.7 g/dL   LIPASE   Result Value Ref Range    Lipase 44 11 - 82 U/L   URINALYSIS    Specimen: Urine   Result Value Ref Range    Color Yellow     Character Clear      Specific Gravity 1.010 <1.035    Ph 7.5 5.0 - 8.0    Glucose Negative Negative mg/dL    Ketones Negative Negative mg/dL    Protein Negative Negative mg/dL    Bilirubin Negative Negative    Urobilinogen, Urine 0.2 Negative    Nitrite Negative Negative    Leukocyte Esterase Negative Negative    Occult Blood Moderate (A) Negative    Micro Urine Req Microscopic    ESTIMATED GFR   Result Value Ref Range    GFR (CKD-EPI) 77 >60 mL/min/1.73 m 2   URINE MICROSCOPIC (W/UA)   Result Value Ref Range    WBC 0-2 (A) /hpf    RBC 5-10 (A) /hpf    Bacteria Negative None /hpf    Epithelial Cells Negative /hpf    Hyaline Cast 0-2 /lpf        RADIOLOGY  CT-RENAL COLIC EVALUATION(A/P W/O)   Final Result      1.  2 mm stone at the left UVJ. No significant hydronephrosis.   2.  Hepatic steatosis.   3.  Cholelithiasis.   4.  Sub-6 mm pulmonary nodules at the bilateral lower lobes.      Fleischner society recommendations for follow up:   Low Risk: No routine follow-up      High Risk: Optional CT at 12 months      Comments: Use most suspicious nodule as guide to management. Follow-up intervals may vary according to size and risk.      Low Risk - Minimal or absent history of smoking and of other known risk factors.      High Risk - History of smoking or of other known risk factors.      Note: These recommendations do not apply to lung cancer screening, patients with immunosuppression, or patients with known primary cancer.      Fleischner Society 2017 Guidelines for Management of Incidentally Detected Pulmonary Nodules in Adults            COURSE & MEDICAL DECISION MAKING    ED Observation Status? No; Patient does not meet criteria for ED Observation.     ASSESSMENT, COURSE AND PLAN  Care Narrative: Pleasant 48-year-old male presents with left-sided flank pain colicky pain in nature his work-up today does demonstrate a 2 mm distal UVJ stone without any significant hydro no renal compromise no sign of infection at this time.  Feeling much  better given urine strainer he is given a prescription for naproxen Zofran Flomax and Norco.  Given instructions to strain all urine return here for worsening pain fevers nausea vomiting any other acute symptom change or concern follow-up with urology if unable to pass stone in the next few days.  Return to the ER for any worsening symptoms or concerns discharged in stable and improved condition.        ADDITIONAL PROBLEM LIST    DISPOSITION AND DISCUSSIONS      I have discussed management of the patient with the following physicians and DUSTIN's:      Discussion of management with other QHP or appropriate source(s): None     Escalation of care considered, and ultimately not performed:acute inpatient care management, however at this time, the patient is most appropriate for outpatient management    Barriers to care at this time, including but not limited to: .     Decision tools and prescription drugs considered including, but not limited to:  Prescription pain medicine anti-inflammatory antiemetic  .  Naga Carrillo D.O.  [address]  [address]  [facility] [id number] [phone]      for re-check of pulmonary nodule    Urology [place] [address] [address]  [facility] [address]  [phone]          [facility], Emergency Dept  [address] [facility] [address] [phone]    in 12-24 hours if symptoms persist, immediately If symptoms worsen, or if you develop any other symptoms or concerns      FINAL DIAGNOSIS  1. Ureteral colic Active   2. Ureterolithiasis Active   3. Pulmonary nodule           Electronically signed by: Prabhakar Rasmussen M.D.

## 2023-10-26 NOTE — ED TRIAGE NOTES
Chief Complaint   Patient presents with    Flank Pain     Flank pain on left side since 1000 today; pain waxes and wanes but pt states he had to stop on the way home to vomit s/t the pain    Urinary Frequency     Pt states that for 2 days he feels like he needs to go all the time but not much comes out, pt denies pain with urination     Pt ambulatory to triage with wife for above complaint. Pt states he has had the chills for the last couple hours as well and is shivering on/off in triage room. Pt denies N/V other than when the pain gets severe.     Pt is alert/oriented and follows commands. Pt speaking in full sentences and responds appropriately to questions. No acute distress noted in triage and respirations are even and unlabored.     Pt placed in lobby and educated on triage process. Pt and wife encouraged to alert staff for any changes in condition.

## 2023-11-11 ENCOUNTER — HOSPITAL ENCOUNTER (OUTPATIENT)
Dept: LAB | Facility: MEDICAL CENTER | Age: 48
End: 2023-11-11
Attending: STUDENT IN AN ORGANIZED HEALTH CARE EDUCATION/TRAINING PROGRAM
Payer: COMMERCIAL

## 2023-11-11 DIAGNOSIS — E11.69 HYPERLIPIDEMIA ASSOCIATED WITH TYPE 2 DIABETES MELLITUS (HCC): ICD-10-CM

## 2023-11-11 DIAGNOSIS — E11.9 TYPE 2 DIABETES MELLITUS WITHOUT COMPLICATION, WITHOUT LONG-TERM CURRENT USE OF INSULIN (HCC): ICD-10-CM

## 2023-11-11 DIAGNOSIS — D50.8 OTHER IRON DEFICIENCY ANEMIA: ICD-10-CM

## 2023-11-11 DIAGNOSIS — E66.9 OBESITY (BMI 30-39.9): ICD-10-CM

## 2023-11-11 DIAGNOSIS — E78.5 HYPERLIPIDEMIA ASSOCIATED WITH TYPE 2 DIABETES MELLITUS (HCC): ICD-10-CM

## 2023-11-11 LAB
ALBUMIN SERPL BCP-MCNC: 4.7 G/DL (ref 3.2–4.9)
ALBUMIN/GLOB SERPL: 2 G/DL
ALP SERPL-CCNC: 62 U/L (ref 30–99)
ALT SERPL-CCNC: 62 U/L (ref 2–50)
ANION GAP SERPL CALC-SCNC: 10 MMOL/L (ref 7–16)
AST SERPL-CCNC: 45 U/L (ref 12–45)
BASOPHILS # BLD AUTO: 1.4 % (ref 0–1.8)
BASOPHILS # BLD: 0.06 K/UL (ref 0–0.12)
BILIRUB SERPL-MCNC: 0.4 MG/DL (ref 0.1–1.5)
BUN SERPL-MCNC: 9 MG/DL (ref 8–22)
CALCIUM ALBUM COR SERPL-MCNC: 9 MG/DL (ref 8.5–10.5)
CALCIUM SERPL-MCNC: 9.6 MG/DL (ref 8.5–10.5)
CHLORIDE SERPL-SCNC: 104 MMOL/L (ref 96–112)
CHOLEST SERPL-MCNC: 139 MG/DL (ref 100–199)
CO2 SERPL-SCNC: 26 MMOL/L (ref 20–33)
CREAT SERPL-MCNC: 0.98 MG/DL (ref 0.5–1.4)
CREAT UR-MCNC: 219.36 MG/DL
EOSINOPHIL # BLD AUTO: 0.17 K/UL (ref 0–0.51)
EOSINOPHIL NFR BLD: 3.8 % (ref 0–6.9)
ERYTHROCYTE [DISTWIDTH] IN BLOOD BY AUTOMATED COUNT: 43.3 FL (ref 35.9–50)
EST. AVERAGE GLUCOSE BLD GHB EST-MCNC: 126 MG/DL
FERRITIN SERPL-MCNC: 38.1 NG/ML (ref 22–322)
GFR SERPLBLD CREATININE-BSD FMLA CKD-EPI: 96 ML/MIN/1.73 M 2
GLOBULIN SER CALC-MCNC: 2.4 G/DL (ref 1.9–3.5)
GLUCOSE SERPL-MCNC: 121 MG/DL (ref 65–99)
HBA1C MFR BLD: 6 % (ref 4–5.6)
HCT VFR BLD AUTO: 47.9 % (ref 42–52)
HDLC SERPL-MCNC: 31 MG/DL
HGB BLD-MCNC: 15 G/DL (ref 14–18)
IMM GRANULOCYTES # BLD AUTO: 0.01 K/UL (ref 0–0.11)
IMM GRANULOCYTES NFR BLD AUTO: 0.2 % (ref 0–0.9)
IRON SATN MFR SERPL: 16 % (ref 15–55)
IRON SERPL-MCNC: 66 UG/DL (ref 50–180)
LDLC SERPL CALC-MCNC: 92 MG/DL
LYMPHOCYTES # BLD AUTO: 1.57 K/UL (ref 1–4.8)
LYMPHOCYTES NFR BLD: 35.4 % (ref 22–41)
MCH RBC QN AUTO: 26.5 PG (ref 27–33)
MCHC RBC AUTO-ENTMCNC: 31.3 G/DL (ref 32.3–36.5)
MCV RBC AUTO: 84.6 FL (ref 81.4–97.8)
MICROALBUMIN UR-MCNC: <1.2 MG/DL
MICROALBUMIN/CREAT UR: NORMAL MG/G (ref 0–30)
MONOCYTES # BLD AUTO: 0.44 K/UL (ref 0–0.85)
MONOCYTES NFR BLD AUTO: 9.9 % (ref 0–13.4)
NEUTROPHILS # BLD AUTO: 2.18 K/UL (ref 1.82–7.42)
NEUTROPHILS NFR BLD: 49.3 % (ref 44–72)
NRBC # BLD AUTO: 0 K/UL
NRBC BLD-RTO: 0 /100 WBC (ref 0–0.2)
PLATELET # BLD AUTO: 294 K/UL (ref 164–446)
PMV BLD AUTO: 11.1 FL (ref 9–12.9)
POTASSIUM SERPL-SCNC: 4.7 MMOL/L (ref 3.6–5.5)
PROT SERPL-MCNC: 7.1 G/DL (ref 6–8.2)
RBC # BLD AUTO: 5.66 M/UL (ref 4.7–6.1)
SODIUM SERPL-SCNC: 140 MMOL/L (ref 135–145)
TIBC SERPL-MCNC: 422 UG/DL (ref 250–450)
TRIGL SERPL-MCNC: 79 MG/DL (ref 0–149)
TSH SERPL DL<=0.005 MIU/L-ACNC: 1.42 UIU/ML (ref 0.38–5.33)
UIBC SERPL-MCNC: 356 UG/DL (ref 110–370)
WBC # BLD AUTO: 4.4 K/UL (ref 4.8–10.8)

## 2023-11-11 PROCEDURE — 82728 ASSAY OF FERRITIN: CPT

## 2023-11-11 PROCEDURE — 84443 ASSAY THYROID STIM HORMONE: CPT

## 2023-11-11 PROCEDURE — 82043 UR ALBUMIN QUANTITATIVE: CPT

## 2023-11-11 PROCEDURE — 80053 COMPREHEN METABOLIC PANEL: CPT

## 2023-11-11 PROCEDURE — 85025 COMPLETE CBC W/AUTO DIFF WBC: CPT

## 2023-11-11 PROCEDURE — 82570 ASSAY OF URINE CREATININE: CPT

## 2023-11-11 PROCEDURE — 36415 COLL VENOUS BLD VENIPUNCTURE: CPT

## 2023-11-11 PROCEDURE — 80061 LIPID PANEL: CPT

## 2023-11-11 PROCEDURE — 83550 IRON BINDING TEST: CPT

## 2023-11-11 PROCEDURE — 83540 ASSAY OF IRON: CPT

## 2023-11-11 PROCEDURE — 83036 HEMOGLOBIN GLYCOSYLATED A1C: CPT

## 2023-11-25 SDOH — HEALTH STABILITY: PHYSICAL HEALTH: ON AVERAGE, HOW MANY MINUTES DO YOU ENGAGE IN EXERCISE AT THIS LEVEL?: 20 MIN

## 2023-11-25 SDOH — ECONOMIC STABILITY: FOOD INSECURITY: WITHIN THE PAST 12 MONTHS, YOU WORRIED THAT YOUR FOOD WOULD RUN OUT BEFORE YOU GOT MONEY TO BUY MORE.: NEVER TRUE

## 2023-11-25 SDOH — ECONOMIC STABILITY: FOOD INSECURITY: WITHIN THE PAST 12 MONTHS, THE FOOD YOU BOUGHT JUST DIDN'T LAST AND YOU DIDN'T HAVE MONEY TO GET MORE.: NEVER TRUE

## 2023-11-25 SDOH — HEALTH STABILITY: PHYSICAL HEALTH: ON AVERAGE, HOW MANY DAYS PER WEEK DO YOU ENGAGE IN MODERATE TO STRENUOUS EXERCISE (LIKE A BRISK WALK)?: 2 DAYS

## 2023-11-25 SDOH — ECONOMIC STABILITY: INCOME INSECURITY: HOW HARD IS IT FOR YOU TO PAY FOR THE VERY BASICS LIKE FOOD, HOUSING, MEDICAL CARE, AND HEATING?: NOT VERY HARD

## 2023-11-25 SDOH — ECONOMIC STABILITY: HOUSING INSECURITY: IN THE LAST 12 MONTHS, HOW MANY PLACES HAVE YOU LIVED?: 1

## 2023-11-25 SDOH — ECONOMIC STABILITY: INCOME INSECURITY: IN THE LAST 12 MONTHS, WAS THERE A TIME WHEN YOU WERE NOT ABLE TO PAY THE MORTGAGE OR RENT ON TIME?: NO

## 2023-11-25 ASSESSMENT — SOCIAL DETERMINANTS OF HEALTH (SDOH)
DO YOU BELONG TO ANY CLUBS OR ORGANIZATIONS SUCH AS CHURCH GROUPS UNIONS, FRATERNAL OR ATHLETIC GROUPS, OR SCHOOL GROUPS?: NO
HOW OFTEN DO YOU ATTENT MEETINGS OF THE CLUB OR ORGANIZATION YOU BELONG TO?: NEVER
HOW OFTEN DO YOU GET TOGETHER WITH FRIENDS OR RELATIVES?: ONCE A WEEK
WITHIN THE PAST 12 MONTHS, YOU WORRIED THAT YOUR FOOD WOULD RUN OUT BEFORE YOU GOT THE MONEY TO BUY MORE: NEVER TRUE
HOW OFTEN DO YOU GET TOGETHER WITH FRIENDS OR RELATIVES?: ONCE A WEEK
HOW HARD IS IT FOR YOU TO PAY FOR THE VERY BASICS LIKE FOOD, HOUSING, MEDICAL CARE, AND HEATING?: NOT VERY HARD
HOW MANY DRINKS CONTAINING ALCOHOL DO YOU HAVE ON A TYPICAL DAY WHEN YOU ARE DRINKING: PATIENT DOES NOT DRINK
DO YOU BELONG TO ANY CLUBS OR ORGANIZATIONS SUCH AS CHURCH GROUPS UNIONS, FRATERNAL OR ATHLETIC GROUPS, OR SCHOOL GROUPS?: NO
IN A TYPICAL WEEK, HOW MANY TIMES DO YOU TALK ON THE PHONE WITH FAMILY, FRIENDS, OR NEIGHBORS?: TWICE A WEEK
HOW OFTEN DO YOU ATTENT MEETINGS OF THE CLUB OR ORGANIZATION YOU BELONG TO?: NEVER
HOW OFTEN DO YOU ATTEND CHURCH OR RELIGIOUS SERVICES?: NEVER
IN A TYPICAL WEEK, HOW MANY TIMES DO YOU TALK ON THE PHONE WITH FAMILY, FRIENDS, OR NEIGHBORS?: TWICE A WEEK
HOW OFTEN DO YOU HAVE A DRINK CONTAINING ALCOHOL: NEVER
HOW OFTEN DO YOU HAVE SIX OR MORE DRINKS ON ONE OCCASION: NEVER
HOW OFTEN DO YOU ATTEND CHURCH OR RELIGIOUS SERVICES?: NEVER

## 2023-11-25 ASSESSMENT — LIFESTYLE VARIABLES
HOW OFTEN DO YOU HAVE SIX OR MORE DRINKS ON ONE OCCASION: NEVER
AUDIT-C TOTAL SCORE: 0
HOW MANY STANDARD DRINKS CONTAINING ALCOHOL DO YOU HAVE ON A TYPICAL DAY: PATIENT DOES NOT DRINK
SKIP TO QUESTIONS 9-10: 1
HOW OFTEN DO YOU HAVE A DRINK CONTAINING ALCOHOL: NEVER

## 2023-11-27 ENCOUNTER — HOSPITAL ENCOUNTER (OUTPATIENT)
Facility: MEDICAL CENTER | Age: 48
End: 2023-11-27
Attending: STUDENT IN AN ORGANIZED HEALTH CARE EDUCATION/TRAINING PROGRAM
Payer: COMMERCIAL

## 2023-11-27 ENCOUNTER — OFFICE VISIT (OUTPATIENT)
Dept: MEDICAL GROUP | Facility: MEDICAL CENTER | Age: 48
End: 2023-11-27
Payer: COMMERCIAL

## 2023-11-27 VITALS
HEART RATE: 64 BPM | OXYGEN SATURATION: 98 % | WEIGHT: 215 LBS | BODY MASS INDEX: 30.78 KG/M2 | HEIGHT: 70 IN | TEMPERATURE: 98.5 F | DIASTOLIC BLOOD PRESSURE: 64 MMHG | SYSTOLIC BLOOD PRESSURE: 110 MMHG

## 2023-11-27 DIAGNOSIS — D50.8 OTHER IRON DEFICIENCY ANEMIA: ICD-10-CM

## 2023-11-27 DIAGNOSIS — K21.9 GASTROESOPHAGEAL REFLUX DISEASE WITHOUT ESOPHAGITIS: ICD-10-CM

## 2023-11-27 DIAGNOSIS — N20.0 NEPHROLITHIASIS: ICD-10-CM

## 2023-11-27 DIAGNOSIS — Z23 IMMUNIZATION DUE: ICD-10-CM

## 2023-11-27 DIAGNOSIS — F41.9 ANXIETY AND DEPRESSION: ICD-10-CM

## 2023-11-27 DIAGNOSIS — E78.5 HYPERLIPIDEMIA ASSOCIATED WITH TYPE 2 DIABETES MELLITUS (HCC): ICD-10-CM

## 2023-11-27 DIAGNOSIS — R91.1 INCIDENTAL LUNG NODULE: ICD-10-CM

## 2023-11-27 DIAGNOSIS — E11.69 HYPERLIPIDEMIA ASSOCIATED WITH TYPE 2 DIABETES MELLITUS (HCC): ICD-10-CM

## 2023-11-27 DIAGNOSIS — Z00.00 ENCOUNTER FOR MEDICAL EXAMINATION TO ESTABLISH CARE: ICD-10-CM

## 2023-11-27 DIAGNOSIS — K80.20 CALCULUS OF GALLBLADDER WITHOUT CHOLECYSTITIS WITHOUT OBSTRUCTION: ICD-10-CM

## 2023-11-27 DIAGNOSIS — F32.A ANXIETY AND DEPRESSION: ICD-10-CM

## 2023-11-27 DIAGNOSIS — E78.1 HIGH TRIGLYCERIDES: ICD-10-CM

## 2023-11-27 PROBLEM — H61.21 RIGHT EAR IMPACTED CERUMEN: Status: RESOLVED | Noted: 2022-05-20 | Resolved: 2023-11-27

## 2023-11-27 PROBLEM — K62.5 BRIGHT RED BLOOD PER RECTUM: Status: RESOLVED | Noted: 2022-04-18 | Resolved: 2023-11-27

## 2023-11-27 PROBLEM — K43.6 INCARCERATED VENTRAL HERNIA: Status: RESOLVED | Noted: 2020-09-01 | Resolved: 2023-11-27

## 2023-11-27 PROCEDURE — 90472 IMMUNIZATION ADMIN EACH ADD: CPT | Performed by: STUDENT IN AN ORGANIZED HEALTH CARE EDUCATION/TRAINING PROGRAM

## 2023-11-27 PROCEDURE — 3074F SYST BP LT 130 MM HG: CPT | Performed by: STUDENT IN AN ORGANIZED HEALTH CARE EDUCATION/TRAINING PROGRAM

## 2023-11-27 PROCEDURE — 99214 OFFICE O/P EST MOD 30 MIN: CPT | Mod: 25 | Performed by: STUDENT IN AN ORGANIZED HEALTH CARE EDUCATION/TRAINING PROGRAM

## 2023-11-27 PROCEDURE — 90686 IIV4 VACC NO PRSV 0.5 ML IM: CPT | Performed by: STUDENT IN AN ORGANIZED HEALTH CARE EDUCATION/TRAINING PROGRAM

## 2023-11-27 PROCEDURE — 90471 IMMUNIZATION ADMIN: CPT | Performed by: STUDENT IN AN ORGANIZED HEALTH CARE EDUCATION/TRAINING PROGRAM

## 2023-11-27 PROCEDURE — 90677 PCV20 VACCINE IM: CPT | Performed by: STUDENT IN AN ORGANIZED HEALTH CARE EDUCATION/TRAINING PROGRAM

## 2023-11-27 PROCEDURE — 82365 CALCULUS SPECTROSCOPY: CPT

## 2023-11-27 PROCEDURE — 3078F DIAST BP <80 MM HG: CPT | Performed by: STUDENT IN AN ORGANIZED HEALTH CARE EDUCATION/TRAINING PROGRAM

## 2023-11-27 ASSESSMENT — FIBROSIS 4 INDEX: FIB4 SCORE: 0.89

## 2023-11-27 NOTE — PROGRESS NOTES
Subjective:     CC:  Diagnoses of Encounter for medical examination to establish care, Nephrolithiasis, Hyperlipidemia associated with type 2 diabetes mellitus (HCC), Anxiety and depression, High triglycerides, Other iron deficiency anemia, Gastroesophageal reflux disease without esophagitis, Calculus of gallbladder without cholecystitis without obstruction, Incidental lung nodule, and Immunization due were pertinent to this visit.    HISTORY OF THE PRESENT ILLNESS: Patient is a 48 y.o. male. This pleasant patient is here today to establish care and discuss the following    Problem   Calculus of Gallbladder Without Cholecystitis Without Obstruction    Incidentally seen on CT scan.  Asymptomatic at this time.     Incidental Lung Nodule    Incidental lung nodules less than 6 mm seen on CT scan for kidney stones.  He does have a history of smoking, making him high risk so we will repeat CT scan in 1 year     Nephrolithiasis    Recent kidney stone, passed the stone and is requesting stone evaluation     Iron Deficiency Anemia    Chronic condition. He has seen GI and had EGD/colonoscopy 11/2022 which were overall normal.  May be secondary to chronic hemorrhoids.  Recent labs were WNL.  He takes daily iron supplementation.     High Triglycerides    Chronic condition.  Current regimen: fenofibrate 145mg daily       Hyperlipidemia Associated With Type 2 Diabetes Mellitus (Hcc)    Chronic condition. Current regimen: atorvastatin 10mg qhs, fenofibrate 145mg daily        Anxiety and Depression    Chronic condition.  Well-controlled.  Current regimen: escitalopram 20mg daily, nadolol 20mg daily       Gerd (Gastroesophageal Reflux Disease)    Chronic condition.  Current regimen: OTC omeprazole 20mg daily       Right Ear Impacted Cerumen (Resolved)    Acute condition for 3 weeks.     Bright Red Blood Per Rectum (Resolved)   Incarcerated Ventral Hernia (Resolved)   Multiple Joint Pain (Resolved)       ROS:   ROS      Objective:  "    Exam: /64 (BP Location: Left arm, Patient Position: Sitting, BP Cuff Size: Adult)   Pulse 64   Temp 36.9 °C (98.5 °F) (Temporal)   Ht 1.778 m (5' 10\")   Wt 97.5 kg (215 lb)   SpO2 98%  Body mass index is 30.85 kg/m².    Physical Exam  Vitals reviewed.   Constitutional:       General: He is not in acute distress.     Appearance: He is not toxic-appearing.   HENT:      Head: Normocephalic and atraumatic.      Right Ear: External ear normal.      Left Ear: External ear normal.   Eyes:      General:         Right eye: No discharge.         Left eye: No discharge.      Extraocular Movements: Extraocular movements intact.      Conjunctiva/sclera: Conjunctivae normal.   Cardiovascular:      Rate and Rhythm: Normal rate and regular rhythm.      Heart sounds: Normal heart sounds. No murmur heard.  Pulmonary:      Effort: Pulmonary effort is normal. No respiratory distress.      Breath sounds: Normal breath sounds. No wheezing or rales.   Skin:     General: Skin is warm and dry.   Neurological:      Mental Status: He is alert.   Psychiatric:         Mood and Affect: Mood normal.         Behavior: Behavior normal.         Thought Content: Thought content normal.         Judgment: Judgment normal.           Assessment & Plan:   48 y.o. male with the following -    1. Encounter for medical examination to establish care      2. Nephrolithiasis  Stone sent for analysis  - STONE ANALYSIS    3. Hyperlipidemia associated with type 2 diabetes mellitus (HCC)  Continue Lipitor and fenofibrate    4. Anxiety and depression  Continue Lexapro and Adderall all    5. High triglycerides  Continue fenofibrate    6. Other iron deficiency anemia  Continue iron supplementation, currently normal labs, patient will let me know if he wants a banding for his hemorrhoids    7. Gastroesophageal reflux disease without esophagitis  Continue over-the-counter omeprazole    8. Calculus of gallbladder without cholecystitis without " obstruction  Discussed the risks of having asymptomatic gallstones including pancreatitis and cholecystitis or bile duct obstruction, patient will let me know if he wants referral to general surgery for possible removal    9. Incidental lung nodule  Incidental less than 6 mm lung nodules, high risk given his history of smoking, repeat CT scan in 12 months    10. Immunization due  - Influenza Vaccine Quad Injection (PF)  - Pneumococcal Conjugate Vaccine 20-Valent (6 wks+)      No follow-ups on file.    Please note that this dictation was created using voice recognition software. I have made every reasonable attempt to correct obvious errors, but I expect that there are errors of grammar and possibly content that I did not discover before finalizing the note.

## 2023-11-30 LAB
APPEARANCE STONE: NORMAL
COMPN STONE: NORMAL
SPECIMEN WT: 4 MG

## 2023-12-19 RX ORDER — FENOFIBRATE 145 MG/1
145 TABLET, COATED ORAL DAILY
Qty: 90 TABLET | Refills: 3 | Status: SHIPPED | OUTPATIENT
Start: 2023-12-19

## 2024-03-09 DIAGNOSIS — E11.9 TYPE 2 DIABETES MELLITUS WITHOUT COMPLICATION, WITHOUT LONG-TERM CURRENT USE OF INSULIN (HCC): ICD-10-CM

## 2024-03-09 DIAGNOSIS — F41.9 ANXIETY AND DEPRESSION: ICD-10-CM

## 2024-03-09 DIAGNOSIS — F32.A ANXIETY AND DEPRESSION: ICD-10-CM

## 2024-03-11 RX ORDER — ESCITALOPRAM OXALATE 20 MG/1
TABLET ORAL
Qty: 90 TABLET | Refills: 3 | Status: SHIPPED | OUTPATIENT
Start: 2024-03-11

## 2024-03-11 NOTE — TELEPHONE ENCOUNTER
Received request via: Patient    Was the patient seen in the last year in this department? Yes    Does the patient have an active prescription (recently filled or refills available) for medication(s) requested? No      Does the patient have [facility] Plus and need 100 day supply (blood pressure, diabetes and cholesterol meds only)? Patient does not have SCP

## 2024-05-11 ENCOUNTER — PATIENT MESSAGE (OUTPATIENT)
Dept: MEDICAL GROUP | Facility: MEDICAL CENTER | Age: 49
End: 2024-05-11
Payer: COMMERCIAL

## 2024-05-11 DIAGNOSIS — F32.A ANXIETY AND DEPRESSION: ICD-10-CM

## 2024-05-11 DIAGNOSIS — F41.9 ANXIETY AND DEPRESSION: ICD-10-CM

## 2024-05-11 DIAGNOSIS — E78.1 HIGH TRIGLYCERIDES: ICD-10-CM

## 2024-05-11 DIAGNOSIS — E11.9 TYPE 2 DIABETES MELLITUS WITHOUT COMPLICATION, WITHOUT LONG-TERM CURRENT USE OF INSULIN (HCC): ICD-10-CM

## 2024-05-13 RX ORDER — ATORVASTATIN CALCIUM 10 MG/1
10 TABLET, FILM COATED ORAL EVERY EVENING
Qty: 90 TABLET | Refills: 3 | Status: SHIPPED | OUTPATIENT
Start: 2024-05-13

## 2024-05-13 RX ORDER — NADOLOL 20 MG/1
20 TABLET ORAL DAILY
Qty: 90 TABLET | Refills: 3 | Status: SHIPPED | OUTPATIENT
Start: 2024-05-13

## 2024-05-20 ENCOUNTER — OFFICE VISIT (OUTPATIENT)
Dept: MEDICAL GROUP | Facility: MEDICAL CENTER | Age: 49
End: 2024-05-20
Payer: COMMERCIAL

## 2024-05-20 VITALS
HEART RATE: 68 BPM | BODY MASS INDEX: 30.92 KG/M2 | DIASTOLIC BLOOD PRESSURE: 64 MMHG | WEIGHT: 216 LBS | HEIGHT: 70 IN | SYSTOLIC BLOOD PRESSURE: 116 MMHG | TEMPERATURE: 97.3 F | OXYGEN SATURATION: 99 %

## 2024-05-20 DIAGNOSIS — Z13.29 SCREENING FOR THYROID DISORDER: ICD-10-CM

## 2024-05-20 DIAGNOSIS — F41.9 ANXIETY AND DEPRESSION: ICD-10-CM

## 2024-05-20 DIAGNOSIS — E11.69 HYPERLIPIDEMIA ASSOCIATED WITH TYPE 2 DIABETES MELLITUS (HCC): ICD-10-CM

## 2024-05-20 DIAGNOSIS — F32.A ANXIETY AND DEPRESSION: ICD-10-CM

## 2024-05-20 DIAGNOSIS — E11.9 TYPE 2 DIABETES MELLITUS WITHOUT COMPLICATION, WITHOUT LONG-TERM CURRENT USE OF INSULIN (HCC): ICD-10-CM

## 2024-05-20 DIAGNOSIS — E78.5 HYPERLIPIDEMIA ASSOCIATED WITH TYPE 2 DIABETES MELLITUS (HCC): ICD-10-CM

## 2024-05-20 DIAGNOSIS — D50.8 OTHER IRON DEFICIENCY ANEMIA: ICD-10-CM

## 2024-05-20 LAB
HBA1C MFR BLD: 6 % (ref ?–5.8)
POCT INT CON NEG: NEGATIVE
POCT INT CON POS: POSITIVE

## 2024-05-20 PROCEDURE — 83036 HEMOGLOBIN GLYCOSYLATED A1C: CPT | Performed by: STUDENT IN AN ORGANIZED HEALTH CARE EDUCATION/TRAINING PROGRAM

## 2024-05-20 PROCEDURE — 3078F DIAST BP <80 MM HG: CPT | Performed by: STUDENT IN AN ORGANIZED HEALTH CARE EDUCATION/TRAINING PROGRAM

## 2024-05-20 PROCEDURE — 3074F SYST BP LT 130 MM HG: CPT | Performed by: STUDENT IN AN ORGANIZED HEALTH CARE EDUCATION/TRAINING PROGRAM

## 2024-05-20 PROCEDURE — 99214 OFFICE O/P EST MOD 30 MIN: CPT | Performed by: STUDENT IN AN ORGANIZED HEALTH CARE EDUCATION/TRAINING PROGRAM

## 2024-05-20 ASSESSMENT — FIBROSIS 4 INDEX: FIB4 SCORE: 0.89

## 2024-05-20 NOTE — PROGRESS NOTES
"Subjective:     CC: Diabetes, hyperlipidemia, anxiety and depression, iron deficiency    HPI:   Fredis presents today with    Problem   Iron Deficiency Anemia    Chronic condition. He has seen GI and had EGD/colonoscopy 11/2022 which were overall normal.  May be secondary to chronic hemorrhoids.  Recent labs were WNL.  He takes daily iron supplementation.      Type 2 Diabetes Mellitus Without Complication, Without Long-Term Current Use of Insulin (Hcc)    Chronic condition, currently well-controlled with metformin 1000 mg twice daily.  Up-to-date on diabetic screenings.  A1c 6.0 today.     Hyperlipidemia Associated With Type 2 Diabetes Mellitus (Hcc)    Chronic condition. Current regimen: atorvastatin 10mg qhs, fenofibrate 145mg daily         Anxiety and Depression    Chronic condition.  Well-controlled.  Current regimen: escitalopram 20mg daily, nadolol 20mg daily         ROS:  ROS    Objective:     Exam:  /64   Pulse 68   Temp 36.3 °C (97.3 °F)   Ht 1.778 m (5' 10\")   Wt 98 kg (216 lb)   SpO2 99%   BMI 30.99 kg/m²  Body mass index is 30.99 kg/m².    Physical Exam  Vitals reviewed.   Constitutional:       General: He is not in acute distress.     Appearance: He is not toxic-appearing.   HENT:      Head: Normocephalic and atraumatic.      Right Ear: External ear normal.      Left Ear: External ear normal.   Eyes:      General:         Right eye: No discharge.         Left eye: No discharge.      Extraocular Movements: Extraocular movements intact.      Conjunctiva/sclera: Conjunctivae normal.   Cardiovascular:      Rate and Rhythm: Normal rate and regular rhythm.      Heart sounds: Normal heart sounds. No murmur heard.  Pulmonary:      Effort: Pulmonary effort is normal. No respiratory distress.      Breath sounds: Normal breath sounds. No wheezing or rales.   Skin:     General: Skin is warm and dry.   Neurological:      Mental Status: He is alert.   Psychiatric:         Mood and Affect: Mood normal.       "   Behavior: Behavior normal.         Thought Content: Thought content normal.         Judgment: Judgment normal.           Assessment & Plan:     48 y.o. male with the following -     1. Type 2 diabetes mellitus without complication, without long-term current use of insulin (HCC)  Well-controlled today, A1c 6.0.  Recheck labs in 6 months, continue metformin at current dosing  - POCT  A1C  - HEMOGLOBIN A1C; Future  - MICROALBUMIN CREAT RATIO URINE; Future    2. Anxiety and depression  Chronic condition, well-controlled with Lexapro and Adderall, continue    3. Hyperlipidemia associated with type 2 diabetes mellitus (HCC)  Chronic condition, recheck labs and continue statin at current dosing, continue fenofibrate at current dose at  - Lipid Profile; Future  - Comp Metabolic Panel; Future    4. Other iron deficiency anemia  Recheck labs  - CBC WITH DIFFERENTIAL; Future    5. Screening for thyroid disorder  - TSH WITH REFLEX TO FT4; Future      No follow-ups on file.    Please note that this dictation was created using voice recognition software. I have made every reasonable attempt to correct obvious errors, but I expect that there are errors of grammar and possibly content that I did not discover before finalizing the note.

## 2024-06-24 ENCOUNTER — OFFICE VISIT (OUTPATIENT)
Dept: URGENT CARE | Facility: PHYSICIAN GROUP | Age: 49
End: 2024-06-24
Payer: COMMERCIAL

## 2024-06-24 ENCOUNTER — APPOINTMENT (OUTPATIENT)
Dept: URGENT CARE | Facility: CLINIC | Age: 49
End: 2024-06-24
Payer: COMMERCIAL

## 2024-06-24 VITALS
TEMPERATURE: 97.9 F | BODY MASS INDEX: 30.48 KG/M2 | HEIGHT: 70 IN | DIASTOLIC BLOOD PRESSURE: 76 MMHG | WEIGHT: 212.9 LBS | OXYGEN SATURATION: 97 % | RESPIRATION RATE: 17 BRPM | SYSTOLIC BLOOD PRESSURE: 112 MMHG | HEART RATE: 67 BPM

## 2024-06-24 DIAGNOSIS — J02.9 PHARYNGITIS, UNSPECIFIED ETIOLOGY: ICD-10-CM

## 2024-06-24 LAB
FLUAV RNA SPEC QL NAA+PROBE: NEGATIVE
FLUBV RNA SPEC QL NAA+PROBE: NEGATIVE
RSV RNA SPEC QL NAA+PROBE: NEGATIVE
S PYO DNA SPEC NAA+PROBE: NOT DETECTED
SARS-COV-2 RNA RESP QL NAA+PROBE: NEGATIVE

## 2024-06-24 PROCEDURE — 99213 OFFICE O/P EST LOW 20 MIN: CPT

## 2024-06-24 PROCEDURE — 87651 STREP A DNA AMP PROBE: CPT

## 2024-06-24 PROCEDURE — 0241U POCT CEPHEID COV-2, FLU A/B, RSV - PCR: CPT

## 2024-06-24 RX ORDER — LIDOCAINE HYDROCHLORIDE 20 MG/ML
15 SOLUTION OROPHARYNGEAL PRN
Qty: 100 ML | Refills: 0 | Status: SHIPPED | OUTPATIENT
Start: 2024-06-24

## 2024-06-24 ASSESSMENT — ENCOUNTER SYMPTOMS
NECK PAIN: 0
SORE THROAT: 1
CHILLS: 0
EYE DISCHARGE: 0
EYE PAIN: 0
COUGH: 1
SINUS PAIN: 1
HEADACHES: 1
BACK PAIN: 0
WEIGHT LOSS: 0
FEVER: 0
MYALGIAS: 0
SHORTNESS OF BREATH: 0
SWOLLEN GLANDS: 1

## 2024-06-24 ASSESSMENT — FIBROSIS 4 INDEX: FIB4 SCORE: 0.89

## 2024-06-24 NOTE — PROGRESS NOTES
"Subjective:   Fredis Perry is a 48 y.o. male who presents for Sore Throat (Body aches, tired, x 4 days.)        Patient presents with symptoms of scratchy painful throat, intermittent nonproductive cough, nasal and sinus congestion for the past 4 days.  Denies any positive sick contacts or anyone in his home with similar symptoms.  Denies any history of strep throat.  Endorses chills, body aches, subjective fever.  No dyspnea, shortness of breath, or chest pain.    Pharyngitis   This is a new problem. The current episode started in the past 7 days. The problem has been rapidly worsening. Neither side of throat is experiencing more pain than the other. There has been no fever. The pain is at a severity of 7/10. The pain is mild. Associated symptoms include congestion, coughing, headaches, a plugged ear sensation and swollen glands. Pertinent negatives include no ear pain, neck pain or shortness of breath. He has had no exposure to strep or mono. He has tried NSAIDs for the symptoms. The treatment provided mild relief.       Review of Systems   Constitutional:  Negative for chills, fever, malaise/fatigue and weight loss.   HENT:  Positive for congestion, sinus pain and sore throat. Negative for ear pain.    Eyes:  Negative for pain and discharge.   Respiratory:  Positive for cough. Negative for shortness of breath.    Cardiovascular:  Negative for chest pain.   Musculoskeletal:  Negative for back pain, joint pain, myalgias and neck pain.   Skin:  Negative for rash.   Neurological:  Positive for headaches.       Medications, Allergies, and current problem list reviewed today in Epic.     Objective:     /76   Pulse 67   Temp 36.6 °C (97.9 °F) (Temporal)   Resp 17   Ht 1.778 m (5' 10\")   Wt 96.6 kg (212 lb 14.4 oz)   SpO2 97%     Physical Exam  Constitutional:       General: He is not in acute distress.     Appearance: Normal appearance.   HENT:      Head: Normocephalic.      Right Ear: Tympanic " membrane, ear canal and external ear normal.      Left Ear: Tympanic membrane, ear canal and external ear normal.      Nose: Congestion present. No rhinorrhea.      Mouth/Throat:      Mouth: Mucous membranes are moist.      Pharynx: Oropharyngeal exudate and posterior oropharyngeal erythema present.   Eyes:      Conjunctiva/sclera: Conjunctivae normal.   Cardiovascular:      Rate and Rhythm: Normal rate.   Pulmonary:      Effort: Pulmonary effort is normal. No respiratory distress.      Breath sounds: Normal breath sounds. No stridor. No wheezing, rhonchi or rales.   Chest:      Chest wall: No tenderness.   Musculoskeletal:      Cervical back: Normal range of motion. No tenderness.   Lymphadenopathy:      Cervical: No cervical adenopathy.   Neurological:      Mental Status: He is alert.         Assessment/Plan:     Diagnosis and associated orders:     1. Pharyngitis, unspecified etiology  POCT CoV-2, Flu A/B, RSV by PCR    lidocaine (XYLOCAINE) 2 % Solution    POCT CEPHEID GROUP A STREP - PCR         Comments/MDM:     Due to sudden onset and severity of patient's symptoms, high suspicion for streptococcal pharyngitis.  Patient also does endorse some Chest congestion and mild intermittent cough, viral panel to rule out viral etiology.  Patient's throat is very erythematous and there are some exudates noted on his tonsils.  Will give viscous lidocaine swish and spit for pain relief.  Due to testing time, patient will be swabbed and discharged.  Discussed with patient treatment options depending on results of testing.  Will call patient and update if any changes to plan of care are needed.  Viral panel negative  Strep swab negative  Increase hydration and fluids with low sugar.  NSAIDs Tylenol for pain relief and fever reduction.  Any changes or worsening symptoms please return to clinic for further evaluation         Differential diagnosis, natural history, supportive care, and indications for immediate follow-up  discussed.    Advised the patient to follow-up with the primary care physician for recheck, reevaluation, and consideration of further management.    Please note that this dictation was created using voice recognition software. I have made a reasonable attempt to correct obvious errors, but I expect that there are errors of grammar and possibly content that I did not discover before finalizing the note.    This note was electronically signed by EVELYN Sorenson

## 2024-06-24 NOTE — LETTER
June 24, 2024    To Whom It May Concern:         This is confirmation that Fredis Perry attended his scheduled appointment with EVELYN Sorenson on 6/24/24. May return to work 6/26/24 so long as he remains afebrile without use of fever reducers in the previous 24 hours.         If you have any questions please do not hesitate to call me at the phone number listed below.    Sincerely,          BRIDGET Sorenson.  [phone]

## 2024-06-24 NOTE — PATIENT INSTRUCTIONS
Strep Throat, Adult  Strep throat is an infection of the throat. It is caused by germs (bacteria). Strep throat is common during the cold months of the year. It mostly affects children who are 5-15 years old. However, people of all ages can get it at any time of the year. This infection spreads from person to person through coughing, sneezing, or having close contact.  What are the causes?  This condition is caused by the Streptococcus pyogenes germ.  What increases the risk?  You care for young children. Children are more likely to get strep throat and may spread it to others.  You go to crowded places. Germs can spread easily in such places.  You kiss or touch someone who has strep throat.  What are the signs or symptoms?  Fever or chills.  Redness, swelling, or pain in the tonsils or throat.  Pain or trouble when swallowing.  White or yellow spots on the tonsils or throat.  Tender glands in the neck and under the jaw.  Bad breath.  Red rash all over the body. This is rare.  How is this treated?  Medicines that kill germs (antibiotics).  Medicines that treat pain or fever. These include:  Ibuprofen or acetaminophen.  Aspirin, only for people who are over the age of 18.  Cough drops.  Throat sprays.  Follow these instructions at home:  Medicines    Take over-the-counter and prescription medicines only as told by your doctor.  Take your antibiotic medicine as told by your doctor. Do not stop taking the antibiotic even if you start to feel better.  Eating and drinking    If you have trouble swallowing, eat soft foods until your throat feels better.  Drink enough fluid to keep your pee (urine) pale yellow.  To help with pain, you may have:  Warm fluids, such as soup and tea.  Cold fluids, such as frozen desserts or popsicles.  General instructions  Rinse your mouth (gargle) with a salt-water mixture 3-4 times a day or as needed. To make a salt-water mixture, dissolve ½-1 tsp (3-6 g) of salt in 1 cup (237 mL) of warm  water.  Rest as much as you can.  Stay home from work or school until you have been taking antibiotics for 24 hours.  Do not smoke or use any products that contain nicotine or tobacco. If you need help quitting, ask your doctor.  Keep all follow-up visits.  How is this prevented?    Do not share food, drinking cups, or personal items. They can cause the germs to spread.  Wash your hands well with soap and water. Make sure that all people in your house wash their hands well.  Have family members tested if they have a fever or a sore throat. They may need an antibiotic if they have strep throat.  Contact a doctor if:  You have swelling in your neck that keeps getting bigger.  You get a rash, cough, or earache.  You cough up a thick fluid that is green, yellow-brown, or bloody.  You have pain that does not get better with medicine.  Your symptoms get worse instead of getting better.  You have a fever.  Get help right away if:  You vomit.  You have a very bad headache.  Your neck hurts or feels stiff.  You have chest pain or are short of breath.  You have drooling, very bad throat pain, or changes in your voice.  Your neck is swollen, or the skin gets red and tender.  Your mouth is dry, or you are peeing less than normal.  You keep feeling more tired or have trouble waking up.  Your joints are red or painful.  These symptoms may be an emergency. Do not wait to see if the symptoms will go away. Get help right away. Call your local emergency services (911 in the [place]  Summary  Strep throat is an infection of the throat. It is caused by germs (bacteria).  This infection can spread from person to person through coughing, sneezing, or having close contact.  Take your medicines, including antibiotics, as told by your doctor. Do not stop taking the antibiotic even if you start to feel better.  To prevent the spread of germs, wash your hands well with soap and water. Have others do the same. Do not share food, drinking cups,  or personal items.  Get help right away if you have a bad headache, chest pain, shortness of breath, a stiff or painful neck, or you vomit.  This information is not intended to replace advice given to you by your health care provider. Make sure you discuss any questions you have with your health care provider.  Document Revised: 04/12/2022 Document Reviewed: 04/12/2022  Elsevier Patient Education © 2023 [employer]

## 2024-06-26 ENCOUNTER — APPOINTMENT (OUTPATIENT)
Dept: TELEHEALTH | Facility: TELEMEDICINE | Age: 49
End: 2024-06-26
Payer: COMMERCIAL

## 2024-07-09 ENCOUNTER — PATIENT MESSAGE (OUTPATIENT)
Dept: MEDICAL GROUP | Facility: MEDICAL CENTER | Age: 49
End: 2024-07-09

## 2024-07-09 DIAGNOSIS — Z00.00 PREVENTATIVE HEALTH CARE: ICD-10-CM

## 2024-08-08 ENCOUNTER — PATIENT MESSAGE (OUTPATIENT)
Dept: MEDICAL GROUP | Facility: MEDICAL CENTER | Age: 49
End: 2024-08-08

## 2024-09-25 ENCOUNTER — HOSPITAL ENCOUNTER (EMERGENCY)
Facility: MEDICAL CENTER | Age: 49
End: 2024-09-25
Attending: EMERGENCY MEDICINE
Payer: COMMERCIAL

## 2024-09-25 ENCOUNTER — OFFICE VISIT (OUTPATIENT)
Dept: URGENT CARE | Facility: PHYSICIAN GROUP | Age: 49
End: 2024-09-25
Payer: COMMERCIAL

## 2024-09-25 ENCOUNTER — APPOINTMENT (OUTPATIENT)
Dept: RADIOLOGY | Facility: MEDICAL CENTER | Age: 49
End: 2024-09-25
Attending: EMERGENCY MEDICINE
Payer: COMMERCIAL

## 2024-09-25 ENCOUNTER — APPOINTMENT (OUTPATIENT)
Dept: URGENT CARE | Facility: PHYSICIAN GROUP | Age: 49
End: 2024-09-25
Payer: COMMERCIAL

## 2024-09-25 VITALS
OXYGEN SATURATION: 96 % | DIASTOLIC BLOOD PRESSURE: 64 MMHG | HEIGHT: 70 IN | HEART RATE: 58 BPM | WEIGHT: 213 LBS | SYSTOLIC BLOOD PRESSURE: 114 MMHG | BODY MASS INDEX: 30.49 KG/M2 | RESPIRATION RATE: 12 BRPM | TEMPERATURE: 98.1 F

## 2024-09-25 VITALS
OXYGEN SATURATION: 98 % | HEART RATE: 62 BPM | RESPIRATION RATE: 16 BRPM | HEIGHT: 71 IN | BODY MASS INDEX: 29.83 KG/M2 | DIASTOLIC BLOOD PRESSURE: 68 MMHG | SYSTOLIC BLOOD PRESSURE: 108 MMHG | TEMPERATURE: 98.1 F | WEIGHT: 213.1 LBS

## 2024-09-25 DIAGNOSIS — R10.11 RIGHT UPPER QUADRANT ABDOMINAL PAIN: ICD-10-CM

## 2024-09-25 DIAGNOSIS — R17 SCLERAL ICTERUS: ICD-10-CM

## 2024-09-25 DIAGNOSIS — R10.11 RUQ ABDOMINAL PAIN: ICD-10-CM

## 2024-09-25 DIAGNOSIS — K80.20 CALCULUS OF GALLBLADDER WITHOUT CHOLECYSTITIS WITHOUT OBSTRUCTION: ICD-10-CM

## 2024-09-25 LAB
ALBUMIN SERPL BCP-MCNC: 4.4 G/DL (ref 3.2–4.9)
ALBUMIN/GLOB SERPL: 1.6 G/DL
ALP SERPL-CCNC: 65 U/L (ref 30–99)
ALT SERPL-CCNC: 47 U/L (ref 2–50)
ANION GAP SERPL CALC-SCNC: 16 MMOL/L (ref 7–16)
AST SERPL-CCNC: 34 U/L (ref 12–45)
BASOPHILS # BLD AUTO: 0.8 % (ref 0–1.8)
BASOPHILS # BLD: 0.05 K/UL (ref 0–0.12)
BILIRUB SERPL-MCNC: 0.5 MG/DL (ref 0.1–1.5)
BUN SERPL-MCNC: 12 MG/DL (ref 8–22)
CALCIUM ALBUM COR SERPL-MCNC: 9 MG/DL (ref 8.5–10.5)
CALCIUM SERPL-MCNC: 9.3 MG/DL (ref 8.4–10.2)
CHLORIDE SERPL-SCNC: 103 MMOL/L (ref 96–112)
CO2 SERPL-SCNC: 22 MMOL/L (ref 20–33)
CREAT SERPL-MCNC: 1.08 MG/DL (ref 0.5–1.4)
EOSINOPHIL # BLD AUTO: 0.1 K/UL (ref 0–0.51)
EOSINOPHIL NFR BLD: 1.7 % (ref 0–6.9)
ERYTHROCYTE [DISTWIDTH] IN BLOOD BY AUTOMATED COUNT: 40 FL (ref 35.9–50)
GFR SERPLBLD CREATININE-BSD FMLA CKD-EPI: 85 ML/MIN/1.73 M 2
GLOBULIN SER CALC-MCNC: 2.8 G/DL (ref 1.9–3.5)
GLUCOSE SERPL-MCNC: 110 MG/DL (ref 65–99)
HCT VFR BLD AUTO: 46.5 % (ref 42–52)
HGB BLD-MCNC: 15.3 G/DL (ref 14–18)
IMM GRANULOCYTES # BLD AUTO: 0.01 K/UL (ref 0–0.11)
IMM GRANULOCYTES NFR BLD AUTO: 0.2 % (ref 0–0.9)
LIPASE SERPL-CCNC: 31 U/L (ref 11–82)
LYMPHOCYTES # BLD AUTO: 1.39 K/UL (ref 1–4.8)
LYMPHOCYTES NFR BLD: 23.6 % (ref 22–41)
MCH RBC QN AUTO: 27.3 PG (ref 27–33)
MCHC RBC AUTO-ENTMCNC: 32.9 G/DL (ref 32.3–36.5)
MCV RBC AUTO: 83 FL (ref 81.4–97.8)
MONOCYTES # BLD AUTO: 0.41 K/UL (ref 0–0.85)
MONOCYTES NFR BLD AUTO: 7 % (ref 0–13.4)
NEUTROPHILS # BLD AUTO: 3.93 K/UL (ref 1.82–7.42)
NEUTROPHILS NFR BLD: 66.7 % (ref 44–72)
NRBC # BLD AUTO: 0 K/UL
NRBC BLD-RTO: 0 /100 WBC (ref 0–0.2)
PLATELET # BLD AUTO: 298 K/UL (ref 164–446)
PMV BLD AUTO: 10.3 FL (ref 9–12.9)
POTASSIUM SERPL-SCNC: 3.9 MMOL/L (ref 3.6–5.5)
PROT SERPL-MCNC: 7.2 G/DL (ref 6–8.2)
RBC # BLD AUTO: 5.6 M/UL (ref 4.7–6.1)
SODIUM SERPL-SCNC: 141 MMOL/L (ref 135–145)
WBC # BLD AUTO: 5.9 K/UL (ref 4.8–10.8)

## 2024-09-25 PROCEDURE — 80053 COMPREHEN METABOLIC PANEL: CPT

## 2024-09-25 PROCEDURE — 85025 COMPLETE CBC W/AUTO DIFF WBC: CPT

## 2024-09-25 PROCEDURE — 99284 EMERGENCY DEPT VISIT MOD MDM: CPT

## 2024-09-25 PROCEDURE — 96375 TX/PRO/DX INJ NEW DRUG ADDON: CPT

## 2024-09-25 PROCEDURE — 36415 COLL VENOUS BLD VENIPUNCTURE: CPT

## 2024-09-25 PROCEDURE — 700111 HCHG RX REV CODE 636 W/ 250 OVERRIDE (IP): Mod: JZ | Performed by: EMERGENCY MEDICINE

## 2024-09-25 PROCEDURE — 96374 THER/PROPH/DIAG INJ IV PUSH: CPT

## 2024-09-25 PROCEDURE — 76705 ECHO EXAM OF ABDOMEN: CPT

## 2024-09-25 PROCEDURE — 83690 ASSAY OF LIPASE: CPT

## 2024-09-25 PROCEDURE — 94760 N-INVAS EAR/PLS OXIMETRY 1: CPT

## 2024-09-25 RX ORDER — ONDANSETRON 2 MG/ML
4 INJECTION INTRAMUSCULAR; INTRAVENOUS ONCE
Status: COMPLETED | OUTPATIENT
Start: 2024-09-25 | End: 2024-09-25

## 2024-09-25 RX ORDER — IBUPROFEN 200 MG
200 TABLET ORAL EVERY 6 HOURS PRN
COMMUNITY

## 2024-09-25 RX ORDER — NAPROXEN SODIUM 220 MG
220 TABLET ORAL 2 TIMES DAILY PRN
COMMUNITY

## 2024-09-25 RX ORDER — HYDROMORPHONE HYDROCHLORIDE 1 MG/ML
0.5 INJECTION, SOLUTION INTRAMUSCULAR; INTRAVENOUS; SUBCUTANEOUS ONCE
Status: COMPLETED | OUTPATIENT
Start: 2024-09-25 | End: 2024-09-25

## 2024-09-25 RX ADMIN — ONDANSETRON 4 MG: 2 INJECTION INTRAMUSCULAR; INTRAVENOUS at 10:40

## 2024-09-25 RX ADMIN — HYDROMORPHONE HYDROCHLORIDE 0.5 MG: 1 INJECTION, SOLUTION INTRAMUSCULAR; INTRAVENOUS; SUBCUTANEOUS at 10:40

## 2024-09-25 ASSESSMENT — FIBROSIS 4 INDEX
FIB4 SCORE: 0.89
FIB4 SCORE: 0.89

## 2024-09-25 NOTE — ED PROVIDER NOTES
ER Provider Note    Scribed for Freddie Lowe M.D. by Rodney Bond. 9/25/2024   10:24 AM    Primary Care Provider: Analy Lau M.D.    CHIEF COMPLAINT  Chief Complaint   Patient presents with    Abdominal Pain     RUQ; Hx of Gallstones    Shoulder Pain     R Shoulder pain @ times     EXTERNAL RECORDS REVIEWED  Patient has a history of cholelithiasis.  Has not had a cholecystectomy.    HPI/ROS  LIMITATION TO HISTORY   Select: : None  OUTSIDE HISTORIAN(S):  Significant other Patient's wife is present at bedside for support today.     Fredis Perry is a 48 y.o. male who presents to the ED for evaluation of worsening right upper abdominal pain onset two days ago. Patient reports that his abdominal pain is exacerbated when eating and has been constant for the past day. His pain radiates into his rib cage, and he has no other complaints at this time. He has a history of gallstones, and reports that his pain felt similar at that time.    PAST MEDICAL HISTORY  Past Medical History:   Diagnosis Date    Anxiety     Depression     GERD (gastroesophageal reflux disease)     High cholesterol     Hypertension     Indigestion        SURGICAL HISTORY  Past Surgical History:   Procedure Laterality Date    VENTRAL HERNIA REPAIR N/A 9/1/2020    Procedure: REPAIR, HERNIA, VENTRAL-INCARCERATED, POSSIBLE MESH;  Surgeon: Yahir Cunningham M.D.;  Location: SURGERY SAME DAY [facility];  Service: General    TONSILLECTOMY         FAMILY HISTORY  Family History   Problem Relation Age of Onset    Psychiatric Illness Mother     Heart Disease Father     Hypertension Father     Hyperlipidemia Father     Alcohol/Drug Father     Diabetes Maternal Grandmother     Heart Disease Maternal Grandfather     Stroke Maternal Grandfather        SOCIAL HISTORY   reports that he has never smoked. He has never used smokeless tobacco. He reports that he does not currently use alcohol. He reports that he does not currently use drugs.    CURRENT  "MEDICATIONS  Previous Medications    ATORVASTATIN (LIPITOR) 10 MG TAB    Take 1 Tablet by mouth every evening.    CHOLECALCIFEROL (D3 PO)    Take 1 Capsule by mouth every day.    ESCITALOPRAM (LEXAPRO) 20 MG TABLET    TAKE ONE TABLET BY MOUTH DAILY    FENOFIBRATE (TRICOR) 145 MG TAB    TAKE ONE TABLET BY MOUTH DAILY    FERROUS SULFATE (IRON PO)    Take 1 Tablet by mouth every day. (OTC)    IBUPROFEN (MOTRIN) 200 MG TAB    Take 200 mg by mouth every 6 hours as needed. Indications: Pain    METFORMIN (GLUCOPHAGE) 500 MG TAB    TAKE TWO TABLETS BY MOUTH TWICE A DAY WITH MEALS    NADOLOL (CORGARD) 20 MG TAB    Take 1 Tablet by mouth every day.    NAPROXEN (ALEVE) 220 MG TABLET    Take 220 mg by mouth 2 times a day as needed. Indications: Pain    OMEPRAZOLE (PRILOSEC) 20 MG DELAYED-RELEASE CAPSULE    Take 1 Cap by mouth every day.       ALLERGIES  No Known Allergies     PHYSICAL EXAM  /73   Pulse 62   Ht 1.778 m (5' 10\")   Wt 96.6 kg (213 lb)   SpO2 99%   BMI 30.56 kg/m²    Nursing note and vitals reviewed.  Constitutional: Well-developed and well-nourished. No distress.   HENT: Head is normocephalic and atraumatic. Oropharynx is clear and moist without exudate or erythema.   Eyes: Pupils are equal, round, and reactive to light. Conjunctiva are normal.   Cardiovascular: Normal rate and regular rhythm. No murmur heard. Normal radial pulses.  Pulmonary/Chest: Breath sounds normal. No wheezes or rales.   Abdominal: Soft, Tenderness to the right upper quadrant, No distention    Musculoskeletal: Extremities exhibit normal range of motion without edema or tenderness.   Neurological: Awake, alert and oriented to person, place, and time. No focal deficits noted.  Skin: Skin is warm and dry. No rash.   Psychiatric: Normal mood and affect. Appropriate for clinical situation    DIAGNOSTIC STUDIES    Labs:   Results for orders placed or performed during the hospital encounter of 09/25/24   CBC WITH DIFFERENTIAL   Result " Value Ref Range    WBC 5.9 4.8 - 10.8 K/uL    RBC 5.60 4.70 - 6.10 M/uL    Hemoglobin 15.3 14.0 - 18.0 g/dL    Hematocrit 46.5 42.0 - 52.0 %    MCV 83.0 81.4 - 97.8 fL    MCH 27.3 27.0 - 33.0 pg    MCHC 32.9 32.3 - 36.5 g/dL    RDW 40.0 35.9 - 50.0 fL    Platelet Count 298 164 - 446 K/uL    MPV 10.3 9.0 - 12.9 fL    Neutrophils-Polys 66.70 44.00 - 72.00 %    Lymphocytes 23.60 22.00 - 41.00 %    Monocytes 7.00 0.00 - 13.40 %    Eosinophils 1.70 0.00 - 6.90 %    Basophils 0.80 0.00 - 1.80 %    Immature Granulocytes 0.20 0.00 - 0.90 %    Nucleated RBC 0.00 0.00 - 0.20 /100 WBC    Neutrophils (Absolute) 3.93 1.82 - 7.42 K/uL    Lymphs (Absolute) 1.39 1.00 - 4.80 K/uL    Monos (Absolute) 0.41 0.00 - 0.85 K/uL    Eos (Absolute) 0.10 0.00 - 0.51 K/uL    Baso (Absolute) 0.05 0.00 - 0.12 K/uL    Immature Granulocytes (abs) 0.01 0.00 - 0.11 K/uL    NRBC (Absolute) 0.00 K/uL   COMP METABOLIC PANEL   Result Value Ref Range    Sodium 141 135 - 145 mmol/L    Potassium 3.9 3.6 - 5.5 mmol/L    Chloride 103 96 - 112 mmol/L    Co2 22 20 - 33 mmol/L    Anion Gap 16.0 7.0 - 16.0    Glucose 110 (H) 65 - 99 mg/dL    Bun 12 8 - 22 mg/dL    Creatinine 1.08 0.50 - 1.40 mg/dL    Calcium 9.3 8.4 - 10.2 mg/dL    Correct Calcium 9.0 8.5 - 10.5 mg/dL    AST(SGOT) 34 12 - 45 U/L    ALT(SGPT) 47 2 - 50 U/L    Alkaline Phosphatase 65 30 - 99 U/L    Total Bilirubin 0.5 0.1 - 1.5 mg/dL    Albumin 4.4 3.2 - 4.9 g/dL    Total Protein 7.2 6.0 - 8.2 g/dL    Globulin 2.8 1.9 - 3.5 g/dL    A-G Ratio 1.6 g/dL   LIPASE   Result Value Ref Range    Lipase 31 11 - 82 U/L   ESTIMATED GFR   Result Value Ref Range    GFR (CKD-EPI) 85 >60 mL/min/1.73 m 2       Radiology:   This attending emergency physician has independently interpreted the diagnostic imaging associated with this visit and is awaiting the final reading from the radiologist.   Preliminary interpretation is a follows: Cholelithiasis noted.  No pericholecystic fluid or wall thickening  noted.    Radiologist interpretation:   US-RUQ   Final Result         1. Fatty infiltration of the liver.   2. Cholelithiasis without definite evidence for acute cholecystitis. There may also be a small gallbladder wall polyp.   3. Right renal cyst.           INITIAL ASSESSMENT AND PLAN    10:24 AM - Patient was evaluated at bedside. He presents today with right upper quadrant pain that is consistent with his history of gallstones. I explained that he will likely require admission today and he agrees to this plan of care. He would like pain medication while he is evaluated int he ED. Ordered for Lipase, CMP, CBC with differential, and US-RUQ to evaluate. The patient will be medicated with Zofran 4 mg and Dilaudid 0.5 mg for his symptoms. Patient verbalizes understanding and support with my plan of care.  Differential diagnoses include but not limited to: Cholelithiasis vs. Cholecystitis.       11:43 AM - I reevaluated the patient at bedside. The patient informs me they feel mildly improved following medication administration. I discussed the patient's diagnostic study results which show chololithiasis but no obsturciton or cholecystitis. I discussed plan for discharge and follow up as outlined below. The patient is stable for discharge at this time and will return for any new or worsening symptoms. Patient verbalizes understanding and support with my plan for discharge.       COURSE AND MEDICAL DECISION MAKING      The patient was treated with Zofran for nausea.  Placed on a cardiac monitor to monitor for any arrhythmia associated with Zofran and QT prolongation.    The patient was treated with IV narcotics for pain control.  Placed on cardiac and blood pressure monitor to monitor for associated hypotension.  Also placed on pulse oximetry to monitor for hypoxia associated with medication administration/side effect.      DISPOSITION AND DISCUSSIONS    I have discussed management of the patient with the following  physicians and DUSTIN's:  None.     Discussion of management with other [facility] or appropriate source(s): None     Escalation of care considered, and ultimately not performed: acute inpatient care management, however at this time, the patient is most appropriate for outpatient management.  However, the patient is pain-free.  Tolerating orals.  Ultrasound and laboratory studies are reassuring.  Most consistent with symptomatic cholelithiasis.  Will follow-up as an outpatient.  Discussed gallbladder eating plan with the patient and his significant other.    Barriers to care at this time, including but not limited to:  There are none known at this time .     The patient will return for new or worsening symptoms and is stable at the time of discharge.    DISPOSITION:  Patient will be discharged home in stable condition.    FOLLOW UP:  Maurilio Cox M.D.  [address] [address] [phone]    Schedule an appointment as soon as possible for a visit       [facility], Emergency Dept  [address]  [facility] [address] [phone]    If symptoms worsen      OUTPATIENT MEDICATIONS:  New Prescriptions    No medications on file        FINAL DIAGNOSIS  1. Right upper quadrant abdominal pain    2. Calculus of gallbladder without cholecystitis without obstruction         Rodney RENDON (Scribfidelina), am scribing for, and in the presence of, Freddie Lowe M.D..    Electronically signed by: Rodney Bond (Marlineibfidelina), 9/25/2024    Freddie RENDON M.D. personally performed the services described in this documentation, as scribed by Rodney Bond in my presence, and it is both accurate and complete.      The note accurately reflects work and decisions made by me.  Freddie Lowe M.D.  9/25/2024  1:06 PM

## 2024-09-25 NOTE — ED NOTES
Pt ambulatory to room 6 with his wife.  Pt unable to provide urine sample at this time.  Pt changing into a gown.

## 2024-09-25 NOTE — ED TRIAGE NOTES
Fredis Perry  48 y.o.  Chief Complaint   Patient presents with    Abdominal Pain     RUQ; Hx of Gallstones    Shoulder Pain     R Shoulder pain @ times     Pt to ER with above complaints.  Pt diagnosed with gallstones in the past and did not f/u with a surgeon.  Pt has been experiencing pain for the last couple weeks with the pain worsening over the last couple days.  Pt reports increased pain after eating.  Pt currently rates his pain 2/10.

## 2024-09-25 NOTE — ED NOTES
Discharge instructions given to pt with verbalized understanding.  Pt ambulatory out of the ER with his wife.

## 2024-09-25 NOTE — PROGRESS NOTES
Verbal consent was acquired by the patient to use [employer] ambient listening note generation during this visit     Date: 09/25/24        Chief Complaint   Patient presents with    Pain     R. Shoulder blade pain, RUQ pain, bloating after eating, pt currently has gallstones x2 weeks  Taking GasX for bloating and works minimally          History of Present Illness  The patient is a 48-year-old male who presents for evaluation of right upper quadrant pain and shoulder pain.    He has been experiencing pain in the right upper quadrant and shoulder for the past 2 to 3 weeks, which has worsened over the past few days. The pain is localized under his sternum and ribs. He suspects the pain may be related to his gallbladder. He also reports bloating, which he initially attributed to indigestion or gas, and has been managing with Beano and Gas-X. Initially, he thought the shoulder pain was due to heavy lifting at work, but it persisted even when he was not lifting anything, particularly in the area of the back shoulder blade. He occasionally experiences tingling and shakiness when the pain begins. He experiences discomfort after eating, despite trying to consume lighter foods like salads. The pain was more pronounced when he consumed fatty foods. He reports diarrhea without constipation but does not experience nausea or vomiting. He has hemorrhoids, which occasionally cause minor bleeding.He reports no fevers although admits to some bodyaches and rigor like symptoms.  He has had nothing to eat since last night.  He did take his prescribed medications with a small sip of water    He had a kidney stone about 6 months ago, and a subsequent CT scan revealed lung nodules and gallstones.. His next appointment with his regular doctor is scheduled for late November 2024. .         ROS:    As otherwise stated in HPI    Medical/SX/ Social History:  Reviewed per chart    Pertinent Medications:    Current Outpatient Medications on  File Prior to Visit   Medication Sig Dispense Refill    nadolol (CORGARD) 20 MG Tab Take 1 Tablet by mouth every day. 90 Tablet 3    atorvastatin (LIPITOR) 10 MG Tab Take 1 Tablet by mouth every evening. 90 Tablet 3    escitalopram (LEXAPRO) 20 MG tablet TAKE ONE TABLET BY MOUTH DAILY 90 Tablet 3    metFORMIN (GLUCOPHAGE) 500 MG Tab TAKE TWO TABLETS BY MOUTH TWICE A DAY WITH MEALS 360 Tablet 3    fenofibrate (TRICOR) 145 MG Tab TAKE ONE TABLET BY MOUTH DAILY 90 Tablet 3    Ferrous Sulfate (IRON PO) Take  by mouth.      vitamin D (CHOLECALCIFEROL) 1000 UNIT Tab Take 1,000 Units by mouth every day.      omeprazole (PRILOSEC) 20 MG delayed-release capsule Take 1 Cap by mouth every day. 30 Cap 0    lidocaine (XYLOCAINE) 2 % Solution Take 15 mL by mouth as needed for Throat/Mouth Pain. 100 mL 0     No current facility-administered medications on file prior to visit.        Allergies:    Patient has no known allergies.     Problem list, medications, and allergies reviewed by myself today in Epic     Physical Exam:    Vitals:    09/25/24 0832   BP: 108/68   Pulse: 62   Resp: 16   Temp: 36.7 °C (98.1 °F)   SpO2: 98%             Physical Exam  Constitutional:       General: He is not in acute distress.     Appearance: Normal appearance. He is ill-appearing. He is not toxic-appearing or diaphoretic.   HENT:      Head: Normocephalic and atraumatic.      Right Ear: Tympanic membrane, ear canal and external ear normal.      Left Ear: Tympanic membrane, ear canal and external ear normal.      Nose: Nose normal.      Mouth/Throat:      Lips: Pink.      Mouth: Mucous membranes are moist.   Eyes:      General: Lids are normal. Lids are everted, no foreign bodies appreciated. Gaze aligned appropriately. Scleral icterus (mild only seen with lower lid displacment) present.      Extraocular Movements: Extraocular movements intact.   Cardiovascular:      Rate and Rhythm: Regular rhythm. Tachycardia present.      Heart sounds: Normal  heart sounds.   Pulmonary:      Effort: Pulmonary effort is normal.      Breath sounds: Normal breath sounds and air entry.   Abdominal:      General: Bowel sounds are increased. There is distension.      Palpations: Abdomen is soft.      Tenderness: There is generalized abdominal tenderness and tenderness in the right upper quadrant. There is guarding (RUQ). There is no right CVA tenderness, left CVA tenderness or rebound. Negative signs include Aguilar's sign.      Hernia: No hernia is present.      Comments: Generalized abdominal tenderness with mild distention.  Area, significant pain into the right upper quadrant, guarding of the right upper quadrant.    Musculoskeletal:      Cervical back: Normal.      Thoracic back: Normal. No spasms or bony tenderness.      Lumbar back: Normal.   Skin:     General: Skin is warm.      Capillary Refill: Capillary refill takes less than 2 seconds.      Coloration: Skin is not jaundiced.   Neurological:      Mental Status: He is alert and oriented to person, place, and time.   Psychiatric:         Attention and Perception: Attention normal.         Mood and Affect: Mood is anxious.         Behavior: Behavior normal. Behavior is cooperative.          Medical Decision making and plan :  I personally reviewed prior external notes and test results pertinent to today's visit.    Pleasant 48 y.o. male presented clinic with:   Assessment & Plan  1.  Suspected cholecystitis, scleral icterus with known gallstones     The patient reports right upper quadrant pain radiating to the back and shoulder blade, worsening over the past few days. The pain is exacerbated by eating, including non-fatty foods. Physical examination revealed tenderness in the right upper quadrant and slight yellowing of the eyes, suggesting a potential gallstone blockage. The patient experiences shoulder pain, initially thought to be musculoskeletal from lifting heavy objects at work, but it occurs even without lifting.  The pain is more localized to the right side. Physical examination did not indicate musculoskeletal issues. The shoulder pain is likely referred pain from the gallbladder. No specific treatment for shoulder pain was recommended as it is expected to resolve with the treatment of the gallbladder issue. Differential diagnosis includes cholecystitis and gallstone-induced biliary obstruction. Given the severity of symptoms and the yellowing of the eyes,  He was directed to the emergency room for further evaluation.  Advised that emergency room workup is up to the ER provider and exam findings upon evaluation in the emergency department.  Did advise patient to remain n.p.o..     3. Health Maintenance.  The patient had a CT scan 6-8 months ago for a kidney stone, which incidentally found lung nodules and gallstones. Gallstones asymptomatic at that time. Follow-up with his regular doctor is scheduled for late November to monitor these findings.        Disposition:  High-level care via private car.  Transfer center called.     Voice Recognition Disclaimer:  Portions of this document were created using voice recognition software and [email or web address] technology provided by [employer]. The software does have a chance of producing errors of grammar and possibly content. I have made every reasonable attempt to correct obvious errors, but there may be errors of grammar and possibly content that I did not discover before finalizing the  documentation.    Analy Welch, [email or web address].

## 2024-10-08 ENCOUNTER — APPOINTMENT (OUTPATIENT)
Dept: ADMISSIONS | Facility: MEDICAL CENTER | Age: 49
End: 2024-10-08
Attending: SURGERY
Payer: COMMERCIAL

## 2024-10-11 ENCOUNTER — PRE-ADMISSION TESTING (OUTPATIENT)
Dept: ADMISSIONS | Facility: MEDICAL CENTER | Age: 49
End: 2024-10-11
Attending: SURGERY
Payer: COMMERCIAL

## 2024-10-31 ENCOUNTER — HOSPITAL ENCOUNTER (OUTPATIENT)
Facility: MEDICAL CENTER | Age: 49
End: 2024-10-31
Attending: SURGERY | Admitting: SURGERY
Payer: COMMERCIAL

## 2024-10-31 ENCOUNTER — ANESTHESIA EVENT (OUTPATIENT)
Dept: SURGERY | Facility: MEDICAL CENTER | Age: 49
End: 2024-10-31
Payer: COMMERCIAL

## 2024-10-31 ENCOUNTER — ANESTHESIA (OUTPATIENT)
Dept: SURGERY | Facility: MEDICAL CENTER | Age: 49
End: 2024-10-31
Payer: COMMERCIAL

## 2024-10-31 VITALS
RESPIRATION RATE: 15 BRPM | TEMPERATURE: 97.6 F | DIASTOLIC BLOOD PRESSURE: 62 MMHG | HEART RATE: 76 BPM | WEIGHT: 196.43 LBS | OXYGEN SATURATION: 97 % | BODY MASS INDEX: 28.12 KG/M2 | HEIGHT: 70 IN | SYSTOLIC BLOOD PRESSURE: 103 MMHG

## 2024-10-31 DIAGNOSIS — G89.18 POSTOPERATIVE PAIN: ICD-10-CM

## 2024-10-31 LAB
EKG IMPRESSION: NORMAL
GLUCOSE BLD STRIP.AUTO-MCNC: 110 MG/DL (ref 65–99)
PATHOLOGY CONSULT NOTE: NORMAL

## 2024-10-31 PROCEDURE — A9270 NON-COVERED ITEM OR SERVICE: HCPCS | Performed by: STUDENT IN AN ORGANIZED HEALTH CARE EDUCATION/TRAINING PROGRAM

## 2024-10-31 PROCEDURE — 160046 HCHG PACU - 1ST 60 MINS PHASE II: Performed by: SURGERY

## 2024-10-31 PROCEDURE — 93010 ELECTROCARDIOGRAM REPORT: CPT | Performed by: INTERNAL MEDICINE

## 2024-10-31 PROCEDURE — 160002 HCHG RECOVERY MINUTES (STAT): Performed by: SURGERY

## 2024-10-31 PROCEDURE — 160009 HCHG ANES TIME/MIN: Performed by: SURGERY

## 2024-10-31 PROCEDURE — 160025 RECOVERY II MINUTES (STATS): Performed by: SURGERY

## 2024-10-31 PROCEDURE — 88304 TISSUE EXAM BY PATHOLOGIST: CPT

## 2024-10-31 PROCEDURE — 82962 GLUCOSE BLOOD TEST: CPT

## 2024-10-31 PROCEDURE — 700111 HCHG RX REV CODE 636 W/ 250 OVERRIDE (IP): Mod: JZ | Performed by: STUDENT IN AN ORGANIZED HEALTH CARE EDUCATION/TRAINING PROGRAM

## 2024-10-31 PROCEDURE — 700105 HCHG RX REV CODE 258: Performed by: STUDENT IN AN ORGANIZED HEALTH CARE EDUCATION/TRAINING PROGRAM

## 2024-10-31 PROCEDURE — 93005 ELECTROCARDIOGRAM TRACING: CPT | Performed by: SURGERY

## 2024-10-31 PROCEDURE — 160029 HCHG SURGERY MINUTES - 1ST 30 MINS LEVEL 4: Performed by: SURGERY

## 2024-10-31 PROCEDURE — 700102 HCHG RX REV CODE 250 W/ 637 OVERRIDE(OP): Performed by: STUDENT IN AN ORGANIZED HEALTH CARE EDUCATION/TRAINING PROGRAM

## 2024-10-31 PROCEDURE — 160041 HCHG SURGERY MINUTES - EA ADDL 1 MIN LEVEL 4: Performed by: SURGERY

## 2024-10-31 PROCEDURE — 160035 HCHG PACU - 1ST 60 MINS PHASE I: Performed by: SURGERY

## 2024-10-31 PROCEDURE — 700101 HCHG RX REV CODE 250: Performed by: SURGERY

## 2024-10-31 PROCEDURE — 700111 HCHG RX REV CODE 636 W/ 250 OVERRIDE (IP): Performed by: STUDENT IN AN ORGANIZED HEALTH CARE EDUCATION/TRAINING PROGRAM

## 2024-10-31 PROCEDURE — 160048 HCHG OR STATISTICAL LEVEL 1-5: Performed by: SURGERY

## 2024-10-31 PROCEDURE — 160036 HCHG PACU - EA ADDL 30 MINS PHASE I: Performed by: SURGERY

## 2024-10-31 PROCEDURE — 700101 HCHG RX REV CODE 250: Performed by: STUDENT IN AN ORGANIZED HEALTH CARE EDUCATION/TRAINING PROGRAM

## 2024-10-31 RX ORDER — BUPIVACAINE HYDROCHLORIDE AND EPINEPHRINE 5; 5 MG/ML; UG/ML
INJECTION, SOLUTION EPIDURAL; INTRACAUDAL; PERINEURAL
Status: DISCONTINUED | OUTPATIENT
Start: 2024-10-31 | End: 2024-10-31 | Stop reason: HOSPADM

## 2024-10-31 RX ORDER — DIPHENHYDRAMINE HYDROCHLORIDE 50 MG/ML
12.5 INJECTION INTRAMUSCULAR; INTRAVENOUS
Status: DISCONTINUED | OUTPATIENT
Start: 2024-10-31 | End: 2024-10-31 | Stop reason: HOSPADM

## 2024-10-31 RX ORDER — HYDROMORPHONE HYDROCHLORIDE 1 MG/ML
0.2 INJECTION, SOLUTION INTRAMUSCULAR; INTRAVENOUS; SUBCUTANEOUS
Status: DISCONTINUED | OUTPATIENT
Start: 2024-10-31 | End: 2024-10-31 | Stop reason: HOSPADM

## 2024-10-31 RX ORDER — ONDANSETRON 2 MG/ML
4 INJECTION INTRAMUSCULAR; INTRAVENOUS
Status: DISCONTINUED | OUTPATIENT
Start: 2024-10-31 | End: 2024-10-31 | Stop reason: HOSPADM

## 2024-10-31 RX ORDER — OXYCODONE HCL 5 MG/5 ML
10 SOLUTION, ORAL ORAL
Status: COMPLETED | OUTPATIENT
Start: 2024-10-31 | End: 2024-10-31

## 2024-10-31 RX ORDER — ACETAMINOPHEN 325 MG/1
650 TABLET ORAL EVERY 6 HOURS
Qty: 60 TABLET | Refills: 0 | Status: SHIPPED | OUTPATIENT
Start: 2024-10-31

## 2024-10-31 RX ORDER — DEXAMETHASONE SODIUM PHOSPHATE 4 MG/ML
INJECTION, SOLUTION INTRA-ARTICULAR; INTRALESIONAL; INTRAMUSCULAR; INTRAVENOUS; SOFT TISSUE PRN
Status: DISCONTINUED | OUTPATIENT
Start: 2024-10-31 | End: 2024-10-31 | Stop reason: SURG

## 2024-10-31 RX ORDER — ONDANSETRON 2 MG/ML
INJECTION INTRAMUSCULAR; INTRAVENOUS PRN
Status: DISCONTINUED | OUTPATIENT
Start: 2024-10-31 | End: 2024-10-31 | Stop reason: SURG

## 2024-10-31 RX ORDER — IBUPROFEN 600 MG/1
600 TABLET, FILM COATED ORAL EVERY 6 HOURS
Qty: 45 TABLET | Refills: 0 | Status: SHIPPED | OUTPATIENT
Start: 2024-10-31

## 2024-10-31 RX ORDER — ROCURONIUM BROMIDE 10 MG/ML
INJECTION, SOLUTION INTRAVENOUS PRN
Status: DISCONTINUED | OUTPATIENT
Start: 2024-10-31 | End: 2024-10-31 | Stop reason: SURG

## 2024-10-31 RX ORDER — POLYETHYLENE GLYCOL 3350 17 G/17G
17 POWDER, FOR SOLUTION ORAL DAILY
Qty: 20 EACH | Refills: 0 | Status: SHIPPED | OUTPATIENT
Start: 2024-10-31

## 2024-10-31 RX ORDER — SODIUM CHLORIDE, SODIUM LACTATE, POTASSIUM CHLORIDE, CALCIUM CHLORIDE 600; 310; 30; 20 MG/100ML; MG/100ML; MG/100ML; MG/100ML
INJECTION, SOLUTION INTRAVENOUS
Status: DISCONTINUED | OUTPATIENT
Start: 2024-10-31 | End: 2024-10-31 | Stop reason: SURG

## 2024-10-31 RX ORDER — OXYCODONE HYDROCHLORIDE 5 MG/1
5 TABLET ORAL EVERY 4 HOURS PRN
Qty: 12 TABLET | Refills: 0 | Status: SHIPPED | OUTPATIENT
Start: 2024-10-31 | End: 2024-11-03

## 2024-10-31 RX ORDER — CEFAZOLIN SODIUM 1 G/3ML
INJECTION, POWDER, FOR SOLUTION INTRAMUSCULAR; INTRAVENOUS PRN
Status: DISCONTINUED | OUTPATIENT
Start: 2024-10-31 | End: 2024-10-31 | Stop reason: SURG

## 2024-10-31 RX ORDER — LIDOCAINE HYDROCHLORIDE 20 MG/ML
INJECTION, SOLUTION EPIDURAL; INFILTRATION; INTRACAUDAL; PERINEURAL PRN
Status: DISCONTINUED | OUTPATIENT
Start: 2024-10-31 | End: 2024-10-31 | Stop reason: SURG

## 2024-10-31 RX ORDER — SODIUM CHLORIDE, SODIUM LACTATE, POTASSIUM CHLORIDE, CALCIUM CHLORIDE 600; 310; 30; 20 MG/100ML; MG/100ML; MG/100ML; MG/100ML
INJECTION, SOLUTION INTRAVENOUS CONTINUOUS
Status: DISCONTINUED | OUTPATIENT
Start: 2024-10-31 | End: 2024-10-31 | Stop reason: HOSPADM

## 2024-10-31 RX ORDER — ACETAMINOPHEN 500 MG
500-1000 TABLET ORAL EVERY 6 HOURS PRN
COMMUNITY

## 2024-10-31 RX ORDER — KETOROLAC TROMETHAMINE 15 MG/ML
INJECTION, SOLUTION INTRAMUSCULAR; INTRAVENOUS PRN
Status: DISCONTINUED | OUTPATIENT
Start: 2024-10-31 | End: 2024-10-31 | Stop reason: SURG

## 2024-10-31 RX ORDER — HYDROMORPHONE HYDROCHLORIDE 1 MG/ML
0.1 INJECTION, SOLUTION INTRAMUSCULAR; INTRAVENOUS; SUBCUTANEOUS
Status: DISCONTINUED | OUTPATIENT
Start: 2024-10-31 | End: 2024-10-31 | Stop reason: HOSPADM

## 2024-10-31 RX ORDER — HALOPERIDOL 5 MG/ML
1 INJECTION INTRAMUSCULAR
Status: DISCONTINUED | OUTPATIENT
Start: 2024-10-31 | End: 2024-10-31 | Stop reason: HOSPADM

## 2024-10-31 RX ORDER — ACETAMINOPHEN 500 MG
1000 TABLET ORAL ONCE
Status: DISCONTINUED | OUTPATIENT
Start: 2024-10-31 | End: 2024-10-31 | Stop reason: HOSPADM

## 2024-10-31 RX ORDER — OXYCODONE HCL 5 MG/5 ML
5 SOLUTION, ORAL ORAL
Status: COMPLETED | OUTPATIENT
Start: 2024-10-31 | End: 2024-10-31

## 2024-10-31 RX ORDER — HYDROMORPHONE HYDROCHLORIDE 1 MG/ML
0.4 INJECTION, SOLUTION INTRAMUSCULAR; INTRAVENOUS; SUBCUTANEOUS
Status: DISCONTINUED | OUTPATIENT
Start: 2024-10-31 | End: 2024-10-31 | Stop reason: HOSPADM

## 2024-10-31 RX ORDER — MIDAZOLAM HYDROCHLORIDE 1 MG/ML
INJECTION INTRAMUSCULAR; INTRAVENOUS PRN
Status: DISCONTINUED | OUTPATIENT
Start: 2024-10-31 | End: 2024-10-31 | Stop reason: SURG

## 2024-10-31 RX ORDER — HYDROMORPHONE HYDROCHLORIDE 2 MG/ML
INJECTION, SOLUTION INTRAMUSCULAR; INTRAVENOUS; SUBCUTANEOUS PRN
Status: DISCONTINUED | OUTPATIENT
Start: 2024-10-31 | End: 2024-10-31 | Stop reason: SURG

## 2024-10-31 RX ORDER — MEPERIDINE HYDROCHLORIDE 25 MG/ML
12.5 INJECTION INTRAMUSCULAR; INTRAVENOUS; SUBCUTANEOUS
Status: DISCONTINUED | OUTPATIENT
Start: 2024-10-31 | End: 2024-10-31 | Stop reason: HOSPADM

## 2024-10-31 RX ADMIN — HYDROMORPHONE HYDROCHLORIDE 0.2 MG: 1 INJECTION, SOLUTION INTRAMUSCULAR; INTRAVENOUS; SUBCUTANEOUS at 12:59

## 2024-10-31 RX ADMIN — SUGAMMADEX 200 MG: 100 INJECTION, SOLUTION INTRAVENOUS at 12:00

## 2024-10-31 RX ADMIN — HYDROMORPHONE HYDROCHLORIDE 0.5 MG: 2 INJECTION INTRAMUSCULAR; INTRAVENOUS; SUBCUTANEOUS at 11:35

## 2024-10-31 RX ADMIN — KETOROLAC TROMETHAMINE 15 MG: 15 INJECTION, SOLUTION INTRAMUSCULAR; INTRAVENOUS at 12:00

## 2024-10-31 RX ADMIN — METHOCARBAMOL 1000 MG: 100 INJECTION, SOLUTION INTRAMUSCULAR; INTRAVENOUS at 13:11

## 2024-10-31 RX ADMIN — HYDROMORPHONE HYDROCHLORIDE 0.4 MG: 1 INJECTION, SOLUTION INTRAMUSCULAR; INTRAVENOUS; SUBCUTANEOUS at 12:54

## 2024-10-31 RX ADMIN — LIDOCAINE HYDROCHLORIDE 100 MG: 20 INJECTION, SOLUTION EPIDURAL; INFILTRATION; INTRACAUDAL at 11:38

## 2024-10-31 RX ADMIN — CEFAZOLIN 2 G: 1 INJECTION, POWDER, FOR SOLUTION INTRAMUSCULAR; INTRAVENOUS at 11:41

## 2024-10-31 RX ADMIN — ROCURONIUM BROMIDE 50 MG: 50 INJECTION, SOLUTION INTRAVENOUS at 11:38

## 2024-10-31 RX ADMIN — ONDANSETRON 4 MG: 2 INJECTION INTRAMUSCULAR; INTRAVENOUS at 12:00

## 2024-10-31 RX ADMIN — OXYCODONE HYDROCHLORIDE 10 MG: 5 SOLUTION ORAL at 12:19

## 2024-10-31 RX ADMIN — HYDROMORPHONE HYDROCHLORIDE 0.5 MG: 2 INJECTION INTRAMUSCULAR; INTRAVENOUS; SUBCUTANEOUS at 11:55

## 2024-10-31 RX ADMIN — PROPOFOL 200 MG: 10 INJECTION, EMULSION INTRAVENOUS at 11:38

## 2024-10-31 RX ADMIN — DEXAMETHASONE SODIUM PHOSPHATE 4 MG: 4 INJECTION INTRA-ARTICULAR; INTRALESIONAL; INTRAMUSCULAR; INTRAVENOUS; SOFT TISSUE at 11:38

## 2024-10-31 RX ADMIN — SODIUM CHLORIDE, POTASSIUM CHLORIDE, SODIUM LACTATE AND CALCIUM CHLORIDE: 600; 310; 30; 20 INJECTION, SOLUTION INTRAVENOUS at 11:33

## 2024-10-31 RX ADMIN — HYDROMORPHONE HYDROCHLORIDE 0.4 MG: 1 INJECTION, SOLUTION INTRAMUSCULAR; INTRAVENOUS; SUBCUTANEOUS at 12:49

## 2024-10-31 RX ADMIN — MIDAZOLAM HYDROCHLORIDE 2 MG: 2 INJECTION, SOLUTION INTRAMUSCULAR; INTRAVENOUS at 11:34

## 2024-10-31 RX ADMIN — FENTANYL CITRATE 50 MCG: 50 INJECTION, SOLUTION INTRAMUSCULAR; INTRAVENOUS at 12:35

## 2024-10-31 RX ADMIN — FENTANYL CITRATE 50 MCG: 50 INJECTION, SOLUTION INTRAMUSCULAR; INTRAVENOUS at 12:40

## 2024-10-31 ASSESSMENT — PAIN DESCRIPTION - PAIN TYPE
TYPE: ACUTE PAIN;SURGICAL PAIN

## 2024-10-31 ASSESSMENT — FIBROSIS 4 INDEX: FIB4 SCORE: 0.78

## 2024-11-30 ENCOUNTER — HOSPITAL ENCOUNTER (OUTPATIENT)
Dept: LAB | Facility: MEDICAL CENTER | Age: 49
End: 2024-11-30
Attending: STUDENT IN AN ORGANIZED HEALTH CARE EDUCATION/TRAINING PROGRAM
Payer: COMMERCIAL

## 2024-11-30 DIAGNOSIS — E11.9 TYPE 2 DIABETES MELLITUS WITHOUT COMPLICATION, WITHOUT LONG-TERM CURRENT USE OF INSULIN (HCC): ICD-10-CM

## 2024-11-30 DIAGNOSIS — Z13.29 SCREENING FOR THYROID DISORDER: ICD-10-CM

## 2024-11-30 DIAGNOSIS — Z00.00 PREVENTATIVE HEALTH CARE: ICD-10-CM

## 2024-11-30 DIAGNOSIS — E11.69 HYPERLIPIDEMIA ASSOCIATED WITH TYPE 2 DIABETES MELLITUS (HCC): ICD-10-CM

## 2024-11-30 DIAGNOSIS — E78.5 HYPERLIPIDEMIA ASSOCIATED WITH TYPE 2 DIABETES MELLITUS (HCC): ICD-10-CM

## 2024-11-30 DIAGNOSIS — D50.8 OTHER IRON DEFICIENCY ANEMIA: ICD-10-CM

## 2024-11-30 LAB
ALBUMIN SERPL BCP-MCNC: 4.7 G/DL (ref 3.2–4.9)
ALBUMIN/GLOB SERPL: 2 G/DL
ALP SERPL-CCNC: 73 U/L (ref 30–99)
ALT SERPL-CCNC: 40 U/L (ref 2–50)
ANION GAP SERPL CALC-SCNC: 11 MMOL/L (ref 7–16)
AST SERPL-CCNC: 32 U/L (ref 12–45)
BASOPHILS # BLD AUTO: 0.9 % (ref 0–1.8)
BASOPHILS # BLD: 0.04 K/UL (ref 0–0.12)
BILIRUB SERPL-MCNC: 0.4 MG/DL (ref 0.1–1.5)
BUN SERPL-MCNC: 11 MG/DL (ref 8–22)
CALCIUM ALBUM COR SERPL-MCNC: 8.8 MG/DL (ref 8.5–10.5)
CALCIUM SERPL-MCNC: 9.4 MG/DL (ref 8.5–10.5)
CHLORIDE SERPL-SCNC: 103 MMOL/L (ref 96–112)
CHOLEST SERPL-MCNC: 136 MG/DL (ref 100–199)
CO2 SERPL-SCNC: 25 MMOL/L (ref 20–33)
CREAT SERPL-MCNC: 1.04 MG/DL (ref 0.5–1.4)
CREAT UR-MCNC: 108.44 MG/DL
EOSINOPHIL # BLD AUTO: 0.16 K/UL (ref 0–0.51)
EOSINOPHIL NFR BLD: 3.7 % (ref 0–6.9)
ERYTHROCYTE [DISTWIDTH] IN BLOOD BY AUTOMATED COUNT: 38.7 FL (ref 35.9–50)
EST. AVERAGE GLUCOSE BLD GHB EST-MCNC: 120 MG/DL
GFR SERPLBLD CREATININE-BSD FMLA CKD-EPI: 89 ML/MIN/1.73 M 2
GLOBULIN SER CALC-MCNC: 2.4 G/DL (ref 1.9–3.5)
GLUCOSE SERPL-MCNC: 120 MG/DL (ref 65–99)
HBA1C MFR BLD: 5.8 % (ref 4–5.6)
HCT VFR BLD AUTO: 45 % (ref 42–52)
HDLC SERPL-MCNC: 30 MG/DL
HGB BLD-MCNC: 14.2 G/DL (ref 14–18)
IMM GRANULOCYTES # BLD AUTO: 0.01 K/UL (ref 0–0.11)
IMM GRANULOCYTES NFR BLD AUTO: 0.2 % (ref 0–0.9)
LDLC SERPL CALC-MCNC: 84 MG/DL
LYMPHOCYTES # BLD AUTO: 1.58 K/UL (ref 1–4.8)
LYMPHOCYTES NFR BLD: 36.2 % (ref 22–41)
MCH RBC QN AUTO: 26.3 PG (ref 27–33)
MCHC RBC AUTO-ENTMCNC: 31.6 G/DL (ref 32.3–36.5)
MCV RBC AUTO: 83.3 FL (ref 81.4–97.8)
MICROALBUMIN UR-MCNC: <1.2 MG/DL
MICROALBUMIN/CREAT UR: NORMAL MG/G (ref 0–30)
MONOCYTES # BLD AUTO: 0.4 K/UL (ref 0–0.85)
MONOCYTES NFR BLD AUTO: 9.2 % (ref 0–13.4)
NEUTROPHILS # BLD AUTO: 2.17 K/UL (ref 1.82–7.42)
NEUTROPHILS NFR BLD: 49.8 % (ref 44–72)
NRBC # BLD AUTO: 0 K/UL
NRBC BLD-RTO: 0 /100 WBC (ref 0–0.2)
PLATELET # BLD AUTO: 291 K/UL (ref 164–446)
PMV BLD AUTO: 11 FL (ref 9–12.9)
POTASSIUM SERPL-SCNC: 3.9 MMOL/L (ref 3.6–5.5)
PROT SERPL-MCNC: 7.1 G/DL (ref 6–8.2)
RBC # BLD AUTO: 5.4 M/UL (ref 4.7–6.1)
SODIUM SERPL-SCNC: 139 MMOL/L (ref 135–145)
TESTOST SERPL-MCNC: 272 NG/DL (ref 175–781)
TRIGL SERPL-MCNC: 110 MG/DL (ref 0–149)
TSH SERPL DL<=0.005 MIU/L-ACNC: 2.05 UIU/ML (ref 0.38–5.33)
WBC # BLD AUTO: 4.4 K/UL (ref 4.8–10.8)

## 2024-11-30 PROCEDURE — 80053 COMPREHEN METABOLIC PANEL: CPT

## 2024-11-30 PROCEDURE — 82043 UR ALBUMIN QUANTITATIVE: CPT

## 2024-11-30 PROCEDURE — 82570 ASSAY OF URINE CREATININE: CPT

## 2024-11-30 PROCEDURE — 83036 HEMOGLOBIN GLYCOSYLATED A1C: CPT

## 2024-11-30 PROCEDURE — 84443 ASSAY THYROID STIM HORMONE: CPT

## 2024-11-30 PROCEDURE — 36415 COLL VENOUS BLD VENIPUNCTURE: CPT

## 2024-11-30 PROCEDURE — 85025 COMPLETE CBC W/AUTO DIFF WBC: CPT

## 2024-11-30 PROCEDURE — 84403 ASSAY OF TOTAL TESTOSTERONE: CPT

## 2024-11-30 PROCEDURE — 80061 LIPID PANEL: CPT

## 2024-12-17 ENCOUNTER — OFFICE VISIT (OUTPATIENT)
Dept: MEDICAL GROUP | Facility: MEDICAL CENTER | Age: 49
End: 2024-12-17
Payer: COMMERCIAL

## 2024-12-17 VITALS
HEIGHT: 70 IN | OXYGEN SATURATION: 97 % | SYSTOLIC BLOOD PRESSURE: 92 MMHG | TEMPERATURE: 97.5 F | WEIGHT: 199.2 LBS | HEART RATE: 67 BPM | BODY MASS INDEX: 28.52 KG/M2 | DIASTOLIC BLOOD PRESSURE: 50 MMHG

## 2024-12-17 DIAGNOSIS — E11.9 TYPE 2 DIABETES MELLITUS WITHOUT COMPLICATION, WITHOUT LONG-TERM CURRENT USE OF INSULIN (HCC): ICD-10-CM

## 2024-12-17 DIAGNOSIS — R91.8 MULTIPLE LUNG NODULES: ICD-10-CM

## 2024-12-17 DIAGNOSIS — R79.89 LOW TESTOSTERONE: ICD-10-CM

## 2024-12-17 PROBLEM — Z00.00 PREVENTATIVE HEALTH CARE: Status: RESOLVED | Noted: 2022-04-18 | Resolved: 2024-12-17

## 2024-12-17 PROBLEM — G89.18 POSTOPERATIVE PAIN: Status: RESOLVED | Noted: 2024-10-31 | Resolved: 2024-12-17

## 2024-12-17 PROBLEM — E66.9 OBESITY (BMI 30-39.9): Status: RESOLVED | Noted: 2017-11-08 | Resolved: 2024-12-17

## 2024-12-17 PROBLEM — Z98.52 HISTORY OF VASECTOMY: Status: RESOLVED | Noted: 2018-07-16 | Resolved: 2024-12-17

## 2024-12-17 PROCEDURE — 3078F DIAST BP <80 MM HG: CPT | Performed by: PHYSICIAN ASSISTANT

## 2024-12-17 PROCEDURE — 3074F SYST BP LT 130 MM HG: CPT | Performed by: PHYSICIAN ASSISTANT

## 2024-12-17 PROCEDURE — 99214 OFFICE O/P EST MOD 30 MIN: CPT | Performed by: PHYSICIAN ASSISTANT

## 2024-12-17 RX ORDER — FENOFIBRATE 145 MG/1
145 TABLET, COATED ORAL DAILY
Qty: 90 TABLET | Refills: 3 | Status: SHIPPED | OUTPATIENT
Start: 2024-12-17

## 2024-12-17 ASSESSMENT — FIBROSIS 4 INDEX: FIB4 SCORE: 0.82

## 2024-12-17 ASSESSMENT — PATIENT HEALTH QUESTIONNAIRE - PHQ9: CLINICAL INTERPRETATION OF PHQ2 SCORE: 0

## 2024-12-17 NOTE — PROGRESS NOTES
Subjective:     History of Present Illness  The patient presents for a scheduled follow-up on some tests that he had done.    He is here to reschedule another CT scan of his lungs. He has a history of pulmonary nodules, identified approximately a year ago during a CT scan for kidney stones. At that time, the nodules were not deemed concerning, but Dr. Lau recommended a follow-up CT scan after a year. He has a past history of smoking but quit over 20 years ago.    He has diabetes, which is well-managed with metformin, as evidenced by his most recent A1c test results. He undergoes annual diabetic eye screenings with Dr. Ema Pascual, with the next appointment scheduled for 01/26/2025.    He has hemorrhoids and reflux. He is taking over-the-counter omeprazole.    He underwent a vasectomy 19 years ago, which was unsuccessful, as evidenced by the birth of his 6-year-old child. He did not opt for a second procedure, and his wife instead chose to have an IUD placed. He has experienced significant weight loss following gallbladder removal.    He is currently on atorvastatin and requires a refill of his TriCor prescription.    His testosterone level was low when checked for the first time.      Current medicines (including changes today)  Current Outpatient Medications   Medication Sig Dispense Refill    fenofibrate (TRICOR) 145 MG Tab Take 1 Tablet by mouth every day. 90 Tablet 3    Cholecalciferol (D3 PO) Take 1 Capsule by mouth every day.      nadolol (CORGARD) 20 MG Tab Take 1 Tablet by mouth every day. 90 Tablet 3    atorvastatin (LIPITOR) 10 MG Tab Take 1 Tablet by mouth every evening. 90 Tablet 3    escitalopram (LEXAPRO) 20 MG tablet TAKE ONE TABLET BY MOUTH DAILY (Patient taking differently: Take 20 mg by mouth every day.) 90 Tablet 3    metFORMIN (GLUCOPHAGE) 500 MG Tab TAKE TWO TABLETS BY MOUTH TWICE A DAY WITH MEALS (Patient taking differently: Take 1,000 mg by mouth 2 times a day.) 360 Tablet 3     "Ferrous Sulfate (IRON PO) Take 1 Tablet by mouth every 48 hours.      omeprazole (PRILOSEC) 20 MG delayed-release capsule Take 1 Cap by mouth every day. 30 Cap 0     No current facility-administered medications for this visit.     He  has a past medical history of Anxiety, Arthritis, Depression, Diabetes (HCC), GERD (gastroesophageal reflux disease), Heart burn, High cholesterol, Hypertension, and Indigestion.    ROS   No chest pain, no shortness of breath, no abdominal pain  Positive ROS as per HPI.  All other systems reviewed and are negative.     Objective:     BP 92/50 (BP Location: Right arm, Patient Position: Sitting, BP Cuff Size: Adult)   Pulse 67   Temp 36.4 °C (97.5 °F) (Temporal)   Ht 1.778 m (5' 10\")   Wt 90.4 kg (199 lb 3.2 oz)   SpO2 97%  Body mass index is 28.58 kg/m².   Physical Exam    Constitutional: Alert, no distress.  Skin: Warm, dry, good turgor, no rashes in visible areas.  Eye: Equal, round and reactive, conjunctiva clear, lids normal.  ENMT: Lips without lesions, good dentition, oropharynx clear.  Neck: Trachea midline, no masses, no thyromegaly.   Psych: Alert and oriented x3, normal affect and mood.      Results  Laboratory Studies  A1c was 6.1. Liver function, kidney function, microalbumin test, thyroid function, and blood count were all normal. Testosterone level was low.    Imaging  CT scan showed two sub-6 mm pulmonary nodules.        Assessment and Plan:   The following treatment plan was discussed    Assessment & Plan  1. Pulmonary nodules.  The patient has a history of bilateral lung nodules, with the largest being 6 mm. A follow-up CT scan without contrast has been ordered to monitor these nodules. He is advised to schedule this CT scan promptly.    2. Diabetes Mellitus.  His diabetes is well-controlled with an A1c of 6.1. He is currently taking metformin. He is advised to continue his current medication regimen and maintain regular follow-ups with his eye specialist,  " Ema Pascual, for annual diabetic eye screenings.    3. Hemorrhoids.  He continues to experience hemorrhoids. No new treatment was discussed during this visit.    4. Gastroesophageal Reflux Disease (GERD).  He continues to experience reflux symptoms. He is currently managing his symptoms with over-the-counter omeprazole. He is advised to continue this regimen.    5. Hyperlipidemia.  His cholesterol levels are within normal limits. A prescription for TriCor (fenofibrate) has been refilled and sent to [facility].    6. Low testosterone.  His testosterone levels were found to be low. He is advised to undergo two additional testosterone level checks, one at the end of this month and another at the end of January 2025, between 7:00 AM and 9:00 AM. A referral to urology has been made for further discussion regarding testosterone management. If the levels remain low, a third test will be conducted.    Follow-up  The patient will follow up in 6 months.    PROCEDURE  The patient underwent a vasectomy 19 years ago, which was unsuccessful. He also had a cholecystectomy.      ORDERS:  1. Multiple lung nodules    - CT-CHEST (THORAX) W/O; Future  - fenofibrate (TRICOR) 145 MG Tab; Take 1 Tablet by mouth every day.  Dispense: 90 Tablet; Refill: 3    2. Low testosterone    - Testosterone, Free & Total, Adult Male (w/SHBG); Standing    3. Type 2 diabetes mellitus without complication, without long-term current use of insulin (HCC)          Please note that this dictation was created using voice recognition software. I have made every reasonable attempt to correct obvious errors, but I expect that there are errors of grammar and possibly content that I did not discover before finalizing the note.      Attestation      Verbal consent was acquired by the patient to use [email or web address] ambient listening note generation during this visit No

## 2024-12-27 ENCOUNTER — HOSPITAL ENCOUNTER (OUTPATIENT)
Dept: RADIOLOGY | Facility: MEDICAL CENTER | Age: 49
End: 2024-12-27
Attending: PHYSICIAN ASSISTANT
Payer: COMMERCIAL

## 2024-12-27 ENCOUNTER — HOSPITAL ENCOUNTER (OUTPATIENT)
Dept: LAB | Facility: MEDICAL CENTER | Age: 49
End: 2024-12-27
Attending: PHYSICIAN ASSISTANT
Payer: COMMERCIAL

## 2024-12-27 DIAGNOSIS — R91.8 MULTIPLE LUNG NODULES: ICD-10-CM

## 2024-12-27 DIAGNOSIS — R79.89 LOW TESTOSTERONE: ICD-10-CM

## 2024-12-27 PROCEDURE — 84402 ASSAY OF FREE TESTOSTERONE: CPT

## 2024-12-27 PROCEDURE — 84270 ASSAY OF SEX HORMONE GLOBUL: CPT

## 2024-12-27 PROCEDURE — 36415 COLL VENOUS BLD VENIPUNCTURE: CPT

## 2024-12-27 PROCEDURE — 84403 ASSAY OF TOTAL TESTOSTERONE: CPT

## 2024-12-27 PROCEDURE — 71250 CT THORAX DX C-: CPT

## 2024-12-28 LAB
SHBG SERPL-SCNC: 18 NMOL/L (ref 17–56)
TESTOST FREE MFR SERPL: 2.4 % (ref 1.6–2.9)
TESTOST FREE SERPL-MCNC: 86 PG/ML (ref 47–244)
TESTOST SERPL-MCNC: 358 NG/DL (ref 300–890)

## 2025-02-13 ENCOUNTER — OFFICE VISIT (OUTPATIENT)
Dept: URGENT CARE | Facility: PHYSICIAN GROUP | Age: 50
End: 2025-02-13
Payer: COMMERCIAL

## 2025-02-13 VITALS
HEART RATE: 67 BPM | RESPIRATION RATE: 18 BRPM | SYSTOLIC BLOOD PRESSURE: 110 MMHG | HEIGHT: 70 IN | TEMPERATURE: 97.8 F | OXYGEN SATURATION: 97 % | DIASTOLIC BLOOD PRESSURE: 68 MMHG | WEIGHT: 203 LBS | BODY MASS INDEX: 29.06 KG/M2

## 2025-02-13 DIAGNOSIS — J01.40 ACUTE NON-RECURRENT PANSINUSITIS: ICD-10-CM

## 2025-02-13 PROCEDURE — 3078F DIAST BP <80 MM HG: CPT | Performed by: NURSE PRACTITIONER

## 2025-02-13 PROCEDURE — 99213 OFFICE O/P EST LOW 20 MIN: CPT | Performed by: NURSE PRACTITIONER

## 2025-02-13 PROCEDURE — 3074F SYST BP LT 130 MM HG: CPT | Performed by: NURSE PRACTITIONER

## 2025-02-13 ASSESSMENT — FIBROSIS 4 INDEX: FIB4 SCORE: 0.82

## 2025-02-14 NOTE — PROGRESS NOTES
Chief Complaint   Patient presents with    Cough     Sinus pressure, throat pain, bilateral ear pain, x2 weeks.          History of Present Illness  The patient is a 49-year-old male who presents for evaluation of sinus congestion and ear pain.    He has been experiencing sinus congestion, ear pain, and a dry cough for approximately 3 weeks. Initially, he attributed these symptoms to a common cold, but their persistence prompted him to seek medical attention. He reports intermittent bilateral ear discomfort, predominantly affecting one ear, described as a crackling sensation similar to the feeling of water in the ears. The cough, which is productive and phlegmy, is severe enough to disrupt his sleep and occasionally induces headaches. He also experiences pain and pressure in his sinuses, cheeks, behind his eyes, and forehead, which sometimes radiates to his teeth. He reports no gastrointestinal symptoms such as nausea, vomiting, or diarrhea, and has not had any recent fevers.         ROS:    No severe shortness of breath   No Cardiac like chest pain, as discussed   As otherwise stated in HPI    Medical/SX/ Social History:  Reviewed per chart    Pertinent Medications:    Current Outpatient Medications on File Prior to Visit   Medication Sig Dispense Refill    fenofibrate (TRICOR) 145 MG Tab Take 1 Tablet by mouth every day. 90 Tablet 3    Cholecalciferol (D3 PO) Take 1 Capsule by mouth every day.      nadolol (CORGARD) 20 MG Tab Take 1 Tablet by mouth every day. 90 Tablet 3    atorvastatin (LIPITOR) 10 MG Tab Take 1 Tablet by mouth every evening. 90 Tablet 3    escitalopram (LEXAPRO) 20 MG tablet TAKE ONE TABLET BY MOUTH DAILY (Patient taking differently: Take 20 mg by mouth every day.) 90 Tablet 3    metFORMIN (GLUCOPHAGE) 500 MG Tab TAKE TWO TABLETS BY MOUTH TWICE A DAY WITH MEALS (Patient taking differently: Take 1,000 mg by mouth 2 times a day.) 360 Tablet 3    Ferrous Sulfate (IRON PO) Take 1 Tablet by mouth  every 48 hours.      omeprazole (PRILOSEC) 20 MG delayed-release capsule Take 1 Cap by mouth every day. 30 Cap 0     No current facility-administered medications on file prior to visit.        Allergies:    Coconut oil     Problem list, medications, and allergies reviewed by myself today in Epic     Physical Exam:    Vitals:    02/13/25 1702   BP: 110/68   Pulse: 67   Resp: 18   Temp: 36.6 °C (97.8 °F)   SpO2: 97%             Physical Exam  Constitutional:       General: He is awake.      Appearance: Normal appearance. He is not ill-appearing, toxic-appearing or diaphoretic.   HENT:      Head: Normocephalic and atraumatic.      Right Ear: Tympanic membrane, ear canal and external ear normal.      Left Ear: Tympanic membrane, ear canal and external ear normal.      Nose: Mucosal edema, congestion and rhinorrhea present. Rhinorrhea is clear.      Right Turbinates: Swollen.      Left Turbinates: Swollen.      Right Sinus: Maxillary sinus tenderness and frontal sinus tenderness present.      Left Sinus: Maxillary sinus tenderness and frontal sinus tenderness present.      Mouth/Throat:      Lips: Pink.      Mouth: Mucous membranes are moist.      Tongue: No lesions.      Palate: No lesions.      Pharynx: Posterior oropharyngeal erythema and postnasal drip present. No oropharyngeal exudate or uvula swelling.      Tonsils: No tonsillar exudate or tonsillar abscesses.   Eyes:      General: Lids are normal. Gaze aligned appropriately. No allergic shiner or scleral icterus.     Extraocular Movements: Extraocular movements intact.      Conjunctiva/sclera: Conjunctivae normal.      Pupils: Pupils are equal, round, and reactive to light.   Cardiovascular:      Rate and Rhythm: Normal rate and regular rhythm.      Pulses:           Radial pulses are 2+ on the right side and 2+ on the left side.      Heart sounds: Normal heart sounds.   Pulmonary:      Effort: Pulmonary effort is normal.      Breath sounds: Normal breath sounds  and air entry. No decreased breath sounds, wheezing, rhonchi or rales.   Abdominal:      General: Abdomen is flat. Bowel sounds are normal.      Palpations: Abdomen is soft.      Tenderness: There is no abdominal tenderness.   Musculoskeletal:      Right lower leg: No edema.      Left lower leg: No edema.   Lymphadenopathy:      Cervical: No cervical adenopathy.   Skin:     General: Skin is warm.      Capillary Refill: Capillary refill takes less than 2 seconds.      Coloration: Skin is not cyanotic or pale.   Neurological:      Mental Status: He is alert and oriented to person, place, and time.      Gait: Gait is intact.   Psychiatric:         Behavior: Behavior normal. Behavior is cooperative.            Diagnostics:    Results for orders placed or performed during the hospital encounter of 12/27/24   Testosterone, Free & Total, Adult Male (w/SHBG)    Collection Time: 12/27/24  8:17 AM   Result Value Ref Range    Testosterone,Total 358 300 - 890 ng/dL    Sex Hormone Bind Globulin 18 17 - 56 nmol/L    Free Testosterone 86 47 - 244 pg/mL    Testosterone % Free 2.4 1.6 - 2.9 %       Medical Decision making and plan :  I personally reviewed prior external notes and test results pertinent to today's visit. Pt is clinically stable at today's acute urgent care visit.  Patient appears nontoxic with no acute distress noted. Appropriate for outpatient care at this time.    Pleasant 49 y.o. male presented clinic with:     Assessment & Plan  1. Sinusitis.  He has been experiencing sinus congestion, ear pain, and a dry cough for approximately 3 weeks. Examination revealed significant postnasal drip, likely contributing to the persistent cough. There is no evidence of ear infection, but considerable mucus pressure is present. The condition is not contagious at this stage. Augmentin will be prescribed for 7 days. If he develops ear pain, the Augmentin will also address a potential ear infection.      Shared decision-making was  utilized with patient for treatment plan. Medication discussed included indication for use and the potential benefits and side effects. Education was provided regarding the aforementioned assessments.  Differential Diagnosis, natural history, and supportive care discussed. All of the patient's questions were answered to their satisfaction at the time of discharge. Patient was encouraged to monitor symptoms closely. Those signs and symptoms which would warrant concern and mandate seeking a higher level of service through the emergency department discussed at length.  Patient stated agreement and understanding of this plan of care.    Disposition:  Home in stable condition     Voice Recognition Disclaimer:  Portions of this document were created using voice recognition software and [employer] technology provided by [employer].  Patient was informed of [email or web address] technology being used.  The software does have a chance of producing errors of grammar and possibly content. I have made every reasonable attempt to correct obvious errors, but there could be errors of grammar and possibly content that I did not discover before finalizing the documentation.    Analy Welch, [email or web address].

## 2025-02-23 ENCOUNTER — OFFICE VISIT (OUTPATIENT)
Dept: URGENT CARE | Facility: PHYSICIAN GROUP | Age: 50
End: 2025-02-23
Payer: COMMERCIAL

## 2025-02-23 VITALS
RESPIRATION RATE: 17 BRPM | DIASTOLIC BLOOD PRESSURE: 74 MMHG | HEART RATE: 79 BPM | SYSTOLIC BLOOD PRESSURE: 116 MMHG | HEIGHT: 70 IN | TEMPERATURE: 97.9 F | BODY MASS INDEX: 28.92 KG/M2 | WEIGHT: 202 LBS | OXYGEN SATURATION: 97 %

## 2025-02-23 DIAGNOSIS — H93.8X3 SENSATION OF FULLNESS IN BOTH EARS: ICD-10-CM

## 2025-02-23 DIAGNOSIS — R05.1 ACUTE COUGH: ICD-10-CM

## 2025-02-23 DIAGNOSIS — J01.40 ACUTE PANSINUSITIS, RECURRENCE NOT SPECIFIED: ICD-10-CM

## 2025-02-23 DIAGNOSIS — R09.82 POST-NASAL DRIP: ICD-10-CM

## 2025-02-23 PROCEDURE — 3078F DIAST BP <80 MM HG: CPT | Performed by: PHYSICIAN ASSISTANT

## 2025-02-23 PROCEDURE — 99214 OFFICE O/P EST MOD 30 MIN: CPT | Performed by: PHYSICIAN ASSISTANT

## 2025-02-23 PROCEDURE — 3074F SYST BP LT 130 MM HG: CPT | Performed by: PHYSICIAN ASSISTANT

## 2025-02-23 RX ORDER — CEFDINIR 300 MG/1
300 CAPSULE ORAL 2 TIMES DAILY
Qty: 14 CAPSULE | Refills: 0 | Status: SHIPPED | OUTPATIENT
Start: 2025-02-23 | End: 2025-03-02

## 2025-02-23 RX ORDER — DEXTROMETHORPHAN HYDROBROMIDE AND PROMETHAZINE HYDROCHLORIDE 15; 6.25 MG/5ML; MG/5ML
5 SYRUP ORAL EVERY 4 HOURS PRN
Qty: 180 ML | Refills: 0 | Status: SHIPPED | OUTPATIENT
Start: 2025-02-23 | End: 2025-03-05

## 2025-02-23 ASSESSMENT — FIBROSIS 4 INDEX: FIB4 SCORE: 0.82

## 2025-02-23 NOTE — PROGRESS NOTES
Subjective     Fredis Perry is a 49 y.o. male who presents with Sinusitis (3 week ago, still having sinus pressure getting worse)    PMH:  has a past medical history of Anxiety, Arthritis, Depression, Diabetes (HCC), GERD (gastroesophageal reflux disease), Heart burn, High cholesterol, Hypertension, and Indigestion.  MEDS:   Current Outpatient Medications:     cefdinir (OMNICEF) 300 MG Cap, Take 1 Capsule by mouth 2 times a day for 7 days., Disp: 14 Capsule, Rfl: 0    promethazine-dextromethorphan (PROMETHAZINE-DM) 6.25-15 MG/5ML syrup, Take 5 mL by mouth every four hours as needed for Cough for up to 10 days., Disp: 180 mL, Rfl: 0    fenofibrate (TRICOR) 145 MG Tab, Take 1 Tablet by mouth every day., Disp: 90 Tablet, Rfl: 3    Cholecalciferol (D3 PO), Take 1 Capsule by mouth every day., Disp: , Rfl:     nadolol (CORGARD) 20 MG Tab, Take 1 Tablet by mouth every day., Disp: 90 Tablet, Rfl: 3    atorvastatin (LIPITOR) 10 MG Tab, Take 1 Tablet by mouth every evening., Disp: 90 Tablet, Rfl: 3    escitalopram (LEXAPRO) 20 MG tablet, TAKE ONE TABLET BY MOUTH DAILY (Patient taking differently: Take 20 mg by mouth every day.), Disp: 90 Tablet, Rfl: 3    metFORMIN (GLUCOPHAGE) 500 MG Tab, TAKE TWO TABLETS BY MOUTH TWICE A DAY WITH MEALS (Patient taking differently: Take 1,000 mg by mouth 2 times a day.), Disp: 360 Tablet, Rfl: 3    Ferrous Sulfate (IRON PO), Take 1 Tablet by mouth every 48 hours., Disp: , Rfl:     omeprazole (PRILOSEC) 20 MG delayed-release capsule, Take 1 Cap by mouth every day., Disp: 30 Cap, Rfl: 0  ALLERGIES:   Allergies   Allergen Reactions    Coconut Oil Rash     RASH, HIVES     SURGHX:   Past Surgical History:   Procedure Laterality Date    EDMOND BY LAPAROSCOPY N/A 10/31/2024    Procedure: LAPAROSCOPIC CHOLECYSTECTOMY.;  Surgeon: Maurilio Cox M.D.;  Location: SURGERY [facility];  Service: General    VENTRAL HERNIA REPAIR N/A 09/01/2020    Procedure: REPAIR, HERNIA, VENTRAL-INCARCERATED,  "POSSIBLE MESH;  Surgeon: Yahir Cunningham M.D.;  Location: SURGERY SAME DAY [facility];  Service: General    OTHER      TONSILLS AS CHILD    OTHER ABDOMINAL SURGERY      Umbilical hernia    TONSILLECTOMY      VASECTOMY      Failed     SOCHX:  reports that he quit smoking about 26 years ago. His smoking use included cigarettes and cigars. He has been exposed to tobacco smoke. He has never used smokeless tobacco. He reports that he does not currently use alcohol. He reports that he does not currently use drugs.  FH: Reviewed with patient, not pertinent to this visit.           Patient presents with:  Sinusitis: 3 week ago, still having sinus pressure, was getting better on the antibiotics, once he completed the medication it started getting worse again.  Patient thinks he may be did not have a long enough course of antibiotic treatment.  Patient has also tried some over-the-counter decongestants as well as ibuprofen with no change in his symptoms.  No other complaints.        Sinusitis  Associated symptoms include congestion, coughing and ear pain. Pertinent negatives include no sore throat.       Review of Systems   Constitutional:  Negative for fever.   HENT:  Positive for congestion, ear pain and sinus pain. Negative for sore throat.    Respiratory:  Positive for cough.    All other systems reviewed and are negative.             Objective     /74   Pulse 79   Temp 36.6 °C (97.9 °F)   Resp 17   Ht 1.778 m (5' 10\")   Wt 91.6 kg (202 lb)   SpO2 97%   BMI 28.98 kg/m²      Physical Exam  Vitals and nursing note reviewed.   Constitutional:       General: He is not in acute distress.     Appearance: He is well-developed. He is not ill-appearing or toxic-appearing.   HENT:      Head: Normocephalic and atraumatic.      Right Ear: Tympanic membrane normal.      Left Ear: Tympanic membrane normal.      Nose: Mucosal edema, congestion and rhinorrhea present. Rhinorrhea is purulent.      Right Sinus: Maxillary sinus " tenderness and frontal sinus tenderness present.      Left Sinus: Maxillary sinus tenderness and frontal sinus tenderness present.      Comments: Purulent postnasal drip visible in posterior oropharynx.     Mouth/Throat:      Lips: Pink.      Mouth: Mucous membranes are moist.      Pharynx: Uvula midline.   Eyes:      Extraocular Movements: Extraocular movements intact.      Conjunctiva/sclera: Conjunctivae normal.      Pupils: Pupils are equal, round, and reactive to light.   Cardiovascular:      Rate and Rhythm: Normal rate and regular rhythm.      Heart sounds: Normal heart sounds.   Pulmonary:      Effort: Pulmonary effort is normal.      Breath sounds: Normal breath sounds. No rales.   Abdominal:      Palpations: Abdomen is soft.   Musculoskeletal:         General: Normal range of motion.      Cervical back: Normal range of motion and neck supple.   Skin:     General: Skin is warm.      Capillary Refill: Capillary refill takes less than 2 seconds.   Neurological:      General: No focal deficit present.      Mental Status: He is alert and oriented to person, place, and time.      Gait: Gait normal.   Psychiatric:         Mood and Affect: Mood normal.         Behavior: Behavior is cooperative.                                  Assessment & Plan  Acute pansinusitis, recurrence not specified    Orders:    cefdinir (OMNICEF) 300 MG Cap; Take 1 Capsule by mouth 2 times a day for 7 days.    promethazine-dextromethorphan (PROMETHAZINE-DM) 6.25-15 MG/5ML syrup; Take 5 mL by mouth every four hours as needed for Cough for up to 10 days.    Acute cough    Orders:    cefdinir (OMNICEF) 300 MG Cap; Take 1 Capsule by mouth 2 times a day for 7 days.    promethazine-dextromethorphan (PROMETHAZINE-DM) 6.25-15 MG/5ML syrup; Take 5 mL by mouth every four hours as needed for Cough for up to 10 days.    Post-nasal drip    Orders:    cefdinir (OMNICEF) 300 MG Cap; Take 1 Capsule by mouth 2 times a day for 7 days.     promethazine-dextromethorphan (PROMETHAZINE-DM) 6.25-15 MG/5ML syrup; Take 5 mL by mouth every four hours as needed for Cough for up to 10 days.    Sensation of fullness in both ears    Orders:    cefdinir (OMNICEF) 300 MG Cap; Take 1 Capsule by mouth 2 times a day for 7 days.    promethazine-dextromethorphan (PROMETHAZINE-DM) 6.25-15 MG/5ML syrup; Take 5 mL by mouth every four hours as needed for Cough for up to 10 days.           Patient HPI physical exam consistent with pansinusitis, purulent postnasal drip visible in posterior oropharynx.  I will treat with cefdinir twice daily x 7 days, he has recently finished a course of Augmentin without resolution so I did not want to repeat.    Rx cough medicine for cough relief after work.     Patient can continue taking over-the-counter decongestants as needed for sinus pain and pressure, ear pain and pressure.    Motrin/Advil/Ibuprophen 600 mg every 6 hours as needed for pain or fever.    Differential diagnosis, supportive care, and indications for immediate follow-up discussed with patient.  Instructed to return to clinic or nearest emergency department for any change in condition, further concerns, or worsening of symptoms.    I personally reviewed prior external notes and test results pertinent to today's visit.  I have independently reviewed and interpreted all diagnostics ordered during this urgent care visit.    PT should follow up with PCP in 1-2 days for re-evaluation if symptoms have not improved.      Discussed red flags and reasons to return to UC or ED.      Pt and/or family verbalized understanding of diagnosis and follow up instructions and was offered informational handout on diagnosis.  PT discharged.     Please note that this dictation was created using voice recognition software. I have made every reasonable attempt to correct obvious errors, but I expect that there may be errors of grammar and possibly content that I did not discover before finalizing  the note.

## 2025-02-24 ASSESSMENT — ENCOUNTER SYMPTOMS
FEVER: 0
SINUS PAIN: 1
SORE THROAT: 0
COUGH: 1

## 2025-03-06 DIAGNOSIS — F41.9 ANXIETY AND DEPRESSION: ICD-10-CM

## 2025-03-06 DIAGNOSIS — E11.9 TYPE 2 DIABETES MELLITUS WITHOUT COMPLICATION, WITHOUT LONG-TERM CURRENT USE OF INSULIN (HCC): ICD-10-CM

## 2025-03-06 DIAGNOSIS — F32.A ANXIETY AND DEPRESSION: ICD-10-CM

## 2025-03-06 RX ORDER — ESCITALOPRAM OXALATE 20 MG/1
20 TABLET ORAL DAILY
Qty: 90 TABLET | Refills: 3 | Status: SHIPPED | OUTPATIENT
Start: 2025-03-06

## 2025-03-06 NOTE — TELEPHONE ENCOUNTER
Received request via: Pharmacy    Was the patient seen in the last year in this department? Yes    Does the patient have an active prescription (recently filled or refills available) for medication(s) requested? No    [facility]    Does the patient have [facility] and need 100-day supply? (This applies to ALL medications) Patient does not have SCP

## 2025-03-06 NOTE — TELEPHONE ENCOUNTER
Received request via: Pharmacy    Was the patient seen in the last year in this department? Yes    Does the patient have an active prescription (recently filled or refills available) for medication(s) requested? No    [facility] Name: [facility]    Does the patient have [facility] Plus and need 100-day supply? (This applies to ALL medications) Patient does not have SCP

## 2025-03-12 ENCOUNTER — OFFICE VISIT (OUTPATIENT)
Dept: URGENT CARE | Facility: PHYSICIAN GROUP | Age: 50
End: 2025-03-12
Payer: COMMERCIAL

## 2025-03-12 ENCOUNTER — HOSPITAL ENCOUNTER (OUTPATIENT)
Dept: RADIOLOGY | Facility: MEDICAL CENTER | Age: 50
End: 2025-03-12
Payer: COMMERCIAL

## 2025-03-12 VITALS
BODY MASS INDEX: 28.28 KG/M2 | HEIGHT: 71 IN | HEART RATE: 66 BPM | TEMPERATURE: 97.8 F | OXYGEN SATURATION: 98 % | RESPIRATION RATE: 18 BRPM | DIASTOLIC BLOOD PRESSURE: 70 MMHG | WEIGHT: 202 LBS | SYSTOLIC BLOOD PRESSURE: 110 MMHG

## 2025-03-12 DIAGNOSIS — R05.2 SUBACUTE COUGH: ICD-10-CM

## 2025-03-12 DIAGNOSIS — J45.21 MILD INTERMITTENT REACTIVE AIRWAY DISEASE WITH ACUTE EXACERBATION: ICD-10-CM

## 2025-03-12 DIAGNOSIS — R07.89 CHEST TIGHTNESS: ICD-10-CM

## 2025-03-12 LAB
FLUAV RNA SPEC QL NAA+PROBE: NEGATIVE
FLUBV RNA SPEC QL NAA+PROBE: NEGATIVE
RSV RNA SPEC QL NAA+PROBE: NEGATIVE
SARS-COV-2 RNA RESP QL NAA+PROBE: NEGATIVE

## 2025-03-12 PROCEDURE — 94640 AIRWAY INHALATION TREATMENT: CPT

## 2025-03-12 PROCEDURE — 0241U POCT CEPHEID COV-2, FLU A/B, RSV - PCR: CPT

## 2025-03-12 PROCEDURE — 99214 OFFICE O/P EST MOD 30 MIN: CPT | Mod: 25

## 2025-03-12 PROCEDURE — 71046 X-RAY EXAM CHEST 2 VIEWS: CPT

## 2025-03-12 RX ORDER — CETIRIZINE HYDROCHLORIDE 10 MG/1
10 TABLET ORAL DAILY
Qty: 30 TABLET | Refills: 0 | Status: SHIPPED | OUTPATIENT
Start: 2025-03-12

## 2025-03-12 RX ORDER — ALBUTEROL SULFATE 0.83 MG/ML
2.5 SOLUTION RESPIRATORY (INHALATION) ONCE
Status: COMPLETED | OUTPATIENT
Start: 2025-03-12 | End: 2025-03-12

## 2025-03-12 RX ORDER — FLUTICASONE PROPIONATE 50 MCG
1 SPRAY, SUSPENSION (ML) NASAL DAILY
Qty: 16 G | Refills: 0 | Status: SHIPPED | OUTPATIENT
Start: 2025-03-12

## 2025-03-12 RX ORDER — PREDNISONE 20 MG/1
40 TABLET ORAL DAILY
Qty: 10 TABLET | Refills: 0 | Status: SHIPPED | OUTPATIENT
Start: 2025-03-13 | End: 2025-03-18

## 2025-03-12 RX ORDER — ALBUTEROL SULFATE 90 UG/1
2 INHALANT RESPIRATORY (INHALATION) EVERY 6 HOURS PRN
Qty: 8.5 G | Refills: 0 | Status: SHIPPED | OUTPATIENT
Start: 2025-03-12

## 2025-03-12 RX ADMIN — ALBUTEROL SULFATE 2.5 MG: 0.83 SOLUTION RESPIRATORY (INHALATION) at 14:59

## 2025-03-12 ASSESSMENT — FIBROSIS 4 INDEX: FIB4 SCORE: 0.82

## 2025-03-12 NOTE — PROGRESS NOTES
Subjective:   Fredis Perry is a 49 y.o. male who presents for Cough (Possibly would like X-Ray )      HPI:    Returns to  with concerns of persistent cough x 1.5 month  Cough is mostly worse in the morning and again when he lays down  Denies wheezing, but has chest tightness. Denies SOB.  Cough is not productive  Has history of sports induced bronchospasm and incidental lung nodules were found on CT in 12/24 when he had kidney stones  Denies recurrence of fever, chills, body aches. Having night sweats  Denies unintentional weight loss since having his gallbladder removed in 10/24.      ROS As above in HPI    Medications:    Current Outpatient Medications on File Prior to Visit   Medication Sig Dispense Refill    metFORMIN (GLUCOPHAGE) 500 MG Tab Take 2 Tablets by mouth 2 times a day with meals. 360 Tablet 3    escitalopram (LEXAPRO) 20 MG tablet Take 1 Tablet by mouth every day. 90 Tablet 3    fenofibrate (TRICOR) 145 MG Tab Take 1 Tablet by mouth every day. 90 Tablet 3    Cholecalciferol (D3 PO) Take 1 Capsule by mouth every day.      nadolol (CORGARD) 20 MG Tab Take 1 Tablet by mouth every day. 90 Tablet 3    atorvastatin (LIPITOR) 10 MG Tab Take 1 Tablet by mouth every evening. 90 Tablet 3    Ferrous Sulfate (IRON PO) Take 1 Tablet by mouth every 48 hours.      omeprazole (PRILOSEC) 20 MG delayed-release capsule Take 1 Cap by mouth every day. 30 Cap 0     No current facility-administered medications on file prior to visit.        Allergies:   Coconut oil    Problem List:   Patient Active Problem List   Diagnosis    Hyperlipidemia associated with type 2 diabetes mellitus (HCC)    Anxiety and depression    GERD (gastroesophageal reflux disease)    High triglycerides    Type 2 diabetes mellitus without complication, without long-term current use of insulin (HCC)    Hemorrhoid    Iron deficiency anemia    Calculus of gallbladder without cholecystitis without obstruction    Multiple lung nodules     "Nephrolithiasis        Surgical History:  Past Surgical History:   Procedure Laterality Date    EDMOND BY LAPAROSCOPY N/A 10/31/2024    Procedure: LAPAROSCOPIC CHOLECYSTECTOMY.;  Surgeon: Maurilio Cox M.D.;  Location: SURGERY [facility];  Service: General    VENTRAL HERNIA REPAIR N/A 2020    Procedure: REPAIR, HERNIA, VENTRAL-INCARCERATED, POSSIBLE MESH;  Surgeon: Yahir Cunningham M.D.;  Location: SURGERY SAME DAY [facility];  Service: General    OTHER      TONSILLS AS CHILD    OTHER ABDOMINAL SURGERY      Umbilical hernia    TONSILLECTOMY      VASECTOMY      Failed       Past Social Hx:   Social History     Tobacco Use    Smoking status: Former     Current packs/day: 0.00     Types: Cigarettes, Cigars     Quit date: 1999     Years since quittin.2     Passive exposure: Past    Smokeless tobacco: Never   Vaping Use    Vaping status: Never Used   Substance Use Topics    Alcohol use: Not Currently     Comment: none x5 years    Drug use: Not Currently          Problem list, medications, and allergies reviewed by myself today in Epic.     Objective:     /70   Pulse 66   Temp 36.6 °C (97.8 °F)   Resp 18   Ht 1.803 m (5' 11\")   Wt 91.6 kg (202 lb)   SpO2 98%   BMI 28.17 kg/m²     Physical Exam  Vitals and nursing note reviewed.   Constitutional:       General: He is not in acute distress.     Appearance: Normal appearance. He is not ill-appearing or diaphoretic.   HENT:      Head: Normocephalic.      Right Ear: Tympanic membrane and ear canal normal.      Left Ear: Tympanic membrane and ear canal normal.      Nose: Congestion and rhinorrhea present.      Mouth/Throat:      Mouth: Mucous membranes are moist.      Pharynx: Oropharynx is clear. Uvula midline. Postnasal drip present. No pharyngeal swelling, oropharyngeal exudate, posterior oropharyngeal erythema or uvula swelling.      Tonsils: No tonsillar exudate or tonsillar abscesses.   Cardiovascular:      Rate and Rhythm: Normal rate and regular " rhythm.      Heart sounds: Normal heart sounds. No murmur heard.     No friction rub. No gallop.   Pulmonary:      Effort: Pulmonary effort is normal. No respiratory distress.      Breath sounds: No stridor. Examination of the right-lower field reveals decreased breath sounds. Examination of the left-lower field reveals decreased breath sounds. Decreased breath sounds present. No wheezing, rhonchi or rales.   Chest:      Chest wall: No tenderness.   Abdominal:      General: Abdomen is flat. Bowel sounds are normal.      Palpations: Abdomen is soft.   Musculoskeletal:      Cervical back: No rigidity or tenderness.   Lymphadenopathy:      Cervical: No cervical adenopathy.   Skin:     General: Skin is warm and dry.      Capillary Refill: Capillary refill takes less than 2 seconds.      Findings: No rash.   Neurological:      Mental Status: He is alert and oriented to person, place, and time.         Assessment/Plan:       Results for orders placed or performed in visit on 03/12/25   POCT CoV-2, Flu A/B, RSV by PCR    Collection Time: 03/12/25  5:34 PM   Result Value Ref Range    SARS-CoV-2 by PCR Negative Negative, Invalid    Influenza virus A RNA Negative Negative, Invalid    Influenza virus B, PCR Negative Negative, Invalid    RSV, PCR Negative Negative, Invalid     DX-CHEST-2 VIEWS 03/12/2025    Narrative  3/12/2025 2:55 PM    HISTORY/REASON FOR EXAM:  Cough      TECHNIQUE/EXAM DESCRIPTION AND NUMBER OF VIEWS:  Two views of the chest.    COMPARISON:  None.    FINDINGS:    No pulmonary infiltrates or consolidations are noted.  No pleural effusions, no pneumothorax are appreciated.  Normal cardiopericardial silhouette.    Impression  1. No active cardiopulmonary abnormalities are identified.      Diagnosis and associated orders:   1. Subacute cough  - POCT CoV-2, Flu A/B, RSV by PCR  - DX-CHEST-2 VIEWS; Future    2. Chest tightness  - albuterol (Proventil) 2.5mg/3ml nebulizer solution 2.5 mg    3. Mild intermittent  reactive airway disease with acute exacerbation  - fluticasone (FLONASE) 50 MCG/ACT nasal spray; Administer 1 Spray into affected nostril(S) every day.  Dispense: 16 g; Refill: 0  - cetirizine (ZYRTEC) 10 MG Tab; Take 1 Tablet by mouth every day.  Dispense: 30 Tablet; Refill: 0  - albuterol 108 (90 Base) MCG/ACT Aero Soln inhalation aerosol; Inhale 2 Puffs every 6 hours as needed for Shortness of Breath.  Dispense: 8.5 g; Refill: 0  - predniSONE (DELTASONE) 20 MG Tab; Take 2 Tablets by mouth every day for 5 days.  Dispense: 10 Tablet; Refill: 0      Comments/MDM:     Patient tested negative for covid, influenza, rsv   Chest xray obtained, per radiology impression, no active cardiopulmonary abnormalities are identified.   Vital signs are stable, patient is nontoxic. No signs of respiratory distress.  Patient has history of exercise induced bronchospasm, may have underlying asthma. He received nebulized albuterol in office after which he reports alleviation of his chest tightness and improved ease of breathing. SpO2 is % and lungs are CTAB. Albuterol and prednisone ordered.   Supportive measures encouraged: Rest, increased oral hydration, NSAIDs/tylenol as needed per package instructions, decongestant, warm humidification, otc antihistamines, Flonase, cough suppressant as needed   Strict return to ER precautions reviewed  Follow-up with primary care advised       Return to clinic or go to ED if symptoms worsen or persist. Indications for ED discussed at length. Patient/Parent/Guardian voices understanding. Follow-up with your primary care provider in 3-5 days. Red flag symptoms discussed. All side effects of medication discussed including allergic response, GI upset, tendon injury, rash, sedation etc.    My total time spent caring for the patient on the day of the encounter was 122 minutes.   This does not include time spent on separately billable procedures/tests.    Please note that this dictation was created  using voice recognition software. I have made a reasonable attempt to correct obvious errors, but I expect that there are errors of grammar and possibly content that I did not discover before finalizing the note.    This note was electronically signed by OUMOU Huddleston

## 2025-03-27 ENCOUNTER — OFFICE VISIT (OUTPATIENT)
Dept: URGENT CARE | Facility: CLINIC | Age: 50
End: 2025-03-27
Payer: COMMERCIAL

## 2025-03-27 VITALS
SYSTOLIC BLOOD PRESSURE: 118 MMHG | OXYGEN SATURATION: 97 % | HEIGHT: 64 IN | BODY MASS INDEX: 35.34 KG/M2 | TEMPERATURE: 98.1 F | WEIGHT: 207 LBS | DIASTOLIC BLOOD PRESSURE: 60 MMHG | HEART RATE: 74 BPM | RESPIRATION RATE: 18 BRPM

## 2025-03-27 DIAGNOSIS — J01.41 ACUTE RECURRENT PANSINUSITIS: ICD-10-CM

## 2025-03-27 PROCEDURE — 99213 OFFICE O/P EST LOW 20 MIN: CPT | Performed by: NURSE PRACTITIONER

## 2025-03-27 PROCEDURE — 3078F DIAST BP <80 MM HG: CPT | Performed by: NURSE PRACTITIONER

## 2025-03-27 PROCEDURE — 3074F SYST BP LT 130 MM HG: CPT | Performed by: NURSE PRACTITIONER

## 2025-03-27 RX ORDER — AZELASTINE 1 MG/ML
1 SPRAY, METERED NASAL 2 TIMES DAILY
Qty: 30 ML | Refills: 0 | Status: SHIPPED | OUTPATIENT
Start: 2025-03-27

## 2025-03-27 RX ORDER — DOXYCYCLINE HYCLATE 100 MG
100 TABLET ORAL 2 TIMES DAILY
Qty: 14 TABLET | Refills: 0 | Status: SHIPPED | OUTPATIENT
Start: 2025-03-27 | End: 2025-04-03

## 2025-03-27 RX ORDER — METHYLPREDNISOLONE 4 MG/1
TABLET ORAL
Qty: 21 TABLET | Refills: 0 | Status: SHIPPED | OUTPATIENT
Start: 2025-03-27

## 2025-03-27 ASSESSMENT — FIBROSIS 4 INDEX: FIB4 SCORE: 0.82

## 2025-03-27 NOTE — PROGRESS NOTES
Chief Complaint   Patient presents with    Sinus Problem     X 2 months, persistent sinus pain/pressure coughing yellow phlegm.          History of Present Illness  The patient is a 49-year-old male who presents for evaluation of sinus congestion, pain, and pressure.    He has been experiencing intermittent sinus congestion, pain, and pressure for the past 2 months, which has significantly worsened over the past week. The symptoms have been getting significantly worse over the past few days, mostly at night. He reports a persistent cough and sore throat, with associated body aches. He also experiences ear crackling and pressure, but without significant pain. He does not typically suffer from seasonal allergies. He reports pain and pressure in his cheeks, forehead, and behind his eyes, with no side being more affected than the other. He does not experience itchy or watery eyes. His family members have been frequently ill, with his son having a double ear infection and his wife recently recovering from influenza. He has sought medical attention at an urgent care facility 3 to 4 times, where he was prescribed antibiotics on 2 occasions and a course of steroids. Despite these interventions, his symptoms have progressively worsened, particularly at night. He continues to use a nasal spray and an inhaler intermittently, reporting some improvement in his chest symptoms. He is currently taking Zyrtec daily. He has previously taken Claritin. His symptoms have never persisted for this duration before. His symptoms slightly improve when he is on antibiotics. He was last seen on 02/13/2025, at which time he was prescribed Augmentin. However, his symptoms of sinus congestion, pain, and pressure worsened, leading to a follow-up visit 10 days later. At this point, he was prescribed cefdinir and a cough medication. On 03/12/2025, he presented with a persistent cough that had lasted for 1.5 months. He was then prescribed steroids,  albuterol, and Flonase. A chest x-ray performed at that time was negative. He reports that the steroids significantly improved his chest symptoms, but he did not notice any improvement in his sinus symptoms.    He has acid reflux and takes omeprazole.    MEDICATIONS  Current: Omeprazole, Flonase, albuterol.  Past: Augmentin, cefdinir.         ROS:    No severe shortness of breath   No Cardiac like chest pain, as discussed   As otherwise stated in HPI    Medical/SX/ Social History:  Reviewed per chart    Pertinent Medications:    Current Outpatient Medications on File Prior to Visit   Medication Sig Dispense Refill    fluticasone (FLONASE) 50 MCG/ACT nasal spray Administer 1 Spray into affected nostril(S) every day. 16 g 0    cetirizine (ZYRTEC) 10 MG Tab Take 1 Tablet by mouth every day. 30 Tablet 0    albuterol 108 (90 Base) MCG/ACT Aero Soln inhalation aerosol Inhale 2 Puffs every 6 hours as needed for Shortness of Breath. 8.5 g 0    metFORMIN (GLUCOPHAGE) 500 MG Tab Take 2 Tablets by mouth 2 times a day with meals. 360 Tablet 3    escitalopram (LEXAPRO) 20 MG tablet Take 1 Tablet by mouth every day. 90 Tablet 3    fenofibrate (TRICOR) 145 MG Tab Take 1 Tablet by mouth every day. 90 Tablet 3    Cholecalciferol (D3 PO) Take 1 Capsule by mouth every day.      nadolol (CORGARD) 20 MG Tab Take 1 Tablet by mouth every day. 90 Tablet 3    atorvastatin (LIPITOR) 10 MG Tab Take 1 Tablet by mouth every evening. 90 Tablet 3    Ferrous Sulfate (IRON PO) Take 1 Tablet by mouth every 48 hours.      omeprazole (PRILOSEC) 20 MG delayed-release capsule Take 1 Cap by mouth every day. 30 Cap 0     No current facility-administered medications on file prior to visit.        Allergies:    Coconut oil     Problem list, medications, and allergies reviewed by myself today in Epic     Physical Exam:    Vitals:    03/27/25 0956   BP: 118/60   Pulse: 74   Resp: 18   Temp: 36.7 °C (98.1 °F)   SpO2: 97%             Physical  Exam  Constitutional:       General: He is awake.      Appearance: Normal appearance. He is not ill-appearing, toxic-appearing or diaphoretic.   HENT:      Head: Normocephalic and atraumatic.      Right Ear: Ear canal and external ear normal. A middle ear effusion is present. Tympanic membrane is bulging. Tympanic membrane is not erythematous.      Left Ear: Ear canal and external ear normal. A middle ear effusion is present. Tympanic membrane is bulging. Tympanic membrane is not erythematous.      Nose: Mucosal edema, congestion and rhinorrhea present. Rhinorrhea is clear.      Right Turbinates: Swollen.      Left Turbinates: Swollen.      Right Sinus: Maxillary sinus tenderness and frontal sinus tenderness present.      Left Sinus: Maxillary sinus tenderness and frontal sinus tenderness present.      Mouth/Throat:      Lips: Pink.      Mouth: Mucous membranes are moist.      Tongue: No lesions.      Palate: No lesions.      Pharynx: Posterior oropharyngeal erythema present. No oropharyngeal exudate or uvula swelling.      Tonsils: No tonsillar exudate or tonsillar abscesses.   Eyes:      General: Lids are normal. Gaze aligned appropriately. No allergic shiner or scleral icterus.     Extraocular Movements: Extraocular movements intact.      Conjunctiva/sclera: Conjunctivae normal.      Pupils: Pupils are equal, round, and reactive to light.   Cardiovascular:      Rate and Rhythm: Normal rate and regular rhythm.      Pulses:           Radial pulses are 2+ on the right side and 2+ on the left side.      Heart sounds: Normal heart sounds.   Pulmonary:      Effort: Pulmonary effort is normal.      Breath sounds: Normal breath sounds and air entry. No decreased breath sounds, wheezing, rhonchi or rales.   Abdominal:      General: Abdomen is flat. Bowel sounds are normal.      Palpations: Abdomen is soft.      Tenderness: There is no abdominal tenderness.   Musculoskeletal:      Right lower leg: No edema.      Left lower  leg: No edema.   Lymphadenopathy:      Cervical: No cervical adenopathy.   Skin:     General: Skin is warm.      Capillary Refill: Capillary refill takes less than 2 seconds.      Coloration: Skin is not cyanotic or pale.   Neurological:      Mental Status: He is alert and oriented to person, place, and time.      Gait: Gait is intact.   Psychiatric:         Behavior: Behavior normal. Behavior is cooperative.            Medical Decision making and plan :  I personally reviewed prior external notes and test results pertinent to today's visit. Pt is clinically stable at today's acute urgent care visit.  Patient appears nontoxic with no acute distress noted. Appropriate for outpatient care at this time.    Pleasant 49 y.o. male presented clinic with:     Assessment & Plan  1. Sinusitis.  He has been experiencing significant sinus congestion, pain, and pressure for 2 months, worsening over the past week. Previous treatments included Augmentin, cefdinir, steroids, albuterol, and Flonase.  He will be started on doxycycline as an alternative antibiotic. If doxycycline is ineffective, levofloxacin may be considered. A Medrol Dosepak will be prescribed to manage inflammation. Zyrtec will be added to his daily regimen. A nasal spray will be prescribed for use twice daily in conjunction with Flonase. He is advised to avoid dairy products while taking doxycycline and to remain upright for 30 minutes post-administration to prevent esophageal irritation.    2. Gastroesophageal reflux disease.  He is currently taking omeprazole for acid reflux. He is advised to continue this medication.      Shared decision-making was utilized with patient for treatment plan. Medication discussed included indication for use and the potential benefits and side effects. Education was provided regarding the aforementioned assessments.  Differential Diagnosis, natural history, and supportive care discussed. All of the patient's questions were answered  to their satisfaction at the time of discharge. Patient was encouraged to monitor symptoms closely. Those signs and symptoms which would warrant concern and mandate seeking a higher level of service through the emergency department discussed at length.  Patient stated agreement and understanding of this plan of care.    Disposition:  Home in stable condition     Voice Recognition Disclaimer:  Portions of this document were created using voice recognition software and [employer] technology provided by [facility].  Patient was informed of [email or web address] technology being used.  The software does have a chance of producing errors of grammar and possibly content. I have made every reasonable attempt to correct obvious errors, but there could be errors of grammar and possibly content that I did not discover before finalizing the documentation.    [email or web address].

## 2025-05-08 DIAGNOSIS — E78.1 HIGH TRIGLYCERIDES: ICD-10-CM

## 2025-05-08 DIAGNOSIS — E11.9 TYPE 2 DIABETES MELLITUS WITHOUT COMPLICATION, WITHOUT LONG-TERM CURRENT USE OF INSULIN (HCC): ICD-10-CM

## 2025-05-08 DIAGNOSIS — F41.9 ANXIETY AND DEPRESSION: ICD-10-CM

## 2025-05-08 DIAGNOSIS — F32.A ANXIETY AND DEPRESSION: ICD-10-CM

## 2025-05-08 RX ORDER — NADOLOL 20 MG/1
20 TABLET ORAL DAILY
Qty: 90 TABLET | Refills: 3 | Status: SHIPPED | OUTPATIENT
Start: 2025-05-08

## 2025-05-08 RX ORDER — ATORVASTATIN CALCIUM 10 MG/1
10 TABLET, FILM COATED ORAL EVERY EVENING
Qty: 90 TABLET | Refills: 3 | Status: SHIPPED | OUTPATIENT
Start: 2025-05-08

## 2025-05-08 NOTE — TELEPHONE ENCOUNTER
Received request via: Patient    Was the patient seen in the last year in this department? Yes    Does the patient have an active prescription (recently filled or refills available) for medication(s) requested? No    [facility]    Does the patient have care home Plus and need 100-day supply? (This applies to ALL medications) Patient does not have SCP

## 2025-07-01 ENCOUNTER — APPOINTMENT (OUTPATIENT)
Dept: MEDICAL GROUP | Facility: MEDICAL CENTER | Age: 50
End: 2025-07-01
Payer: COMMERCIAL

## 2025-07-01 VITALS
BODY MASS INDEX: 29.82 KG/M2 | HEIGHT: 71 IN | HEART RATE: 65 BPM | TEMPERATURE: 97.2 F | WEIGHT: 213 LBS | SYSTOLIC BLOOD PRESSURE: 96 MMHG | DIASTOLIC BLOOD PRESSURE: 56 MMHG | OXYGEN SATURATION: 98 %

## 2025-07-01 DIAGNOSIS — D50.8 OTHER IRON DEFICIENCY ANEMIA: ICD-10-CM

## 2025-07-01 DIAGNOSIS — M70.52 INFRAPATELLAR BURSITIS OF BOTH KNEES: ICD-10-CM

## 2025-07-01 DIAGNOSIS — E78.1 HIGH TRIGLYCERIDES: ICD-10-CM

## 2025-07-01 DIAGNOSIS — M70.51 INFRAPATELLAR BURSITIS OF BOTH KNEES: ICD-10-CM

## 2025-07-01 DIAGNOSIS — E11.69 HYPERLIPIDEMIA ASSOCIATED WITH TYPE 2 DIABETES MELLITUS (HCC): ICD-10-CM

## 2025-07-01 DIAGNOSIS — E78.5 HYPERLIPIDEMIA ASSOCIATED WITH TYPE 2 DIABETES MELLITUS (HCC): ICD-10-CM

## 2025-07-01 DIAGNOSIS — E11.9 TYPE 2 DIABETES MELLITUS WITHOUT COMPLICATION, WITHOUT LONG-TERM CURRENT USE OF INSULIN (HCC): ICD-10-CM

## 2025-07-01 PROCEDURE — 99214 OFFICE O/P EST MOD 30 MIN: CPT | Performed by: STUDENT IN AN ORGANIZED HEALTH CARE EDUCATION/TRAINING PROGRAM

## 2025-07-01 PROCEDURE — 3078F DIAST BP <80 MM HG: CPT | Performed by: STUDENT IN AN ORGANIZED HEALTH CARE EDUCATION/TRAINING PROGRAM

## 2025-07-01 PROCEDURE — 3074F SYST BP LT 130 MM HG: CPT | Performed by: STUDENT IN AN ORGANIZED HEALTH CARE EDUCATION/TRAINING PROGRAM

## 2025-07-01 ASSESSMENT — FIBROSIS 4 INDEX: FIB4 SCORE: 0.82

## 2025-07-01 NOTE — PROGRESS NOTES
Subjective:     CC: Knee pain, chronic conditions    HPI:   Fredis presents today with bilateral pain and swelling inferior to the patella.  This is a chronic condition and happens more when he is on his knees or bending over.    Problem   Iron Deficiency Anemia    Chronic condition. He has seen GI and had EGD/colonoscopy 11/2022 which were overall normal.  May be secondary to chronic hemorrhoids. He takes every other day iron supplementation.      High Triglycerides    Chronic condition.  Current regimen: fenofibrate 145mg daily    Lab Results   Component Value Date/Time    CHOLSTRLTOT 136 11/30/2024 07:22 AM    LDL 84 11/30/2024 07:22 AM    HDL 30 (A) 11/30/2024 07:22 AM    TRIGLYCERIDE 110 11/30/2024 07:22 AM       Lab Results   Component Value Date/Time    SODIUM 139 11/30/2024 07:22 AM    POTASSIUM 3.9 11/30/2024 07:22 AM    CHLORIDE 103 11/30/2024 07:22 AM    CO2 25 11/30/2024 07:22 AM    GLUCOSE 120 (H) 11/30/2024 07:22 AM    BUN 11 11/30/2024 07:22 AM    CREATININE 1.04 11/30/2024 07:22 AM     Lab Results   Component Value Date/Time    ALKPHOSPHAT 73 11/30/2024 07:22 AM    ASTSGOT 32 11/30/2024 07:22 AM    ALTSGPT 40 11/30/2024 07:22 AM    TBILIRUBIN 0.4 11/30/2024 07:22 AM             Type 2 Diabetes Mellitus Without Complication, Without Long-Term Current Use of Insulin (Hcc)    Chronic condition, currently well-controlled with metformin 1000 mg twice daily.  Up-to-date on diabetic screenings.       Hyperlipidemia Associated With Type 2 Diabetes Mellitus (Hcc)    Chronic condition. Current regimen: atorvastatin 10mg qhs, fenofibrate 145mg daily      Lab Results   Component Value Date/Time    CHOLSTRLTOT 136 11/30/2024 07:22 AM    LDL 84 11/30/2024 07:22 AM    HDL 30 (A) 11/30/2024 07:22 AM    TRIGLYCERIDE 110 11/30/2024 07:22 AM       Lab Results   Component Value Date/Time    SODIUM 139 11/30/2024 07:22 AM    POTASSIUM 3.9 11/30/2024 07:22 AM    CHLORIDE 103 11/30/2024 07:22 AM    CO2 25 11/30/2024 07:22 AM  "   GLUCOSE 120 (H) 11/30/2024 07:22 AM    BUN 11 11/30/2024 07:22 AM    CREATININE 1.04 11/30/2024 07:22 AM     Lab Results   Component Value Date/Time    ALKPHOSPHAT 73 11/30/2024 07:22 AM    ASTSGOT 32 11/30/2024 07:22 AM    ALTSGPT 40 11/30/2024 07:22 AM    TBILIRUBIN 0.4 11/30/2024 07:22 AM                 ROS:  ROS    Objective:     Exam:  BP 96/56 (BP Location: Left arm, Patient Position: Sitting, BP Cuff Size: Adult)   Pulse 65   Temp 36.2 °C (97.2 °F) (Temporal)   Ht 1.803 m (5' 11\")   Wt 96.6 kg (213 lb)   SpO2 98%   BMI 29.71 kg/m²  Body mass index is 29.71 kg/m².    Physical Exam  Vitals reviewed.   Constitutional:       General: He is not in acute distress.     Appearance: He is not toxic-appearing.   HENT:      Head: Normocephalic and atraumatic.      Right Ear: External ear normal.      Left Ear: External ear normal.   Eyes:      General:         Right eye: No discharge.         Left eye: No discharge.      Extraocular Movements: Extraocular movements intact.      Conjunctiva/sclera: Conjunctivae normal.   Cardiovascular:      Rate and Rhythm: Normal rate and regular rhythm.      Heart sounds: Normal heart sounds.   Pulmonary:      Effort: Pulmonary effort is normal. No respiratory distress.      Breath sounds: Normal breath sounds. No rales.   Musculoskeletal:      Comments: Swelling over both tibial tuberosities   Skin:     General: Skin is warm and dry.   Neurological:      Mental Status: He is alert.   Psychiatric:         Mood and Affect: Mood normal.         Behavior: Behavior normal.         Thought Content: Thought content normal.         Judgment: Judgment normal.           Assessment & Plan:     49 y.o. male with the following -     1. Infrapatellar bursitis of both knees  We discussed referral to orthopedics due to his bursitis  - Referral to Orthopedics    2. Type 2 diabetes mellitus without complication, without long-term current use of insulin (HCC)  Well-controlled, continue " metformin at current dosing, recheck labs  - HEMOGLOBIN A1C; Future  - Lipid Profile; Future  - Comp Metabolic Panel; Future    3. High triglycerides  Continue fenofibrate and atorvastatin, recheck labs  - Lipid Profile; Future    4. Hyperlipidemia associated with type 2 diabetes mellitus (HCC)  Continue fenofibrate and atorvastatin, recheck labs  - Lipid Profile; Future    5. Other iron deficiency anemia  Chronic, previously at goal, continue iron supplementation every other day and recheck labs  - CBC WITH DIFFERENTIAL; Future  - IRON/TOTAL IRON BIND; Future  - FERRITIN; Future      No follow-ups on file.    Please note that this dictation was created using voice recognition software. I have made every reasonable attempt to correct obvious errors, but I expect that there are errors of grammar and possibly content that I did not discover before finalizing the note.

## 2025-07-08 NOTE — Clinical Note
REFERRAL APPROVAL NOTICE         Sent on July 8, 2025                   Fredis Perry  [address] [place] [address]                   Dear Mr. Perry,    After a careful review of the medical information and benefit coverage, [facility] has processed your referral. See below for additional details.    If applicable, you must be actively enrolled with your insurance for coverage of the authorized service. If you have any questions regarding your coverage, please contact your insurance directly.    REFERRAL INFORMATION   Referral #:  [id number]  Referred-To Department    Referred-By Provider:  Orthopedics    Analy Lau M.D.   Department Of Surgery      [address]  [address]  [place] [address]  [phone] [address], [address]  [place] [id number] [phone]    Referral Start Date:  07/01/2025  Referral End Date:   07/01/2026             SCHEDULING  If you do not already have an appointment, please call [phone] to make an appointment.     MORE INFORMATION  If you do not already have a [employer] account, sign up at: [email or web address]  You can access your medical information, make appointments, see lab results, billing information, and more.  If you have questions regarding this referral, please contact  the [facility] Referrals department at:             [phone]. Monday - Friday 8:00AM - 5:00PM.     Sincerely,    [facility]

## 2025-07-18 ENCOUNTER — HOSPITAL ENCOUNTER (OUTPATIENT)
Dept: LAB | Facility: MEDICAL CENTER | Age: 50
End: 2025-07-18
Attending: STUDENT IN AN ORGANIZED HEALTH CARE EDUCATION/TRAINING PROGRAM
Payer: COMMERCIAL

## 2025-07-18 DIAGNOSIS — E78.1 HIGH TRIGLYCERIDES: ICD-10-CM

## 2025-07-18 DIAGNOSIS — E11.9 TYPE 2 DIABETES MELLITUS WITHOUT COMPLICATION, WITHOUT LONG-TERM CURRENT USE OF INSULIN (HCC): ICD-10-CM

## 2025-07-18 DIAGNOSIS — D50.8 OTHER IRON DEFICIENCY ANEMIA: ICD-10-CM

## 2025-07-18 DIAGNOSIS — E78.5 HYPERLIPIDEMIA ASSOCIATED WITH TYPE 2 DIABETES MELLITUS (HCC): ICD-10-CM

## 2025-07-18 DIAGNOSIS — E11.69 HYPERLIPIDEMIA ASSOCIATED WITH TYPE 2 DIABETES MELLITUS (HCC): ICD-10-CM

## 2025-07-18 LAB
ALBUMIN SERPL BCP-MCNC: 4.6 G/DL (ref 3.2–4.9)
ALBUMIN/GLOB SERPL: 1.9 G/DL
ALP SERPL-CCNC: 61 U/L (ref 30–99)
ALT SERPL-CCNC: 40 U/L (ref 2–50)
ANION GAP SERPL CALC-SCNC: 12 MMOL/L (ref 7–16)
AST SERPL-CCNC: 37 U/L (ref 12–45)
BASOPHILS # BLD AUTO: 1 % (ref 0–1.8)
BASOPHILS # BLD: 0.05 K/UL (ref 0–0.12)
BILIRUB SERPL-MCNC: 0.4 MG/DL (ref 0.1–1.5)
BUN SERPL-MCNC: 12 MG/DL (ref 8–22)
CALCIUM ALBUM COR SERPL-MCNC: 9 MG/DL (ref 8.5–10.5)
CALCIUM SERPL-MCNC: 9.5 MG/DL (ref 8.5–10.5)
CHLORIDE SERPL-SCNC: 105 MMOL/L (ref 96–112)
CHOLEST SERPL-MCNC: 135 MG/DL (ref 100–199)
CO2 SERPL-SCNC: 22 MMOL/L (ref 20–33)
CREAT SERPL-MCNC: 1.15 MG/DL (ref 0.5–1.4)
EOSINOPHIL # BLD AUTO: 0.14 K/UL (ref 0–0.51)
EOSINOPHIL NFR BLD: 2.9 % (ref 0–6.9)
ERYTHROCYTE [DISTWIDTH] IN BLOOD BY AUTOMATED COUNT: 44.4 FL (ref 35.9–50)
EST. AVERAGE GLUCOSE BLD GHB EST-MCNC: 137 MG/DL
FASTING STATUS PATIENT QL REPORTED: NORMAL
FERRITIN SERPL-MCNC: 23.4 NG/ML (ref 22–322)
GFR SERPLBLD CREATININE-BSD FMLA CKD-EPI: 79 ML/MIN/1.73 M 2
GLOBULIN SER CALC-MCNC: 2.4 G/DL (ref 1.9–3.5)
GLUCOSE SERPL-MCNC: 121 MG/DL (ref 65–99)
HBA1C MFR BLD: 6.4 % (ref 4–5.6)
HCT VFR BLD AUTO: 45.6 % (ref 42–52)
HDLC SERPL-MCNC: 29 MG/DL
HGB BLD-MCNC: 14.3 G/DL (ref 14–18)
IMM GRANULOCYTES # BLD AUTO: 0.02 K/UL (ref 0–0.11)
IMM GRANULOCYTES NFR BLD AUTO: 0.4 % (ref 0–0.9)
IRON SATN MFR SERPL: 13 % (ref 15–55)
IRON SERPL-MCNC: 60 UG/DL (ref 50–180)
LDLC SERPL CALC-MCNC: 84 MG/DL
LYMPHOCYTES # BLD AUTO: 2.07 K/UL (ref 1–4.8)
LYMPHOCYTES NFR BLD: 42.4 % (ref 22–41)
MCH RBC QN AUTO: 25.4 PG (ref 27–33)
MCHC RBC AUTO-ENTMCNC: 31.4 G/DL (ref 32.3–36.5)
MCV RBC AUTO: 81 FL (ref 81.4–97.8)
MONOCYTES # BLD AUTO: 0.48 K/UL (ref 0–0.85)
MONOCYTES NFR BLD AUTO: 9.8 % (ref 0–13.4)
NEUTROPHILS # BLD AUTO: 2.12 K/UL (ref 1.82–7.42)
NEUTROPHILS NFR BLD: 43.5 % (ref 44–72)
NRBC # BLD AUTO: 0 K/UL
NRBC BLD-RTO: 0 /100 WBC (ref 0–0.2)
PLATELET # BLD AUTO: 297 K/UL (ref 164–446)
PMV BLD AUTO: 10.9 FL (ref 9–12.9)
POTASSIUM SERPL-SCNC: 4.2 MMOL/L (ref 3.6–5.5)
PROT SERPL-MCNC: 7 G/DL (ref 6–8.2)
RBC # BLD AUTO: 5.63 M/UL (ref 4.7–6.1)
SODIUM SERPL-SCNC: 139 MMOL/L (ref 135–145)
TIBC SERPL-MCNC: 473 UG/DL (ref 250–450)
TRIGL SERPL-MCNC: 108 MG/DL (ref 0–149)
UIBC SERPL-MCNC: 413 UG/DL (ref 110–370)
WBC # BLD AUTO: 4.9 K/UL (ref 4.8–10.8)

## 2025-07-18 PROCEDURE — 82728 ASSAY OF FERRITIN: CPT

## 2025-07-18 PROCEDURE — 80061 LIPID PANEL: CPT

## 2025-07-18 PROCEDURE — 83550 IRON BINDING TEST: CPT

## 2025-07-18 PROCEDURE — 85025 COMPLETE CBC W/AUTO DIFF WBC: CPT

## 2025-07-18 PROCEDURE — 83036 HEMOGLOBIN GLYCOSYLATED A1C: CPT

## 2025-07-18 PROCEDURE — 36415 COLL VENOUS BLD VENIPUNCTURE: CPT

## 2025-07-18 PROCEDURE — 83540 ASSAY OF IRON: CPT

## 2025-07-18 PROCEDURE — 80053 COMPREHEN METABOLIC PANEL: CPT

## 2025-07-30 DIAGNOSIS — M25.562 PAIN IN BOTH KNEES, UNSPECIFIED CHRONICITY: Primary | ICD-10-CM

## 2025-07-30 DIAGNOSIS — M25.561 PAIN IN BOTH KNEES, UNSPECIFIED CHRONICITY: Primary | ICD-10-CM

## 2025-08-02 ENCOUNTER — HOSPITAL ENCOUNTER (OUTPATIENT)
Dept: RADIOLOGY | Facility: MEDICAL CENTER | Age: 50
End: 2025-08-02
Attending: ORTHOPAEDIC SURGERY
Payer: COMMERCIAL

## 2025-08-02 PROCEDURE — 73562 X-RAY EXAM OF KNEE 3: CPT | Mod: LT

## 2025-08-02 PROCEDURE — 73562 X-RAY EXAM OF KNEE 3: CPT | Mod: RT

## 2025-08-02 PROCEDURE — 73565 X-RAY EXAM OF KNEES: CPT

## 2025-08-05 ENCOUNTER — OFFICE VISIT (OUTPATIENT)
Facility: MEDICAL CENTER | Age: 50
End: 2025-08-05
Payer: COMMERCIAL

## 2025-08-05 VITALS
TEMPERATURE: 97.8 F | SYSTOLIC BLOOD PRESSURE: 102 MMHG | OXYGEN SATURATION: 98 % | HEART RATE: 65 BPM | DIASTOLIC BLOOD PRESSURE: 80 MMHG | WEIGHT: 209.44 LBS | HEIGHT: 71 IN | BODY MASS INDEX: 29.32 KG/M2

## 2025-08-05 DIAGNOSIS — M76.51 PATELLAR TENDINITIS OF BOTH KNEES: Primary | ICD-10-CM

## 2025-08-05 DIAGNOSIS — M76.52 PATELLAR TENDINITIS OF BOTH KNEES: Primary | ICD-10-CM

## 2025-08-05 PROCEDURE — 3079F DIAST BP 80-89 MM HG: CPT | Performed by: ORTHOPAEDIC SURGERY

## 2025-08-05 PROCEDURE — 3074F SYST BP LT 130 MM HG: CPT | Performed by: ORTHOPAEDIC SURGERY

## 2025-08-05 PROCEDURE — 99203 OFFICE O/P NEW LOW 30 MIN: CPT | Performed by: ORTHOPAEDIC SURGERY

## 2025-08-05 ASSESSMENT — ENCOUNTER SYMPTOMS
SHORTNESS OF BREATH: 0
WEAKNESS: 0
SENSORY CHANGE: 0
TINGLING: 0

## 2025-08-05 ASSESSMENT — FIBROSIS 4 INDEX: FIB4 SCORE: 0.93

## (undated) DEVICE — SLEEVE, VASO, THIGH, MED

## (undated) DEVICE — CATHETER CHOLANGIOGRAM TAUT - (10/BX)

## (undated) DEVICE — GLOVE BIOGEL INDICATOR SZ 7.5 SURGICAL PF LTX - (50PR/BX 4BX/CA)

## (undated) DEVICE — CANISTER SUCTION RIGID RED 1500CC (40EA/CA)

## (undated) DEVICE — ELECTRODE 850 FOAM ADHESIVE - HYDROGEL RADIOTRNSPRNT (50/PK)

## (undated) DEVICE — PACK MINOR BASIN - (2EA/CA)

## (undated) DEVICE — TUBE CONNECTING SUCTION - CLEAR PLASTIC STERILE 72 IN (50EA/CA)

## (undated) DEVICE — SUTURE GENERAL

## (undated) DEVICE — CHLORAPREP 26 ML APPLICATOR - ORANGE TINT(25/CA)

## (undated) DEVICE — SODIUM CHL IRRIGATION 0.9% 1000ML (12EA/CA)

## (undated) DEVICE — SUTURE 0 VICRYL PLUS UR-6 - 27 INCH (36/BX)

## (undated) DEVICE — GOWN WARMING STANDARD FLEX - (30/CA)

## (undated) DEVICE — DRAPE LAPAROTOMY T SHEET - (12EA/CA)

## (undated) DEVICE — CATHETER IV 20 GA X 1-1/4 ---SURG.& SDS ONLY--- (50EA/BX)

## (undated) DEVICE — PAD LAP STERILE 18 X 18 - (5/PK 40PK/CA)

## (undated) DEVICE — SUTURE 4-0 MONOCRYL PLUS PS-2 - 27 INCH (36/BX)

## (undated) DEVICE — SCISSORS 5MM CVD (6EA/BX)

## (undated) DEVICE — SPONGE GAUZESTER 4 X 4 4PLY - (128PK/CA)

## (undated) DEVICE — SET LEADWIRE 5 LEAD BEDSIDE DISPOSABLE ECG (1SET OF 5/EA)

## (undated) DEVICE — SUTURE 3-0 VICRYL PLUS - 12 X 18 INCH (12/BX)

## (undated) DEVICE — HEAD HOLDER JUNIOR/ADULT

## (undated) DEVICE — PROTECTOR ULNA NERVE - (36PR/CA)

## (undated) DEVICE — DRAPE IOBAN II INCISE 23X17 - (10EA/BX 4BX/CA)

## (undated) DEVICE — GLOVE BIOGEL SZ 7 SURGICAL PF LTX - (50PR/BX 4BX/CA)

## (undated) DEVICE — BLADE SURGICAL #15 - (50/BX 3BX/CA)

## (undated) DEVICE — GLOVE BIOGEL INDICATOR SZ 8 SURGICAL PF LTX - (50/BX 4BX/CA)

## (undated) DEVICE — CLOSURE SKIN STRIP 1/2 X 4 IN - (STERI STRIP) (50/BX 4BX/CA)

## (undated) DEVICE — KIT  I.V. START (100EA/CA)

## (undated) DEVICE — TROCAR LAPSCP 100MM 12MM NTHRD - (6/BX)

## (undated) DEVICE — LACTATED RINGERS INJ 1000 ML - (14EA/CA 60CA/PF)

## (undated) DEVICE — ELECTRODE DUAL RETURN W/ CORD - (50/PK)

## (undated) DEVICE — SENSOR SPO2 NEO LNCS ADHESIVE (20/BX) SEE USER NOTES

## (undated) DEVICE — SUCTION INSTRUMENT YANKAUER BULBOUS TIP W/O VENT (50EA/CA)

## (undated) DEVICE — TUBING CLEARLINK DUO-VENT - C-FLO (48EA/CA)

## (undated) DEVICE — GLOVE SIZE 7.0 SURGEON ACCELERATOR FREE GREEN (50PR/BX, 4BX/CA)

## (undated) DEVICE — BOVIE BLADE COATED &INSULATED - 25/PK

## (undated) DEVICE — GLOVE BIOGEL PI INDICATOR SZ 8.0 SURGICAL PF LF -(50/BX 4BX/CA)

## (undated) DEVICE — SET EXTENSION WITH 2 PORTS (48EA/CA) ***PART #2C8610 IS A SUBSTITUTE*****

## (undated) DEVICE — HEMOSTAT ABSORBABLE POWDER SURGICEL 3G (5EA/BX)

## (undated) DEVICE — CANNULA W/SEAL 5X100 Z-THRE - ADED KII (12/BX)

## (undated) DEVICE — APPLICATOR ENDOSCOPIC SURGICEL (5EA/BX)

## (undated) DEVICE — CANISTER SUCTION 3000ML MECHANICAL FILTER AUTO SHUTOFF MEDI-VAC NONSTERILE LF DISP  (40EA/CA)

## (undated) DEVICE — TROCAR 5X100 NON BLADED Z-TH - READ KII (6/BX)

## (undated) DEVICE — COVER LIGHT HANDLE ALC PLUS DISP (18EA/BX)

## (undated) DEVICE — DRESSING TRANSPARENT FILM TEGADERM 4 X 4.75" (50EA/BX)"

## (undated) DEVICE — PACK LAP CHOLE OR - (2EA/CA)

## (undated) DEVICE — MASK, LARYNGEAL AIRWAY #4

## (undated) DEVICE — MASK ANESTHESIA ADULT  - (100/CA)

## (undated) DEVICE — CANISTER SUCTION 3000ML MECHANICAL FILTER AUTO SHUTOFF MEDI-VAC NONSTERILE LF DISP (40EA/CA)

## (undated) DEVICE — GLOVE SZ 7.5 BIOGEL PI MICRO - PF LF (50PR/BX)

## (undated) DEVICE — WATER IRRIGATION STERILE 1000ML (12EA/CA)

## (undated) DEVICE — SYSTEM CLEARIFY VISUALIZATION (10EA/PK)

## (undated) DEVICE — KIT ANESTHESIA W/CIRCUIT & 3/LT BAG W/FILTER (20EA/CA)

## (undated) DEVICE — STERI STRIP COMPOUND BENZOIN - TINCTURE 0.6ML WITH APPLICATOR (40EA/BX)

## (undated) DEVICE — DERMABOND ADVANCED - (12EA/BX)

## (undated) DEVICE — SENSOR OXIMETER ADULT SPO2 RD SET (20EA/BX)

## (undated) DEVICE — SUTURE 3-0 VICRYL PLUS SH - 8X 18 INCH (12/BX)